# Patient Record
Sex: MALE | Race: WHITE | NOT HISPANIC OR LATINO | Employment: OTHER | URBAN - METROPOLITAN AREA
[De-identification: names, ages, dates, MRNs, and addresses within clinical notes are randomized per-mention and may not be internally consistent; named-entity substitution may affect disease eponyms.]

---

## 2017-01-06 ENCOUNTER — HOSPITAL ENCOUNTER (INPATIENT)
Facility: HOSPITAL | Age: 57
LOS: 6 days | Discharge: NURSING, REHAB OR LONG-TERM CARE | End: 2017-01-13
Attending: EMERGENCY MEDICINE | Admitting: FAMILY MEDICINE

## 2017-01-06 DIAGNOSIS — Z74.09 PROLONGED IMMOBILIZATION: ICD-10-CM

## 2017-01-06 DIAGNOSIS — A41.9 SEPSIS, DUE TO UNSPECIFIED ORGANISM: ICD-10-CM

## 2017-01-06 DIAGNOSIS — J10.1 TYPE A INFLUENZA: ICD-10-CM

## 2017-01-06 DIAGNOSIS — N39.0 UTI (URINARY TRACT INFECTION), BACTERIAL: Primary | ICD-10-CM

## 2017-01-06 DIAGNOSIS — A49.9 UTI (URINARY TRACT INFECTION), BACTERIAL: Primary | ICD-10-CM

## 2017-01-06 PROCEDURE — 99285 EMERGENCY DEPT VISIT HI MDM: CPT

## 2017-01-06 RX ORDER — WARFARIN SODIUM 4 MG/1
4 TABLET ORAL
COMMUNITY
End: 2017-01-13 | Stop reason: HOSPADM

## 2017-01-06 RX ORDER — SODIUM CHLORIDE 0.9 % (FLUSH) 0.9 %
10 SYRINGE (ML) INJECTION AS NEEDED
Status: DISCONTINUED | OUTPATIENT
Start: 2017-01-06 | End: 2017-01-13 | Stop reason: HOSPADM

## 2017-01-06 RX ORDER — PHENYTOIN 125 MG/5ML
300 SUSPENSION ORAL NIGHTLY
Status: ON HOLD | COMMUNITY
End: 2021-09-21

## 2017-01-06 NOTE — IP AVS SNAPSHOT
INTER-FACILITY TRANSFER   AFTER VISIT SUMMARY             Warnera S Kang            Summary of Your Hospitalization        About Your Hospitalization     You were admitted on:  January 7, 2017 You last received care in the:  [address]      Reason for Hospitalization     Your primary diagnosis was:  Not on File    Your diagnoses also included:  Uti (Urinary Tract Infection), Bacterial      Care Providers     Provider Service Role Specialty    Dylan Cuba MD Family Medicine Attending Provider Family Medicine    Dylan Cuba MD Family Medicine Consulting Physician  Family Medicine    Miguel Leonard MD Urology Consulting Physician  Urology    Jairo Crum MD -- Consulting Physician  Cardiology      Your Allergies  Date Reviewed: 1/7/2017    No active allergies       Medications    Based on the information you provided to us as well as any changes during this visit, the following is your updated medication list.  This is subject to change based on the care your receive at the receiving facility.  You should receive a new list once you are discharged.      If you have any questions or concerns, contact your primary care physician's office.             Your Medications      START taking these medications     amLODIPine 5 MG tablet   Take 1 tablet by mouth Daily.   Last time this was given:  1/13/2017  9:01 AM   Commonly known as:  NORVASC           cefepime   Infuse 100 mL into a venous catheter Every 12 (Twelve) Hours for 9 days.   Last time this was given:  1/13/2017 12:18 AM   Commonly known as:  MAXIPIME           metoprolol tartrate 25 MG tablet   Take 1 tablet by mouth Every 12 (Twelve) Hours.   Last time this was given:  1/13/2017  9:01 AM   Commonly known as:  LOPRESSOR           nitroglycerin 0.4 MG SL tablet   Place 1 tablet under the tongue Every 5 (Five) Minutes As Needed for chest pain (if systolic BP greater than 100 mm/Hg.).   Commonly known as:   NITROSTAT           saccharomyces boulardii 250 MG capsule   Take 1 capsule by mouth 2 (Two) Times a Day.   Last time this was given:  1/13/2017  9:00 AM   Commonly known as:  FLORASTOR             CHANGE how you take these medications     levETIRAcetam 500 MG tablet   Take 1 tablet by mouth Every 12 (Twelve) Hours.   Last time this was given:  1/13/2017  9:01 AM   Commonly known as:  KEPPRA   What changed:    - how to take this  - when to take this  - additional instructions           warfarin 3 MG tablet   4 mg by Enteral route daily. Give 1 tablet by mouth at bedtime related to unspecified atrial fibrillation PER FEEDING TUBE HOLDING FOR SURGERY   Last time this was given:  1/12/2017  6:07 PM   Commonly known as:  COUMADIN   What changed:  Another medication with the same name was removed. Continue taking this medication, and follow the directions you see here.             CONTINUE taking these medications     acetaminophen 325 MG tablet   650 mg by Enteral route every 6 (six) hours as needed (for pain/elevated temperature). GIVEN PER FEEDING TUBE   Last time this was given:  1/7/2017 12:31 AM   Commonly known as:  TYLENOL           bisacodyl 10 MG suppository   Insert 10 mg into the rectum daily as needed for constipation.   Commonly known as:  DULCOLAX           dextrose 40 % gel   Take 15 g by mouth as needed for low blood sugar (may give for BS<60 & symptomatic).   Commonly known as:  GLUTOSE           escitalopram 10 MG tablet   10 mg by Enteral route daily. GIVEN PER FEEDING TUBE   Last time this was given:  1/13/2017  9:01 AM   Commonly known as:  LEXAPRO           ferrous sulfate 300 (60 FE) MG/5ML syrup   325 mg by Enteral route daily. PER FEEDING TUBE   Last time this was given:  1/13/2017  9:02 AM           glucagon 1 MG injection   Inject 1 mg into the shoulder, thigh, or buttocks 1 (one) time as needed for low blood sugar (give for BS<60 & symptomatic, may repeat x2 with 10mins apart).   Commonly  known as:  GLUCAGEN           magnesium hydroxide 400 MG/5ML suspension   30 mL by Enteral route daily as needed for constipation. PER FEEDING TUBE   Commonly known as:  MILK OF MAGNESIA           metFORMIN 1000 MG tablet   1,000 mg by Enteral route 2 (two) times a day with meals. given at 0730 & 1630 PER FEEDING TUBE   Last time this was given:  1/13/2017  9:01 AM   Commonly known as:  GLUCOPHAGE           phenytoin 125 MG/5ML suspension   Take 300 mg by mouth Every Night.   Last time this was given:  1/13/2017  9:01 AM   Commonly known as:  DILANTIN           sennosides-docusate sodium 8.6-50 MG tablet   2 tablets by Enteral route every night. GIVEN PER FEEDING TUBE   Last time this was given:  1/10/2017  8:31 PM   Commonly known as:  SENOKOT-S           simvastatin 10 MG tablet   10 mg by Enteral route every night. GIVEN PER FEEDING TUBE   Commonly known as:  ZOCOR             STOP taking these medications     phenytoin 100 MG ER capsule   Commonly known as:  DILANTIN                Where to Get Your Medications      Information about where to get these medications is not yet available     ! Ask your nurse or doctor about these medications     amLODIPine 5 MG tablet    cefepime    levETIRAcetam 500 MG tablet    metoprolol tartrate 25 MG tablet    nitroglycerin 0.4 MG SL tablet    saccharomyces boulardii 250 MG capsule                Additional Instructions    Information is subject to change based on the care your receive at the receiving facility.  If you have any questions or concerns, contact your primary care physician's office.          Diet Instructions     Diet: Soft Texture, Consistent Carbohydrate; Thin Liquids, No Restrictions; Ground       Discharge Diet:   Soft Texture  Consistent Carbohydrate      Fluid Consistency:  Thin Liquids, No Restrictions   Soft Options:  Ground             Other Instructions     Resume Oxygen Therapy After Discharge       Device:  Nasal Cannula   Initial LPM:  2               Follow-ups for After Discharge        Follow-up Information     Follow up with [facility] .    Specialty:  Skilled Nursing Facility    Contact information:    [address] [place] [address] [phone]      Referrals and Follow-ups to Schedule     Ambulatory Referral to Physical Therapy Evaluate and treat    As directed    Specialty modality needed?:  Evaluate and treat             Scheduled Appointments     PCP follow up appt to be done by CCP - Patient discharging to rehab.            [employer] Signup Information     [employer] allows you to send messages to your doctor, view your test results, renew your prescriptions, schedule appointments, and more. To sign up, go to [employer] and click on the Sign Up Now link in the New User? box. Enter your [employer] Activation Code exactly as it appears below along with the last four digits of your Social Security Number and your Date of Birth () to complete the sign-up process. If you do not sign up before the expiration date, you must request a new code.    [employer] Activation Code: [email or web address]  Expires: 2017 11:09 AM    If you have questions, you can email [email or web address] or call [phone] to talk to our [employer] staff. Remember, [employer] is NOT to be used for urgent needs. For medical emergencies, dial 911.                     Patient Signature:  ____________________________________________________________    Date:  ____________________________________________________________

## 2017-01-07 ENCOUNTER — APPOINTMENT (OUTPATIENT)
Dept: GENERAL RADIOLOGY | Facility: HOSPITAL | Age: 57
End: 2017-01-07

## 2017-01-07 ENCOUNTER — APPOINTMENT (OUTPATIENT)
Dept: CT IMAGING | Facility: HOSPITAL | Age: 57
End: 2017-01-07

## 2017-01-07 PROBLEM — A49.9 UTI (URINARY TRACT INFECTION), BACTERIAL: Status: ACTIVE | Noted: 2017-01-07

## 2017-01-07 PROBLEM — N39.0 UTI (URINARY TRACT INFECTION), BACTERIAL: Status: ACTIVE | Noted: 2017-01-07

## 2017-01-07 LAB
ALBUMIN SERPL-MCNC: 4 G/DL (ref 3.5–5.2)
ALBUMIN/GLOB SERPL: 1.1 G/DL
ALP SERPL-CCNC: 98 U/L (ref 39–117)
ALT SERPL W P-5'-P-CCNC: 27 U/L (ref 1–41)
ANION GAP SERPL CALCULATED.3IONS-SCNC: 12.9 MMOL/L
ANION GAP SERPL CALCULATED.3IONS-SCNC: 19.8 MMOL/L
AST SERPL-CCNC: 16 U/L (ref 1–40)
B PERT DNA SPEC QL NAA+PROBE: NOT DETECTED
BACTERIA UR QL AUTO: ABNORMAL /HPF
BASOPHILS # BLD AUTO: 0.01 10*3/MM3 (ref 0–0.2)
BASOPHILS # BLD AUTO: 0.02 10*3/MM3 (ref 0–0.2)
BASOPHILS NFR BLD AUTO: 0.1 % (ref 0–1.5)
BASOPHILS NFR BLD AUTO: 0.1 % (ref 0–1.5)
BILIRUB SERPL-MCNC: 0.4 MG/DL (ref 0.1–1.2)
BILIRUB UR QL STRIP: NEGATIVE
BUN BLD-MCNC: 10 MG/DL (ref 6–20)
BUN BLD-MCNC: 11 MG/DL (ref 6–20)
BUN/CREAT SERPL: 13.3 (ref 7–25)
BUN/CREAT SERPL: 14.1 (ref 7–25)
C PNEUM DNA NPH QL NAA+NON-PROBE: NOT DETECTED
CALCIUM SPEC-SCNC: 8.2 MG/DL (ref 8.6–10.5)
CALCIUM SPEC-SCNC: 9.3 MG/DL (ref 8.6–10.5)
CHLORIDE SERPL-SCNC: 101 MMOL/L (ref 98–107)
CHLORIDE SERPL-SCNC: 105 MMOL/L (ref 98–107)
CHOLEST SERPL-MCNC: 133 MG/DL (ref 0–200)
CLARITY UR: ABNORMAL
CO2 SERPL-SCNC: 21.2 MMOL/L (ref 22–29)
CO2 SERPL-SCNC: 22.1 MMOL/L (ref 22–29)
COLOR UR: YELLOW
CREAT BLD-MCNC: 0.75 MG/DL (ref 0.76–1.27)
CREAT BLD-MCNC: 0.78 MG/DL (ref 0.76–1.27)
D-LACTATE SERPL-SCNC: 2.3 MMOL/L (ref 0.5–2)
D-LACTATE SERPL-SCNC: 2.6 MMOL/L (ref 0.5–2)
D-LACTATE SERPL-SCNC: 3.3 MMOL/L (ref 0.5–2)
DEPRECATED RDW RBC AUTO: 44.5 FL (ref 37–54)
DEPRECATED RDW RBC AUTO: 44.9 FL (ref 37–54)
EOSINOPHIL # BLD AUTO: 0.01 10*3/MM3 (ref 0–0.7)
EOSINOPHIL # BLD AUTO: 0.05 10*3/MM3 (ref 0–0.7)
EOSINOPHIL NFR BLD AUTO: 0 % (ref 0.3–6.2)
EOSINOPHIL NFR BLD AUTO: 0.4 % (ref 0.3–6.2)
ERYTHROCYTE [DISTWIDTH] IN BLOOD BY AUTOMATED COUNT: 14.6 % (ref 11.5–14.5)
ERYTHROCYTE [DISTWIDTH] IN BLOOD BY AUTOMATED COUNT: 14.7 % (ref 11.5–14.5)
FLUAV AG NPH QL: POSITIVE
FLUAV H1 2009 PAND RNA NPH QL NAA+PROBE: NOT DETECTED
FLUAV H1 HA GENE NPH QL NAA+PROBE: NOT DETECTED
FLUAV H3 RNA NPH QL NAA+PROBE: NOT DETECTED
FLUAV SUBTYP SPEC NAA+PROBE: NOT DETECTED
FLUBV AG NPH QL IA: NEGATIVE
FLUBV RNA ISLT QL NAA+PROBE: NOT DETECTED
GFR SERPL CREATININE-BSD FRML MDRD: 103 ML/MIN/1.73
GFR SERPL CREATININE-BSD FRML MDRD: 108 ML/MIN/1.73
GFR SERPL CREATININE-BSD FRML MDRD: 125 ML/MIN/1.73
GFR SERPL CREATININE-BSD FRML MDRD: 131 ML/MIN/1.73
GLOBULIN UR ELPH-MCNC: 3.6 GM/DL
GLUCOSE BLD-MCNC: 127 MG/DL (ref 65–99)
GLUCOSE BLD-MCNC: 157 MG/DL (ref 65–99)
GLUCOSE UR STRIP-MCNC: NEGATIVE MG/DL
HADV DNA SPEC NAA+PROBE: NOT DETECTED
HCOV 229E RNA SPEC QL NAA+PROBE: NOT DETECTED
HCOV HKU1 RNA SPEC QL NAA+PROBE: NOT DETECTED
HCOV NL63 RNA SPEC QL NAA+PROBE: NOT DETECTED
HCOV OC43 RNA SPEC QL NAA+PROBE: NOT DETECTED
HCT VFR BLD AUTO: 39.7 % (ref 40.4–52.2)
HCT VFR BLD AUTO: 47.4 % (ref 40.4–52.2)
HDLC SERPL-MCNC: 51 MG/DL (ref 40–60)
HGB BLD-MCNC: 12.7 G/DL (ref 13.7–17.6)
HGB BLD-MCNC: 14.9 G/DL (ref 13.7–17.6)
HGB UR QL STRIP.AUTO: ABNORMAL
HMPV RNA NPH QL NAA+NON-PROBE: NOT DETECTED
HOLD SPECIMEN: NORMAL
HOLD SPECIMEN: NORMAL
HPIV1 RNA SPEC QL NAA+PROBE: NOT DETECTED
HPIV2 RNA SPEC QL NAA+PROBE: NOT DETECTED
HPIV3 RNA NPH QL NAA+PROBE: NOT DETECTED
HPIV4 P GENE NPH QL NAA+PROBE: NOT DETECTED
HYALINE CASTS UR QL AUTO: ABNORMAL /LPF
IMM GRANULOCYTES # BLD: 0.03 10*3/MM3 (ref 0–0.03)
IMM GRANULOCYTES # BLD: 0.12 10*3/MM3 (ref 0–0.03)
IMM GRANULOCYTES NFR BLD: 0.2 % (ref 0–0.5)
IMM GRANULOCYTES NFR BLD: 0.5 % (ref 0–0.5)
INR PPP: 2.26 (ref 0.9–1.1)
INR PPP: 2.46 (ref 0.9–1.1)
KETONES UR QL STRIP: NEGATIVE
LDLC SERPL CALC-MCNC: 56 MG/DL (ref 0–100)
LDLC/HDLC SERPL: 1.1 {RATIO}
LEUKOCYTE ESTERASE UR QL STRIP.AUTO: ABNORMAL
LYMPHOCYTES # BLD AUTO: 0.91 10*3/MM3 (ref 0.9–4.8)
LYMPHOCYTES # BLD AUTO: 1.89 10*3/MM3 (ref 0.9–4.8)
LYMPHOCYTES NFR BLD AUTO: 7.2 % (ref 19.6–45.3)
LYMPHOCYTES NFR BLD AUTO: 8.3 % (ref 19.6–45.3)
M PNEUMO IGG SER IA-ACNC: NOT DETECTED
MAGNESIUM SERPL-MCNC: 1.2 MG/DL (ref 1.6–2.6)
MAGNESIUM SERPL-MCNC: 1.7 MG/DL (ref 1.6–2.6)
MCH RBC QN AUTO: 26.6 PG (ref 27–32.7)
MCH RBC QN AUTO: 26.6 PG (ref 27–32.7)
MCHC RBC AUTO-ENTMCNC: 31.4 G/DL (ref 32.6–36.4)
MCHC RBC AUTO-ENTMCNC: 32 G/DL (ref 32.6–36.4)
MCV RBC AUTO: 83.2 FL (ref 79.8–96.2)
MCV RBC AUTO: 84.6 FL (ref 79.8–96.2)
MONOCYTES # BLD AUTO: 0.3 10*3/MM3 (ref 0.2–1.2)
MONOCYTES # BLD AUTO: 1.59 10*3/MM3 (ref 0.2–1.2)
MONOCYTES NFR BLD AUTO: 2.4 % (ref 5–12)
MONOCYTES NFR BLD AUTO: 7 % (ref 5–12)
NEUTROPHILS # BLD AUTO: 11.35 10*3/MM3 (ref 1.9–8.1)
NEUTROPHILS # BLD AUTO: 19.1 10*3/MM3 (ref 1.9–8.1)
NEUTROPHILS NFR BLD AUTO: 84.1 % (ref 42.7–76)
NEUTROPHILS NFR BLD AUTO: 89.7 % (ref 42.7–76)
NITRITE UR QL STRIP: NEGATIVE
NT-PROBNP SERPL-MCNC: 1066 PG/ML (ref 0–900)
PH UR STRIP.AUTO: 5.5 [PH] (ref 5–8)
PHENYTOIN SERPL-MCNC: 7.2 MCG/ML (ref 10–20)
PHOSPHATE SERPL-MCNC: 2.2 MG/DL (ref 2.5–4.5)
PLATELET # BLD AUTO: 196 10*3/MM3 (ref 140–500)
PLATELET # BLD AUTO: 211 10*3/MM3 (ref 140–500)
PMV BLD AUTO: 10.2 FL (ref 6–12)
PMV BLD AUTO: 10.3 FL (ref 6–12)
POTASSIUM BLD-SCNC: 3.7 MMOL/L (ref 3.5–5.2)
POTASSIUM BLD-SCNC: 3.9 MMOL/L (ref 3.5–5.2)
PROCALCITONIN SERPL-MCNC: 0.47 NG/ML (ref 0.1–0.25)
PROCALCITONIN SERPL-MCNC: 10.09 NG/ML (ref 0.1–0.25)
PROT SERPL-MCNC: 7.6 G/DL (ref 6–8.5)
PROT UR QL STRIP: ABNORMAL
PROTHROMBIN TIME: 24.2 SECONDS (ref 11.7–14.2)
PROTHROMBIN TIME: 25.8 SECONDS (ref 11.7–14.2)
RBC # BLD AUTO: 4.77 10*6/MM3 (ref 4.6–6)
RBC # BLD AUTO: 5.6 10*6/MM3 (ref 4.6–6)
RBC # UR: ABNORMAL /HPF
REF LAB TEST METHOD: ABNORMAL
RHINOVIRUS RNA SPEC NAA+PROBE: NOT DETECTED
RSV RNA NPH QL NAA+NON-PROBE: NOT DETECTED
SODIUM BLD-SCNC: 140 MMOL/L (ref 136–145)
SODIUM BLD-SCNC: 142 MMOL/L (ref 136–145)
SP GR UR STRIP: 1.01 (ref 1–1.03)
SQUAMOUS #/AREA URNS HPF: ABNORMAL /HPF
TRIGL SERPL-MCNC: 129 MG/DL (ref 0–150)
TSH SERPL DL<=0.05 MIU/L-ACNC: 1.13 MIU/ML (ref 0.27–4.2)
UROBILINOGEN UR QL STRIP: ABNORMAL
VLDLC SERPL-MCNC: 25.8 MG/DL (ref 5–40)
WBC NRBC COR # BLD: 12.65 10*3/MM3 (ref 4.5–10.7)
WBC NRBC COR # BLD: 22.73 10*3/MM3 (ref 4.5–10.7)
WBC UR QL AUTO: ABNORMAL /HPF
WHOLE BLOOD HOLD SPECIMEN: NORMAL
WHOLE BLOOD HOLD SPECIMEN: NORMAL

## 2017-01-07 PROCEDURE — 70450 CT HEAD/BRAIN W/O DYE: CPT

## 2017-01-07 PROCEDURE — 71010 HC CHEST PA OR AP: CPT

## 2017-01-07 PROCEDURE — 80061 LIPID PANEL: CPT | Performed by: INTERNAL MEDICINE

## 2017-01-07 PROCEDURE — 81001 URINALYSIS AUTO W/SCOPE: CPT | Performed by: EMERGENCY MEDICINE

## 2017-01-07 PROCEDURE — 87186 SC STD MICRODIL/AGAR DIL: CPT | Performed by: EMERGENCY MEDICINE

## 2017-01-07 PROCEDURE — 83605 ASSAY OF LACTIC ACID: CPT | Performed by: EMERGENCY MEDICINE

## 2017-01-07 PROCEDURE — 99223 1ST HOSP IP/OBS HIGH 75: CPT | Performed by: INTERNAL MEDICINE

## 2017-01-07 PROCEDURE — 25010000002 PIPERACILLIN SOD-TAZOBACTAM PER 1 G: Performed by: PHYSICIAN ASSISTANT

## 2017-01-07 PROCEDURE — 83605 ASSAY OF LACTIC ACID: CPT | Performed by: INTERNAL MEDICINE

## 2017-01-07 PROCEDURE — 85025 COMPLETE CBC W/AUTO DIFF WBC: CPT | Performed by: INTERNAL MEDICINE

## 2017-01-07 PROCEDURE — 87633 RESP VIRUS 12-25 TARGETS: CPT | Performed by: INTERNAL MEDICINE

## 2017-01-07 PROCEDURE — 87040 BLOOD CULTURE FOR BACTERIA: CPT | Performed by: EMERGENCY MEDICINE

## 2017-01-07 PROCEDURE — 87798 DETECT AGENT NOS DNA AMP: CPT | Performed by: INTERNAL MEDICINE

## 2017-01-07 PROCEDURE — 85025 COMPLETE CBC W/AUTO DIFF WBC: CPT | Performed by: EMERGENCY MEDICINE

## 2017-01-07 PROCEDURE — 83880 ASSAY OF NATRIURETIC PEPTIDE: CPT | Performed by: INTERNAL MEDICINE

## 2017-01-07 PROCEDURE — 99222 1ST HOSP IP/OBS MODERATE 55: CPT | Performed by: INTERNAL MEDICINE

## 2017-01-07 PROCEDURE — 83735 ASSAY OF MAGNESIUM: CPT | Performed by: INTERNAL MEDICINE

## 2017-01-07 PROCEDURE — 25010000002 PIPERACILLIN SOD-TAZOBACTAM PER 1 G: Performed by: INTERNAL MEDICINE

## 2017-01-07 PROCEDURE — 87147 CULTURE TYPE IMMUNOLOGIC: CPT | Performed by: EMERGENCY MEDICINE

## 2017-01-07 PROCEDURE — 25010000002 MAGNESIUM SULFATE IN D5W 1G/100ML (PREMIX) 10-5 MG/ML-% SOLUTION: Performed by: INTERNAL MEDICINE

## 2017-01-07 PROCEDURE — 80185 ASSAY OF PHENYTOIN TOTAL: CPT | Performed by: EMERGENCY MEDICINE

## 2017-01-07 PROCEDURE — 87086 URINE CULTURE/COLONY COUNT: CPT | Performed by: EMERGENCY MEDICINE

## 2017-01-07 PROCEDURE — 87581 M.PNEUMON DNA AMP PROBE: CPT | Performed by: INTERNAL MEDICINE

## 2017-01-07 PROCEDURE — 84443 ASSAY THYROID STIM HORMONE: CPT | Performed by: INTERNAL MEDICINE

## 2017-01-07 PROCEDURE — 80177 DRUG SCRN QUAN LEVETIRACETAM: CPT | Performed by: EMERGENCY MEDICINE

## 2017-01-07 PROCEDURE — 80053 COMPREHEN METABOLIC PANEL: CPT | Performed by: EMERGENCY MEDICINE

## 2017-01-07 PROCEDURE — 87804 INFLUENZA ASSAY W/OPTIC: CPT | Performed by: PHYSICIAN ASSISTANT

## 2017-01-07 PROCEDURE — 85610 PROTHROMBIN TIME: CPT | Performed by: EMERGENCY MEDICINE

## 2017-01-07 PROCEDURE — 85610 PROTHROMBIN TIME: CPT | Performed by: INTERNAL MEDICINE

## 2017-01-07 PROCEDURE — 87486 CHLMYD PNEUM DNA AMP PROBE: CPT | Performed by: INTERNAL MEDICINE

## 2017-01-07 PROCEDURE — 84100 ASSAY OF PHOSPHORUS: CPT | Performed by: INTERNAL MEDICINE

## 2017-01-07 PROCEDURE — 84145 PROCALCITONIN (PCT): CPT | Performed by: EMERGENCY MEDICINE

## 2017-01-07 PROCEDURE — 87150 DNA/RNA AMPLIFIED PROBE: CPT | Performed by: EMERGENCY MEDICINE

## 2017-01-07 PROCEDURE — 84145 PROCALCITONIN (PCT): CPT | Performed by: INTERNAL MEDICINE

## 2017-01-07 RX ORDER — ACETAMINOPHEN 500 MG
1000 TABLET ORAL ONCE
Status: COMPLETED | OUTPATIENT
Start: 2017-01-07 | End: 2017-01-07

## 2017-01-07 RX ORDER — LEVETIRACETAM 5 MG/ML
500 INJECTION INTRAVASCULAR EVERY 12 HOURS SCHEDULED
Status: DISCONTINUED | OUTPATIENT
Start: 2017-01-07 | End: 2017-01-11 | Stop reason: CLARIF

## 2017-01-07 RX ORDER — LEVETIRACETAM 500 MG/1
500 TABLET ORAL 2 TIMES DAILY
Status: DISCONTINUED | OUTPATIENT
Start: 2017-01-07 | End: 2017-01-07 | Stop reason: ALTCHOICE

## 2017-01-07 RX ORDER — SODIUM CHLORIDE 0.9 % (FLUSH) 0.9 %
1-10 SYRINGE (ML) INJECTION AS NEEDED
Status: DISCONTINUED | OUTPATIENT
Start: 2017-01-07 | End: 2017-01-13 | Stop reason: HOSPADM

## 2017-01-07 RX ORDER — NITROGLYCERIN 0.4 MG/1
0.4 TABLET SUBLINGUAL
Status: DISCONTINUED | OUTPATIENT
Start: 2017-01-07 | End: 2017-01-13 | Stop reason: HOSPADM

## 2017-01-07 RX ORDER — ACETAMINOPHEN 325 MG/1
650 TABLET ORAL EVERY 4 HOURS PRN
Status: DISCONTINUED | OUTPATIENT
Start: 2017-01-07 | End: 2017-01-13 | Stop reason: HOSPADM

## 2017-01-07 RX ORDER — ESCITALOPRAM OXALATE 10 MG/1
10 TABLET ORAL DAILY
Status: DISCONTINUED | OUTPATIENT
Start: 2017-01-07 | End: 2017-01-13 | Stop reason: HOSPADM

## 2017-01-07 RX ORDER — ACETAMINOPHEN 325 MG/1
650 TABLET ORAL EVERY 6 HOURS PRN
Status: DISCONTINUED | OUTPATIENT
Start: 2017-01-07 | End: 2017-01-13 | Stop reason: HOSPADM

## 2017-01-07 RX ORDER — PHENYTOIN 125 MG/5ML
300 SUSPENSION ORAL NIGHTLY
Status: DISCONTINUED | OUTPATIENT
Start: 2017-01-07 | End: 2017-01-07 | Stop reason: DRUGHIGH

## 2017-01-07 RX ORDER — PROMETHAZINE HYDROCHLORIDE 25 MG/1
12.5 TABLET ORAL EVERY 6 HOURS PRN
Status: DISCONTINUED | OUTPATIENT
Start: 2017-01-07 | End: 2017-01-13 | Stop reason: HOSPADM

## 2017-01-07 RX ORDER — NICOTINE POLACRILEX 4 MG
15 LOZENGE BUCCAL AS NEEDED
Status: DISCONTINUED | OUTPATIENT
Start: 2017-01-07 | End: 2017-01-13 | Stop reason: HOSPADM

## 2017-01-07 RX ORDER — BISACODYL 10 MG
10 SUPPOSITORY, RECTAL RECTAL DAILY PRN
Status: DISCONTINUED | OUTPATIENT
Start: 2017-01-07 | End: 2017-01-13 | Stop reason: HOSPADM

## 2017-01-07 RX ORDER — MORPHINE SULFATE 2 MG/ML
1 INJECTION, SOLUTION INTRAMUSCULAR; INTRAVENOUS EVERY 4 HOURS PRN
Status: DISCONTINUED | OUTPATIENT
Start: 2017-01-07 | End: 2017-01-13 | Stop reason: HOSPADM

## 2017-01-07 RX ORDER — SODIUM CHLORIDE 450 MG/100ML
100 INJECTION, SOLUTION INTRAVENOUS CONTINUOUS
Status: DISCONTINUED | OUTPATIENT
Start: 2017-01-07 | End: 2017-01-11

## 2017-01-07 RX ORDER — WARFARIN SODIUM 3 MG/1
3 TABLET ORAL
Status: DISCONTINUED | OUTPATIENT
Start: 2017-01-07 | End: 2017-01-13 | Stop reason: HOSPADM

## 2017-01-07 RX ORDER — NALOXONE HCL 0.4 MG/ML
0.4 VIAL (ML) INJECTION
Status: DISCONTINUED | OUTPATIENT
Start: 2017-01-07 | End: 2017-01-13 | Stop reason: HOSPADM

## 2017-01-07 RX ORDER — SENNA AND DOCUSATE SODIUM 50; 8.6 MG/1; MG/1
2 TABLET, FILM COATED ORAL NIGHTLY
Status: DISCONTINUED | OUTPATIENT
Start: 2017-01-07 | End: 2017-01-13 | Stop reason: HOSPADM

## 2017-01-07 RX ORDER — OSELTAMIVIR PHOSPHATE 75 MG/1
75 CAPSULE ORAL EVERY 12 HOURS SCHEDULED
Status: DISCONTINUED | OUTPATIENT
Start: 2017-01-07 | End: 2017-01-11 | Stop reason: SDUPTHER

## 2017-01-07 RX ORDER — PHENYTOIN SODIUM 100 MG/1
200 CAPSULE, EXTENDED RELEASE ORAL DAILY
Status: DISCONTINUED | OUTPATIENT
Start: 2017-01-07 | End: 2017-01-07 | Stop reason: DRUGHIGH

## 2017-01-07 RX ORDER — FERROUS SULFATE 300 MG/5ML
325 LIQUID (ML) ORAL DAILY
Status: DISCONTINUED | OUTPATIENT
Start: 2017-01-07 | End: 2017-01-13 | Stop reason: HOSPADM

## 2017-01-07 RX ORDER — DOCUSATE SODIUM 100 MG/1
100 CAPSULE, LIQUID FILLED ORAL 2 TIMES DAILY
Status: DISCONTINUED | OUTPATIENT
Start: 2017-01-07 | End: 2017-01-13 | Stop reason: HOSPADM

## 2017-01-07 RX ADMIN — MAGNESIUM SULFATE HEPTAHYDRATE 1 G: 1 INJECTION, SOLUTION INTRAVENOUS at 10:18

## 2017-01-07 RX ADMIN — TAZOBACTAM SODIUM AND PIPERACILLIN SODIUM 4.5 G: 500; 4 INJECTION, SOLUTION INTRAVENOUS at 09:32

## 2017-01-07 RX ADMIN — SODIUM CHLORIDE 2355 ML: 9 INJECTION, SOLUTION INTRAVENOUS at 00:49

## 2017-01-07 RX ADMIN — SODIUM CHLORIDE 100 ML/HR: 4.5 INJECTION, SOLUTION INTRAVENOUS at 06:57

## 2017-01-07 RX ADMIN — SODIUM CHLORIDE 100 ML/HR: 4.5 INJECTION, SOLUTION INTRAVENOUS at 21:13

## 2017-01-07 RX ADMIN — ACETAMINOPHEN 500 MG TABLET 1000 MG: at 00:31

## 2017-01-07 RX ADMIN — TAZOBACTAM SODIUM AND PIPERACILLIN SODIUM 4.5 G: 500; 4 INJECTION, SOLUTION INTRAVENOUS at 01:48

## 2017-01-07 RX ADMIN — MAGNESIUM SULFATE HEPTAHYDRATE 1 G: 1 INJECTION, SOLUTION INTRAVENOUS at 11:27

## 2017-01-07 NOTE — CONSULTS
Referring Provider: Bogdan Palomares MD  [address] [place] [id number]    Reason for Consultation: sepsis    History of present illness:  Servando Kapadia is a 56 YOM who I am asked to evaluate and give opinion for sepsis. History obtained from CONI Richards as the patient cannot answer any questions (unsure if baseline) and the medical record which I summarize/synthesize as follows: He was brought to the ER from his nursing home out of concern for seizure-like activity which is a known problem for him. In the ER he was febrile to 102.6 with tachycardia and leukocytosis. His UA showed TNTC WBCs so he was admitted for UTI and sepsis. His Flu Ag is positive for Flu A. He is currently on Zosyn. He cannot give further history due to mental status.    ROS: cannot be obtained due to mental status    Past Medical History   Diagnosis Date   • Anemia    • Atrial fibrillation    • Atrial fibrillation    • Benign prostatic hyperplasia with lower urinary tract symptoms    • Cerebral infarct    • Chronic obstructive pyelonephritis    • Constipation    • Depression    • Diabetes mellitus      type 2   • Enlarged prostate    • Hemiplegia and hemiparesis    • Hyperlipidemia    • Hypertension    • Kidney stone    • Seizures    • Sleep apnea    • Stroke      apparent right side residual weakness   • Urinary incontinence        Past Surgical History   Procedure Laterality Date   • Cystoscopy w/ ureteral stent placement N/A 5/3/2016     Procedure: CYSTOSCOPY LASER LITHOTRIPSY LEFT RETROGRADE URETERAL CATHETER AND STENT PLACEMENT;  Surgeon: Eduin Brennan MD;  Location: [facility] OR;  Service:    • Endoscopy w/ peg tube placement N/A 5/13/2016     Procedure: ESOPHAGOGASTRODUODENOSCOPY WITH PERCUTANEOUS ENDOSCOPIC GASTROSTOMY TUBE INSERTION;  Surgeon: Lion Weber MD;  Location: [facility] ENDOSCOPY;  Service:    • Ureteroscopy laser lithotripsy with stent insertion N/A 6/8/2016     Procedure: URETEROSCOPY LASER LITHOTRIPSY STONE  EXTRACTION WITH JJ STENT INSERTION;  Surgeon: Eduin Brennan MD;  Location: [facility]  Service:        Social History:  Lives at nursing home  cannot be obtained due to mental status    Family History:  cannot be obtained due to mental status    Allergies:  NKDA per chart    Medications:    Current Facility-Administered Medications:   •  acetaminophen (TYLENOL) tablet 650 mg, 650 mg, Oral, Q6H PRN, Bogdan Palomares MD  •  acetaminophen (TYLENOL) tablet 650 mg, 650 mg, Oral, Q4H PRN, Bogdan Palomares MD  •  bisacodyl (DULCOLAX) suppository 10 mg, 10 mg, Rectal, Daily PRN, Bogdan Palomares MD  •  dextrose (GLUTOSE) oral gel 15 g, 15 g, Oral, PRN, Bogdan Palomares MD  •  docusate sodium (COLACE) capsule 100 mg, 100 mg, Oral, BID, Bogdan Palomares MD, 100 mg at 01/07/17 1025  •  escitalopram (LEXAPRO) tablet 10 mg, 10 mg, Oral, Daily, Bogdan Palomares MD, 10 mg at 01/07/17 1025  •  ferrous sulfate 300 (60 FE) MG/5ML syrup 325 mg, 325 mg, Oral, Daily, Bogdan Palomares MD, 325 mg at 01/07/17 1026  •  levETIRAcetam (KEPPRA) tablet 500 mg, 500 mg, Oral, BID, Bogdan Palomares MD, 500 mg at 01/07/17 1026  •  magnesium hydroxide (MILK OF MAGNESIA) 400 MG/5ML suspension 30 mL, 30 mL, Oral, Daily PRN, Bogdan Palomares MD  •  magnesium sulfate in D5W 1g/100mL (PREMIX) IVPB 1 g, 1 g, Intravenous, Q1H, Bogdan Palomares MD, 1 g at 01/07/17 1018  •  Morphine sulfate (PF) injection 1 mg, 1 mg, Intravenous, Q4H PRN **AND** naloxone (NARCAN) injection 0.4 mg, 0.4 mg, Intravenous, Q5 Min PRN, Bogdan Palomares MD  •  nitroglycerin (NITROSTAT) SL tablet 0.4 mg, 0.4 mg, Sublingual, Q5 Min PRN, Bogdan Palomares MD  •  oseltamivir (TAMIFLU) capsule 75 mg, 75 mg, Oral, Q12H, Bogdan Palomares MD, 75 mg at 01/07/17 1026  •  phenytoin (DILANTIN) 125 MG/5ML suspension 300 mg, 300 mg, Oral, Nightly, Bogdan Palomares MD  •  phenytoin (DILANTIN) ER capsule 200 mg, 200 mg, Oral, Daily, Bogdan Palomares MD, 200 mg at 01/07/17 1026  •   piperacillin-tazobactam (ZOSYN) 4.5 g in dextrose 100 mL IVPB (premix), 4.5 g, Intravenous, Q8H, Bogdan Palomares MD, 4.5 g at 01/07/17 0932  •  promethazine (PHENERGAN) tablet 12.5 mg, 12.5 mg, Oral, Q6H PRN, Bogdan Palomares MD  •  sennosides-docusate sodium (SENOKOT-S) 8.6-50 MG tablet 2 tablet, 2 tablet, Oral, Nightly, Bogdan Palomares MD  •  sodium chloride 0.45 % infusion, 100 mL/hr, Intravenous, Continuous, Bogdan Palomares MD, Last Rate: 100 mL/hr at 01/07/17 0657, 100 mL/hr at 01/07/17 0657  •  sodium chloride 0.9 % flush 1-10 mL, 1-10 mL, Intravenous, PRN, Bogdan Palomares MD  •  sodium chloride 0.9 % flush 10 mL, 10 mL, Intravenous, PRN, Fran Schrader MD  •  warfarin (COUMADIN) tablet 3 mg, 3 mg, Oral, Daily, Bogdan Palomares MD      Objective   Vital Signs   Temp:  [96.9 °F (36.1 °C)-102.6 °F (39.2 °C)] 98.3 °F (36.8 °C)  Heart Rate:  [] 95  Resp:  [18-20] 20  BP: (109-174)/() 109/79    Physical Exam:   General: opens eyes to voice but doesn't answer questions or follow commands (unknown baseline)  Head: round facies  Eyes: PERRL, EOMI, no scleral icterus  ENT: MM dry, OP clear, no thrush. Poor dentition.   Neck: Supple, trachea is midline  Cardiovascular: NR, RR, no murmurs, rubs; no LE edema  Respiratory: Lungs are clear to ascultation bilaterally, no rales or wheezing; normal work of breathing on ambient air   GI: Abdomen is obese, soft, non-tender, non-distended, normal bowel sounds in all four quadrants; no hepatosplenomegaly, no masses palpated  : no Beyer catheter present  Musculoskeletal: no joint abnormalities, normal musculature  Skin: No rashes, lesions, or embolic phenomenon  Neurological: Alert and oriented x 0  Psychiatric: A&O x 0  Lymph: no pre-auricular, post-auricular, submandibular, cervical, supraclavicular LAD  Vasc: no cyanosis; PIV w/o erythema    Labs:     Lab Results   Component Value Date    WBC 22.73 (H) 01/07/2017    HGB 12.7 (L) 01/07/2017    HCT 39.7 (L)  01/07/2017    MCV 83.2 01/07/2017     01/07/2017       Lab Results   Component Value Date    GLUCOSE 157 (H) 01/07/2017    BUN 10 01/07/2017    CREATININE 0.75 (L) 01/07/2017    EGFRIFNONA 108 01/07/2017    EGFRIFAFRI 131 01/07/2017    BCR 13.3 01/07/2017    CO2 22.1 01/07/2017    CALCIUM 8.2 (L) 01/07/2017    ALBUMIN 4.00 01/07/2017    LABIL2 1.1 01/07/2017    AST 16 01/07/2017    ALT 27 01/07/2017     Flu A Ag +  Procal 10  UA TNTC WBCs and RBCs    Microbiology:  1/7 BCx: NGTD  1/7 UCx: pending    Radiology (personally reviewed):  CT head negative  CXR negative    Assessment/Plan   1. Sepsis due to influenza A  -I do not know exactly when his symptoms began due to his limited history so I am going to start tamiflu 75 mg PO BID x 5 days  -there is a known associated between H1N1 and elevated Procal so I am going to check an RVP to confirm it is H1N1  -blood cultures negative and I think we have a viral diagnosis so I am stopping Zosyn; will f/u blood cultures  -repeat CXR to make sure no evolution since admission    2. TME - I do not know his baseline. Will await his PCP's evaluation.    3. Pyuria - f/u urology evaluation    Thank you for this consult. ID will follow. I discussed the patients findings and my recommendations with CONI Richards.

## 2017-01-07 NOTE — PLAN OF CARE
Problem: Patient Care Overview (Adult)  Goal: Plan of Care Review  Outcome: Ongoing (interventions implemented as appropriate)    01/07/17 0837   Coping/Psychosocial Response Interventions   Plan Of Care Reviewed With patient   Patient Care Overview   Progress no change   Outcome Evaluation   Outcome Summary/Follow up Plan pt up to floor at 0500, very drowsy and lethargic. arousable to verbal stimuli. oriented x3. continue to monitor labs and vitals. sepsis protoclol. fluid therapy. pt resting comfortably       Goal: Adult Individualization and Mutuality  Outcome: Ongoing (interventions implemented as appropriate)  Goal: Discharge Needs Assessment  Outcome: Ongoing (interventions implemented as appropriate)    01/07/17 0837   Discharge Needs Assessment   Concerns To Be Addressed basic needs concerns   Discharge Disposition still a patient         Problem: Sepsis (Adult)  Goal: Signs and Symptoms of Listed Potential Problems Will be Absent or Manageable (Sepsis)  Outcome: Ongoing (interventions implemented as appropriate)    01/07/17 0837   Sepsis   Problems Assessed (Sepsis) all   Problems Present (Sepsis) progression of infection         Problem: Fall Risk (Adult)  Goal: Identify Related Risk Factors and Signs and Symptoms  Outcome: Ongoing (interventions implemented as appropriate)    01/07/17 0837   Fall Risk   Fall Risk: Related Risk Factors age-related changes;depression/anxiety;fatigue/slow reaction;gait/mobility problems   Fall Risk: Signs and Symptoms presence of risk factors       Goal: Absence of Falls  Outcome: Ongoing (interventions implemented as appropriate)    01/07/17 0837   Fall Risk (Adult)   Absence of Falls making progress toward outcome

## 2017-01-07 NOTE — ED PROVIDER NOTES
EMERGENCY DEPARTMENT ENCOUNTER    CHIEF COMPLAINT  Chief Complaint: seizures  History given by: EMS, nursing home  History limited by: nothing  Room Number: 08/08  PMD: Dylan Cuba MD      HPI:  Pt is a 56 y.o. male who presents from the nursing home complaining of seizure like activity. Pt denies head injury or incontinence during the seizure. Hx of seizures. Pt states he had a stroke 14 years ago and has had seizures since. Pt denies any pain at this time.        Timing: episodic  Intensity/Severity: moderate  Progression: resolved  Associated Symptoms: none  Aggravating Factors: unknown  Alleviating Factors: unknown  Previous Episodes: yes  Treatment before arrival: none    PAST MEDICAL HISTORY  Active Ambulatory Problems     Diagnosis Date Noted   • Somnolence 04/29/2016   • Sepsis 04/30/2016   • Metabolic encephalopathy 04/30/2016   • Oropharyngeal dysphagia 05/14/2016   • Gastrojejunostomy tube status 05/14/2016     Resolved Ambulatory Problems     Diagnosis Date Noted   • No Resolved Ambulatory Problems     Past Medical History   Diagnosis Date   • Anemia    • Atrial fibrillation    • Atrial fibrillation    • Benign prostatic hyperplasia with lower urinary tract symptoms    • Cerebral infarct    • Chronic obstructive pyelonephritis    • Constipation    • Depression    • Diabetes mellitus    • Enlarged prostate    • Hemiplegia and hemiparesis    • Hyperlipidemia    • Hypertension    • Kidney stone    • Seizures    • Sleep apnea    • Stroke    • Urinary incontinence        PAST SURGICAL HISTORY  Past Surgical History   Procedure Laterality Date   • Cystoscopy w/ ureteral stent placement N/A 5/3/2016     Procedure: CYSTOSCOPY LASER LITHOTRIPSY LEFT RETROGRADE URETERAL CATHETER AND STENT PLACEMENT;  Surgeon: Eduin Brennan MD;  Location: [facility];  Service:    • Endoscopy w/ peg tube placement N/A 5/13/2016     Procedure: ESOPHAGOGASTRODUODENOSCOPY WITH PERCUTANEOUS ENDOSCOPIC GASTROSTOMY TUBE  INSERTION;  Surgeon: Lion Weber MD;  Location: [facility] ENDOSCOPY;  Service:    • Ureteroscopy laser lithotripsy with stent insertion N/A 6/8/2016     Procedure: URETEROSCOPY LASER LITHOTRIPSY STONE EXTRACTION WITH JJ STENT INSERTION;  Surgeon: Eduin Brennan MD;  Location: [facility] MAIN OR;  Service:        FAMILY HISTORY  History reviewed. No pertinent family history.    SOCIAL HISTORY  Social History     Social History   • Marital status:      Spouse name: N/A   • Number of children: N/A   • Years of education: N/A     Occupational History   • Not on file.     Social History Main Topics   • Smoking status: Never Smoker   • Smokeless tobacco: Never Used      Comment: pt unable to answer questions   • Alcohol use No   • Drug use: No   • Sexual activity: No     Other Topics Concern   • Not on file     Social History Narrative       ALLERGIES  Review of patient's allergies indicates no known allergies.    REVIEW OF SYSTEMS  Review of Systems   Constitutional: Positive for fever. Negative for chills and fatigue.   HENT: Negative for congestion, rhinorrhea and sore throat.    Eyes: Negative for pain.   Respiratory: Negative for cough, chest tightness, shortness of breath and wheezing.    Cardiovascular: Negative for chest pain, palpitations and leg swelling.   Gastrointestinal: Negative for abdominal pain, diarrhea, nausea and vomiting.   Genitourinary: Negative for difficulty urinating, dysuria, flank pain and frequency.   Musculoskeletal: Negative for arthralgias, myalgias, neck pain and neck stiffness.   Skin: Negative for rash.   Neurological: Positive for seizures. Negative for dizziness, speech difficulty, weakness, light-headedness, numbness and headaches.   Psychiatric/Behavioral: Negative.    All other systems reviewed and are negative.      PHYSICAL EXAM  ED Triage Vitals   Temp Heart Rate Resp BP SpO2   01/06/17 2347 01/06/17 2346 01/06/17 2346 01/06/17 2346 01/06/17 2346   102.6 °F (39.2 °C)  140 18 174/113 98 %      Temp src Heart Rate Source Patient Position BP Location FiO2 (%)   01/06/17 2347 -- -- -- --   Tympanic           Physical Exam   Constitutional: He is oriented to person, place, and time and well-developed, well-nourished, and in no distress.   HENT:   Head: Normocephalic and atraumatic.   Eyes: EOM are normal. Pupils are equal, round, and reactive to light.   Neck: Normal range of motion. Neck supple.   Cardiovascular: Normal rate, regular rhythm and normal heart sounds.    Pulmonary/Chest: Effort normal and breath sounds normal. No respiratory distress.   Abdominal: Soft. There is no tenderness. There is no rebound and no guarding.   Musculoskeletal: Normal range of motion. He exhibits no edema.   Neurological: He is alert and oriented to person, place, and time. He has normal sensation.   Existing RLE sided weakness from previous stroke   Skin: Skin is warm and dry.   Psychiatric: Mood and affect normal.   Nursing note and vitals reviewed.      LAB RESULTS  Lab Results (last 24 hours)     Procedure Component Value Units Date/Time    Blood Culture [[id number]] Collected:  01/07/17 0032    Specimen:  Blood from Arm, Left Updated:  01/07/17 0042    CBC & Differential [[id number]] Collected:  01/07/17 0033    Specimen:  Blood Updated:  01/07/17 0050    Narrative:       The following orders were created for panel order CBC & Differential.  Procedure                               Abnormality         Status                     ---------                               -----------         ------                     CBC Auto Differential[[id number]]         Abnormal            Final result                 Please view results for these tests on the individual orders.    Comprehensive Metabolic Panel [[id number]]  (Abnormal) Collected:  01/07/17 0033    Specimen:  Blood Updated:  01/07/17 0127     Glucose 127 (H) mg/dL      BUN 11 mg/dL      Creatinine 0.78 mg/dL      Sodium 142 mmol/L      Potassium 3.9  mmol/L      Chloride 101 mmol/L      CO2 21.2 (L) mmol/L      Calcium 9.3 mg/dL      Total Protein 7.6 g/dL      Albumin 4.00 g/dL      ALT (SGPT) 27 U/L      AST (SGOT) 16 U/L      Alkaline Phosphatase 98 U/L      Total Bilirubin 0.4 mg/dL      eGFR Non African Amer 103 mL/min/1.73      eGFR  African Amer 125 mL/min/1.73      Globulin 3.6 gm/dL      A/G Ratio 1.1 g/dL      BUN/Creatinine Ratio 14.1      Anion Gap 19.8 mmol/L     Protime-INR [[id number]]  (Abnormal) Collected:  01/07/17 0033    Specimen:  Blood Updated:  01/07/17 0059     Protime 24.2 (H) Seconds      INR 2.26 (H)     Phenytoin Level, Total [[id number]]  (Abnormal) Collected:  01/07/17 0033    Specimen:  Blood Updated:  01/07/17 0128     Phenytoin Level 7.2 (L) mcg/mL     CBC Auto Differential [[id number]]  (Abnormal) Collected:  01/07/17 0033    Specimen:  Blood Updated:  01/07/17 0050     WBC 12.65 (H) 10*3/mm3      RBC 5.60 10*6/mm3      Hemoglobin 14.9 g/dL      Hematocrit 47.4 %      MCV 84.6 fL      MCH 26.6 (L) pg      MCHC 31.4 (L) g/dL      RDW 14.6 (H) %      RDW-SD 44.9 fl      MPV 10.3 fL      Platelets 211 10*3/mm3      Neutrophil % 89.7 (H) %      Lymphocyte % 7.2 (L) %      Monocyte % 2.4 (L) %      Eosinophil % 0.4 %      Basophil % 0.1 %      Immature Grans % 0.2 %      Neutrophils, Absolute 11.35 (H) 10*3/mm3      Lymphocytes, Absolute 0.91 10*3/mm3      Monocytes, Absolute 0.30 10*3/mm3      Eosinophils, Absolute 0.05 10*3/mm3      Basophils, Absolute 0.01 10*3/mm3      Immature Grans, Absolute 0.03 10*3/mm3     Levetiracetam Level (Keppra) [[id number]] Collected:  01/07/17 0033    Specimen:  Blood Updated:  01/07/17 0100    Procalcitonin [[id number]]  (Abnormal) Collected:  01/07/17 0033    Specimen:  Blood Updated:  01/07/17 0115     Procalcitonin 0.47 (H) ng/mL     Narrative:       As a Marker for Sepsis (Non-Neonates):   1. <0.5 ng/mL represents a low risk of severe sepsis and/or septic shock.  1. >2 ng/mL represents a high risk of  "severe sepsis and/or septic shock.    As a Marker for Lower Respiratory Tract Infections that require antibiotic therapy:  PCT on Admission     Antibiotic Therapy             6-12 Hrs later  > 0.5                Strongly Recommended            >0.25 - <0.5         Recommended  0.1 - 0.25           Discouraged                   Remeasure/reassess PCT  <0.1                 Strongly Discouraged          Remeasure/reassess PCT      As 28 day mortality risk marker: \"Change in Procalcitonin Result\" (> 80 % or <=80 %) if Day 0 (or Day 1) and Day 4 values are available. Refer to http://www.[facility]-pct-calculator.com/   Change in PCT <=80 %   A decrease of PCT levels below or equal to 80 % defines a positive change in PCT test result representing a higher risk for 28-day all-cause mortality of patients diagnosed with severe sepsis or septic shock.  Change in PCT > 80 %   A decrease of PCT levels of more than 80 % defines a negative change in PCT result representing a lower risk for 28-day all-cause mortality of patients diagnosed with severe sepsis or septic shock.                Blood Culture [[id number]] Collected:  01/07/17 0034    Specimen:  Blood from Arm, Right Updated:  01/07/17 0042    Influenza Antigen [[id number]]  (Abnormal) Collected:  01/07/17 0038    Specimen:  Swab from Nasopharynx Updated:  01/07/17 0058     Influenza A Ag, EIA Positive (A)      Influenza B Ag, EIA Negative     Urinalysis With / Culture If Indicated [[id number]]  (Abnormal) Collected:  01/07/17 0042    Specimen:  Urine from Urine, Catheter Updated:  01/07/17 0054     Color, UA Yellow      Appearance, UA Cloudy (A)      pH, UA 5.5      Specific Gravity, UA 1.014      Glucose, UA Negative      Ketones, UA Negative      Bilirubin, UA Negative      Blood, UA Large (3+) (A)      Protein, UA 30 mg/dL (1+) (A)      Leuk Esterase, UA Moderate (2+) (A)      Nitrite, UA Negative      Urobilinogen, UA 0.2 E.U./dL     Urinalysis, Microscopic Only [[id number]]  " (Abnormal) Collected:  01/07/17 0042    Specimen:  Urine from Urine, Catheter Updated:  01/07/17 0105     RBC, UA Too Numerous to Count (A) /HPF      WBC, UA Too Numerous to Count (A) /HPF      Bacteria, UA 4+ (A) /HPF      Squamous Epithelial Cells, UA 0-2 /HPF      Hyaline Casts, UA 3-6 /LPF      Methodology Manual Light Microscopy     Urine Culture [[id number]] Collected:  01/07/17 0042    Specimen:  Urine from Urine, Catheter Updated:  01/07/17 0053    Lactic Acid, Plasma [[id number]]  (Abnormal) Collected:  01/07/17 0103    Specimen:  Blood Updated:  01/07/17 0130     Lactate 3.3 (C) mmol/L       I ordered the above labs and reviewed the results    RADIOLOGY  CT Head Without Contrast   Preliminary Result   No acute intracranial pathology.  Extensive changes of small vessel   ischemic disease, a small infarct in the midst of these findings cannot   be entirely excluded. If there is concern for an acute infarct and MRI   examination may be performed.                  XR Chest 1 View   Preliminary Result   No acute findings.               I ordered the above noted radiological studies. Interpreted by radiologist. Reviewed by me in PACS.       PROCEDURES  Critical Care  Performed by: JULES SOLOMON III  Authorized by: JANAK MIXON   Total critical care time: 30 minutes  Critical care time was exclusive of separately billable procedures and treating other patients.  Critical care was necessary to treat or prevent imminent or life-threatening deterioration of the following conditions: sepsis.  Critical care was time spent personally by me on the following activities: development of treatment plan with patient or surrogate, discussions with consultants, evaluation of patient's response to treatment, examination of patient, obtaining history from patient or surrogate, ordering and performing treatments and interventions, ordering and review of laboratory studies, ordering and review of radiographic studies  and re-evaluation of patient's condition.            PROGRESS AND CONSULTS  ED Course     1:35 AM  Discussed Pt's case with Dr. Schrader who agrees with the plan of care and will consult.     3:18 AM  Discussed Pt's case with Dr. Palomares who agrees to admit. Temp 99.7 at this time.     MEDICAL DECISION MAKING  Results were reviewed/discussed with the patient and they were also made aware of online access. Pt also made aware that some labs, such as cultures, will not be resulted during ER visit and follow up with PMD is necessary.     MDM  Number of Diagnoses or Management Options  Sepsis, due to unspecified organism:   Type A influenza:   UTI (urinary tract infection), bacterial:      Amount and/or Complexity of Data Reviewed  Clinical lab tests: ordered and reviewed (Lactate: 3.3  WBC: 12.65  )  Tests in the radiology section of CPT®: ordered and reviewed (XR Chest: Negative acute)  Discuss the patient with other providers: yes (Dr. Palomares)  Independent visualization of images, tracings, or specimens: yes    Critical Care  Total time providing critical care: 30-74 minutes    Patient Progress  Patient progress: stable         DIAGNOSIS  Final diagnoses:   UTI (urinary tract infection), bacterial   Sepsis, due to unspecified organism   Type A influenza       DISPOSITION  ADMISSION: Dr. Palomares    Discussed treatment plan and reason for admission with pt/family and admitting physician.  Pt/family voiced understanding of the plan for admission for further testing/treatment as needed.       Latest Documented Vital Signs:  As of 4:47 AM  BP- 114/79 HR- 110 Temp- 98.7 °F (37.1 °C) (Tympanic) O2 sat- 97%    --  Documentation assistance provided by carrie Martínez for Ck Pena PA-C.  Information recorded by the carrie was done at my direction and has been verified and validated by me.                Lizzette Martínez  01/07/17 0329       DURGA Bell III  01/07/17 [id number]       Toñito Pena III,  PA  01/07/17 0449

## 2017-01-07 NOTE — ED PROVIDER NOTES
History   Pt was sent to the ED from his NH with generalized shaking, possible seizure vs. Stroke. On arrival, temp was 102.6.     Exam  Pt is resting comfortablt, in mild distress, and is mildly ill apearing  Febrile, otherwise stable vital signs  Tachycardic with regular rhythm   Lungs are clear bilaterally  Abd is soft and non tender    Course  Plan to admit due to sepsis and UTI.     Attestation:  I supervised care provided by the midlevel provider.    We have discussed this patient's history, physical exam, and treatment plan.    I have reviewed the note and personally saw and examined the patient and agree with the plan of care.  I agree with the midlevel provider's findings and plan. I reviewed the midlevel providers note.     Documentation assistance provided by carrie Becerra for Dr Schrader. Information recorded by the scribe was done at my direction and has been verified and validated by me.       Radha Becerra  01/07/17 0156       Fran Schrader MD  01/07/17 0448

## 2017-01-07 NOTE — PLAN OF CARE
Problem: Patient Care Overview (Adult)  Goal: Plan of Care Review  Outcome: Ongoing (interventions implemented as appropriate)    01/07/17 1537   Coping/Psychosocial Response Interventions   Plan Of Care Reviewed With patient   Patient Care Overview   Progress no change   Outcome Evaluation   Outcome Summary/Follow up Plan Pt doing okay today. Very drowsy and lethargic still. Arousable to voice, oriented. Not alert enough to eat. Isa believes condition is a result of the flu. Sleeping between care. Will continue to monitor patient.        Goal: Adult Individualization and Mutuality  Outcome: Ongoing (interventions implemented as appropriate)  Goal: Discharge Needs Assessment  Outcome: Ongoing (interventions implemented as appropriate)    Problem: Sepsis (Adult)  Goal: Signs and Symptoms of Listed Potential Problems Will be Absent or Manageable (Sepsis)  Outcome: Ongoing (interventions implemented as appropriate)    Problem: Fall Risk (Adult)  Goal: Identify Related Risk Factors and Signs and Symptoms  Outcome: Ongoing (interventions implemented as appropriate)  Goal: Absence of Falls  Outcome: Ongoing (interventions implemented as appropriate)    Problem: Dysphagia (Adult)  Goal: Identify Related Risk Factors and Signs and Symptoms  Outcome: Ongoing (interventions implemented as appropriate)  Goal: Functional/Safe Swallow  Outcome: Ongoing (interventions implemented as appropriate)  Goal: Compensatory Techniques to Improve Safety/Function with Swallowing  Outcome: Ongoing (interventions implemented as appropriate)

## 2017-01-07 NOTE — ED NOTES
[facility] reported pt having TIA like or seizure like activies. Pt appears to be anxious with mild tremors. .     Sarah Pelaez RN  01/06/17 2002

## 2017-01-08 ENCOUNTER — APPOINTMENT (OUTPATIENT)
Dept: CARDIOLOGY | Facility: HOSPITAL | Age: 57
End: 2017-01-08

## 2017-01-08 ENCOUNTER — APPOINTMENT (OUTPATIENT)
Dept: CT IMAGING | Facility: HOSPITAL | Age: 57
End: 2017-01-08

## 2017-01-08 LAB
ANION GAP SERPL CALCULATED.3IONS-SCNC: 11.7 MMOL/L
BACTERIA BLD CULT: ABNORMAL
BACTERIA BLD CULT: ABNORMAL
BACTERIA SPEC AEROBE CULT: ABNORMAL
BASOPHILS # BLD AUTO: 0.03 10*3/MM3 (ref 0–0.2)
BASOPHILS NFR BLD AUTO: 0.2 % (ref 0–1.5)
BUN BLD-MCNC: 7 MG/DL (ref 6–20)
BUN/CREAT SERPL: 9.9 (ref 7–25)
CALCIUM SPEC-SCNC: 8.6 MG/DL (ref 8.6–10.5)
CHLORIDE SERPL-SCNC: 104 MMOL/L (ref 98–107)
CO2 SERPL-SCNC: 23.3 MMOL/L (ref 22–29)
CREAT BLD-MCNC: 0.71 MG/DL (ref 0.76–1.27)
DEPRECATED RDW RBC AUTO: 46.4 FL (ref 37–54)
EOSINOPHIL # BLD AUTO: 0.16 10*3/MM3 (ref 0–0.7)
EOSINOPHIL NFR BLD AUTO: 1.3 % (ref 0.3–6.2)
ERYTHROCYTE [DISTWIDTH] IN BLOOD BY AUTOMATED COUNT: 14.9 % (ref 11.5–14.5)
GFR SERPL CREATININE-BSD FRML MDRD: 115 ML/MIN/1.73
GFR SERPL CREATININE-BSD FRML MDRD: 139 ML/MIN/1.73
GLUCOSE BLD-MCNC: 96 MG/DL (ref 65–99)
HBA1C MFR BLD: 6.29 % (ref 4.8–5.6)
HCT VFR BLD AUTO: 40.3 % (ref 40.4–52.2)
HGB BLD-MCNC: 12.3 G/DL (ref 13.7–17.6)
IMM GRANULOCYTES # BLD: 0.02 10*3/MM3 (ref 0–0.03)
IMM GRANULOCYTES NFR BLD: 0.2 % (ref 0–0.5)
INR PPP: 2.06 (ref 0.9–1.1)
LYMPHOCYTES # BLD AUTO: 2.71 10*3/MM3 (ref 0.9–4.8)
LYMPHOCYTES NFR BLD AUTO: 22.2 % (ref 19.6–45.3)
MCH RBC QN AUTO: 26.1 PG (ref 27–32.7)
MCHC RBC AUTO-ENTMCNC: 30.5 G/DL (ref 32.6–36.4)
MCV RBC AUTO: 85.4 FL (ref 79.8–96.2)
MONOCYTES # BLD AUTO: 0.79 10*3/MM3 (ref 0.2–1.2)
MONOCYTES NFR BLD AUTO: 6.5 % (ref 5–12)
NEUTROPHILS # BLD AUTO: 8.47 10*3/MM3 (ref 1.9–8.1)
NEUTROPHILS NFR BLD AUTO: 69.6 % (ref 42.7–76)
PLATELET # BLD AUTO: 180 10*3/MM3 (ref 140–500)
PMV BLD AUTO: 10.2 FL (ref 6–12)
POTASSIUM BLD-SCNC: 3.2 MMOL/L (ref 3.5–5.2)
PROCALCITONIN SERPL-MCNC: 5.51 NG/ML (ref 0.1–0.25)
PROTHROMBIN TIME: 22.5 SECONDS (ref 11.7–14.2)
RBC # BLD AUTO: 4.72 10*6/MM3 (ref 4.6–6)
SODIUM BLD-SCNC: 139 MMOL/L (ref 136–145)
TROPONIN T SERPL-MCNC: <0.01 NG/ML (ref 0–0.03)
TROPONIN T SERPL-MCNC: <0.01 NG/ML (ref 0–0.03)
WBC NRBC COR # BLD: 12.18 10*3/MM3 (ref 4.5–10.7)

## 2017-01-08 PROCEDURE — 99233 SBSQ HOSP IP/OBS HIGH 50: CPT | Performed by: INTERNAL MEDICINE

## 2017-01-08 PROCEDURE — 85025 COMPLETE CBC W/AUTO DIFF WBC: CPT | Performed by: INTERNAL MEDICINE

## 2017-01-08 PROCEDURE — 94762 N-INVAS EAR/PLS OXIMTRY CONT: CPT

## 2017-01-08 PROCEDURE — 99221 1ST HOSP IP/OBS SF/LOW 40: CPT | Performed by: INTERNAL MEDICINE

## 2017-01-08 PROCEDURE — 74176 CT ABD & PELVIS W/O CONTRAST: CPT

## 2017-01-08 PROCEDURE — 99232 SBSQ HOSP IP/OBS MODERATE 35: CPT | Performed by: INTERNAL MEDICINE

## 2017-01-08 PROCEDURE — 80048 BASIC METABOLIC PNL TOTAL CA: CPT | Performed by: INTERNAL MEDICINE

## 2017-01-08 PROCEDURE — C8929 TTE W OR WO FOL WCON,DOPPLER: HCPCS

## 2017-01-08 PROCEDURE — 94799 UNLISTED PULMONARY SVC/PX: CPT

## 2017-01-08 PROCEDURE — 25010000002 MAGNESIUM SULFATE IN D5W 1G/100ML (PREMIX) 10-5 MG/ML-% SOLUTION: Performed by: INTERNAL MEDICINE

## 2017-01-08 PROCEDURE — 80186 ASSAY OF PHENYTOIN FREE: CPT | Performed by: INTERNAL MEDICINE

## 2017-01-08 PROCEDURE — 83036 HEMOGLOBIN GLYCOSYLATED A1C: CPT | Performed by: INTERNAL MEDICINE

## 2017-01-08 PROCEDURE — 85610 PROTHROMBIN TIME: CPT | Performed by: INTERNAL MEDICINE

## 2017-01-08 PROCEDURE — 25010000002 PIPERACILLIN SOD-TAZOBACTAM PER 1 G: Performed by: INTERNAL MEDICINE

## 2017-01-08 PROCEDURE — 84145 PROCALCITONIN (PCT): CPT | Performed by: INTERNAL MEDICINE

## 2017-01-08 PROCEDURE — 93010 ELECTROCARDIOGRAM REPORT: CPT | Performed by: INTERNAL MEDICINE

## 2017-01-08 PROCEDURE — 80185 ASSAY OF PHENYTOIN TOTAL: CPT | Performed by: INTERNAL MEDICINE

## 2017-01-08 PROCEDURE — 87040 BLOOD CULTURE FOR BACTERIA: CPT | Performed by: INTERNAL MEDICINE

## 2017-01-08 PROCEDURE — 93005 ELECTROCARDIOGRAM TRACING: CPT | Performed by: NURSE PRACTITIONER

## 2017-01-08 PROCEDURE — 99222 1ST HOSP IP/OBS MODERATE 55: CPT | Performed by: PSYCHIATRY & NEUROLOGY

## 2017-01-08 PROCEDURE — 93306 TTE W/DOPPLER COMPLETE: CPT | Performed by: INTERNAL MEDICINE

## 2017-01-08 PROCEDURE — 25010000002 PERFLUTREN (DEFINITY) 8.476 MG IN SODIUM CHLORIDE 10 ML INJECTION: Performed by: FAMILY MEDICINE

## 2017-01-08 PROCEDURE — 84484 ASSAY OF TROPONIN QUANT: CPT | Performed by: NURSE PRACTITIONER

## 2017-01-08 PROCEDURE — 25010000002 LEVETIRACETAM IN NACL 0.82% 500 MG/100ML SOLUTION: Performed by: INTERNAL MEDICINE

## 2017-01-08 RX ADMIN — LEVETIRACETAM 500 MG: 5 INJECTION INTRAVENOUS at 20:11

## 2017-01-08 RX ADMIN — SODIUM CHLORIDE 200 MG PE: 9 INJECTION, SOLUTION INTRAVENOUS at 08:42

## 2017-01-08 RX ADMIN — PERFLUTREN 2 ML: 6.52 INJECTION, SUSPENSION INTRAVENOUS at 12:36

## 2017-01-08 RX ADMIN — SODIUM CHLORIDE 150 MG PE: 9 INJECTION, SOLUTION INTRAVENOUS at 18:15

## 2017-01-08 RX ADMIN — METOPROLOL TARTRATE 2.5 MG: 5 INJECTION INTRAVENOUS at 20:12

## 2017-01-08 RX ADMIN — MAGNESIUM SULFATE HEPTAHYDRATE 1 G: 1 INJECTION, SOLUTION INTRAVENOUS at 14:36

## 2017-01-08 RX ADMIN — TAZOBACTAM SODIUM AND PIPERACILLIN SODIUM 3.38 G: 375; 3 INJECTION, SOLUTION INTRAVENOUS at 08:42

## 2017-01-08 RX ADMIN — METOPROLOL TARTRATE 2.5 MG: 5 INJECTION INTRAVENOUS at 14:36

## 2017-01-08 RX ADMIN — LEVETIRACETAM 500 MG: 5 INJECTION INTRAVENOUS at 08:42

## 2017-01-08 RX ADMIN — LEVETIRACETAM 500 MG: 5 INJECTION INTRAVENOUS at 00:01

## 2017-01-08 RX ADMIN — TAZOBACTAM SODIUM AND PIPERACILLIN SODIUM 3.38 G: 375; 3 INJECTION, SOLUTION INTRAVENOUS at 18:14

## 2017-01-08 RX ADMIN — SODIUM CHLORIDE 100 ML/HR: 4.5 INJECTION, SOLUTION INTRAVENOUS at 20:11

## 2017-01-08 RX ADMIN — SODIUM CHLORIDE 300 MG PE: 9 INJECTION, SOLUTION INTRAVENOUS at 01:16

## 2017-01-08 RX ADMIN — SODIUM CHLORIDE 100 ML/HR: 4.5 INJECTION, SOLUTION INTRAVENOUS at 08:36

## 2017-01-08 NOTE — PLAN OF CARE
Problem: Patient Care Overview (Adult)  Goal: Plan of Care Review  Outcome: Ongoing (interventions implemented as appropriate)    01/08/17 0408   Coping/Psychosocial Response Interventions   Plan Of Care Reviewed With patient   Patient Care Overview   Progress no change       Goal: Adult Individualization and Mutuality  Outcome: Ongoing (interventions implemented as appropriate)    01/08/17 0408   Individualization   Patient Specific Goals get to eat   Patient Specific Interventions remains npo, hob up.         Problem: Sepsis (Adult)  Goal: Signs and Symptoms of Listed Potential Problems Will be Absent or Manageable (Sepsis)  Outcome: Ongoing (interventions implemented as appropriate)    01/08/17 0408   Sepsis   Problems Assessed (Sepsis) infection progression   Problems Present (Sepsis) progression of infection         Problem: Fall Risk (Adult)  Goal: Identify Related Risk Factors and Signs and Symptoms  Outcome: Ongoing (interventions implemented as appropriate)    01/08/17 0408   Fall Risk   Fall Risk: Related Risk Factors fatigue/slow reaction;gait/mobility problems;age-related changes       Goal: Absence of Falls  Outcome: Ongoing (interventions implemented as appropriate)    01/08/17 0408   Fall Risk (Adult)   Absence of Falls making progress toward outcome         Problem: Dysphagia (Adult)  Goal: Identify Related Risk Factors and Signs and Symptoms  Outcome: Ongoing (interventions implemented as appropriate)    01/08/17 0408   Dysphagia   Dysphagia: Related Risk Factors mental status altered   General Observations Signs and Symptoms (Dysphagia) other (see comments)  (sleepy)       Goal: Functional/Safe Swallow  Outcome: Ongoing (interventions implemented as appropriate)    01/08/17 0408   Dysphagia (Adult)   Functional/Safe Swallow making progress toward outcome

## 2017-01-08 NOTE — CONSULTS
[facility]  Cardiology Consult Note    Patient Identification:  Name: Servando Kapadia  Age: 56 y.o.  Sex: male  :  1960  MRN: [id number]             Requesting Physician: Dr Palomares    Reason for Consultation / Chief Complaint: Paroxysmal A. fib, anticoagulation    History of Present Illness:   This patient is a 56-year-old male of [place] descent who resides at a nursing facility. He is on Coumadin for history of paroxysmal A. Fib and carries history of previous stroke and seizure disorder.  Patient presents to the ER with seizure-like activity, temperature 102.6.  Awake and alert in the ER with a heart rate of 140, blood pressure 174/113.  He is being treated for influenza A.  One set of blood cultures showing Streptococcus at <24 hours.  Urine culture positive for gram-negative bacilli.  We've been asked to see this patient regarding an episode of PAF that occurred last evening.     This morning the patient is somewhat lethargic but arousable.  Follows commands.  He is oriented to person and place.  He denies any history of CAD, chest pain, pressure, palpitations, shortness of breath or syncope.  No records of previous ischemic workup found within the computer database.    There is no EKG available for review.  There is telemetry strip (currently on scan) that showed a 6 second episode of PAF at approximately 140 bpm last evening at 1940.  Magnesium at that time 1.2.  The episode was self terminating.  Since then, his rhythm has been a regular sinus in the 80s.  Blood pressure 137/96.  There are no cardiac enzymes available for review.  TSH normal  BNP 1066 with unremarkable chest x-ray.  CT of the head shows evidence of small vessel ischemic disease, old lacunar infarcts, cortical atrophy-to small infarct in the midst of these findings could not be entirely excluded.    Comorbid cardiac risk factors: Hypertension, dyslipidemia, diabetes    Past Medical History:  Past Medical History    Diagnosis Date   • Anemia    • Atrial fibrillation    • Atrial fibrillation    • Benign prostatic hyperplasia with lower urinary tract symptoms    • Cerebral infarct    • Chronic obstructive pyelonephritis    • Constipation    • Depression    • Diabetes mellitus      type 2   • Enlarged prostate    • Hemiplegia and hemiparesis    • Hyperlipidemia    • Hypertension    • Kidney stone    • Seizures    • Sleep apnea    • Stroke      apparent right side residual weakness   • Urinary incontinence      Past Surgical History:  Past Surgical History   Procedure Laterality Date   • Cystoscopy w/ ureteral stent placement N/A 5/3/2016     Procedure: CYSTOSCOPY LASER LITHOTRIPSY LEFT RETROGRADE URETERAL CATHETER AND STENT PLACEMENT;  Surgeon: Eduin Brennan MD;  Location: [facility];  Service:    • Endoscopy w/ peg tube placement N/A 5/13/2016     Procedure: ESOPHAGOGASTRODUODENOSCOPY WITH PERCUTANEOUS ENDOSCOPIC GASTROSTOMY TUBE INSERTION;  Surgeon: Lion Weber MD;  Location: [facility];  Service:    • Ureteroscopy laser lithotripsy with stent insertion N/A 6/8/2016     Procedure: URETEROSCOPY LASER LITHOTRIPSY STONE EXTRACTION WITH JJ STENT INSERTION;  Surgeon: Eduin Brennan MD;  Location: [facility] OR;  Service:       Allergies:  No Known Allergies  Home Meds:  Prescriptions Prior to Admission   Medication Sig Dispense Refill Last Dose   • acetaminophen (TYLENOL) 325 MG tablet 650 mg by Enteral route every 6 (six) hours as needed (for pain/elevated temperature). GIVEN PER FEEDING TUBE   not needed   • bisacodyl (DULCOLAX) 10 MG suppository Insert 10 mg into the rectum daily as needed for constipation.   not needed   • dextrose (GLUTOSE) 40 % gel Take 15 g by mouth as needed for low blood sugar (may give for BS<60 & symptomatic).   not needed   • escitalopram (LEXAPRO) 10 MG tablet 10 mg by Enteral route daily. GIVEN PER FEEDING TUBE   6/7/2016 at 2100   • ferrous sulfate 300 (60 FE) MG/5ML syrup  325 mg by Enteral route daily. PER FEEDING TUBE   6/7/2016 at 2100   • glucagon (GLUCAGEN) 1 MG injection Inject 1 mg into the shoulder, thigh, or buttocks 1 (one) time as needed for low blood sugar (give for BS<60 & symptomatic, may repeat x2 with 10mins apart).   not needed   • levETIRAcetam (KEPPRA) 500 MG tablet 500 mg by Enteral route 2 (two) times a day. GIVEN AT 0900 & 2100 PER FEEDING TUBE   6/7/2016 at 2100   • magnesium hydroxide (MILK OF MAGNESIA) 400 MG/5ML suspension 30 mL by Enteral route daily as needed for constipation. PER FEEDING TUBE   not needed   • metFORMIN (GLUCOPHAGE) 1000 MG tablet 1,000 mg by Enteral route 2 (two) times a day with meals. given at 0730 & 1630 PER FEEDING TUBE   6/7/2016 at 1630   • phenytoin (DILANTIN) 125 MG/5ML suspension Take 300 mg by mouth Every Night.      • sennosides-docusate sodium (SENOKOT-S) 8.6-50 MG tablet 2 tablets by Enteral route every night. GIVEN PER FEEDING TUBE   6/7/2016 at 2100   • simvastatin (ZOCOR) 10 MG tablet 10 mg by Enteral route every night. GIVEN PER FEEDING TUBE   6/7/2016 at 2100   • warfarin (COUMADIN) 4 MG tablet Take 4 mg by mouth Daily.      • phenytoin (DILANTIN) 100 MG ER capsule 200 mg by Enteral route every night. GIVEN PER FEEDING TUBE   6/7/2016 at 2100   • warfarin (COUMADIN) 3 MG tablet 4 mg by Enteral route daily. Give 1 tablet by mouth at bedtime related to unspecified atrial fibrillation PER FEEDING TUBE  HOLDING FOR SURGERY   6/3/2016     Current Meds:   [unfilled]  Social History:   Social History   Substance Use Topics   • Smoking status: Never Smoker   • Smokeless tobacco: Never Used      Comment: pt unable to answer questions   • Alcohol use No      Family History:  History reviewed. No pertinent family history.     Review of Systems    Constitutional: No chills   Eyes: No eye pain   ENT/oropharynx: No difficulty swallowing, sore throat, epistaxis, changes in hearing   Cardiovascular: No chest pain, chest tightness,  palpitations, paroxysmal nocturnal dyspnea, orthopnea, diaphoresis, dizziness / syncopal episode   Respiratory: No shortness of breath, dyspnea on exertion, cough, wheezing hemoptysis   Gastrointestinal: No abdominal pain, nausea, vomiting, diarrhea   Genitourinary: No dysuria   Neurological: No headache + seizure    Musculoskeletal: No cramps, myalgias,  Neck  pain   Integument: No edema           Constitutional:  Temp:  [98.3 °F (36.8 °C)-99 °F (37.2 °C)] 98.5 °F (36.9 °C)  Heart Rate:  [84-99] 99  Resp:  [16-20] 16  BP: (109-141)/(77-99) 141/99    Physical Exam by MARY Martino  General:  Well-developed well nourished male of [place] descent.  Resting quietly sleepy but arousable Appears in no acute distress    HEENT:  No JVD. Thyroid not visibly enlarged. No mucosal pallor or cyanosis.  Oral mucosa dry  Respiratory: Respirations regular and unlabored at rest.  BBS with good air entry in all fields. No crackles or wheezes auscultated  Cardiovascular: S1S2 Regular rate and rhythm. No murmur or gallop auscultated. No carotid bruits. DP pulses 1   No pretibial pitting edema with TEDs and SCDs in place      Physical Exam by Dr Crum      General:  Well-developed well nourished male of [place] descent.  Resting quietly sleepy but arousable Appears in no acute distress  Eyes: PERTL,  HEENT:  No JVD. Thyroid not visibly enlarged. No mucosal pallor or cyanosis.  Oral mucosa dry  Respiratory: Respirations regular and unlabored at rest.  BBS with good air entry in all fields. No crackles or wheezes auscultated  Cardiovascular: S1S2 Regular rate and rhythm. No murmur or gallop auscultated. No carotid bruits. DP pulses 1   No pretibial pitting edema with TEDs and SCDs in place  Gastrointestinal: Abdomen soft,  non tender. Bowel sounds present. No ascites  Musculoskeletal: CHRISTIANSON x weakly.  Lower extremity weakness. No abnormal movements  Extremities: No digital cyanosis  Skin: Skin warm and dry to touch. No rashes     Neuro: AAO x2 CN II-XII grossly intact    Cardiographics  ECG: None available for review    Telemetry:  Unscanned episode of PAF RVR at ~140  last evening at 7:47 PM lasting less than 10 seconds before spontaneously converting back to sinus rhythm.      Echocardiogram: Pending    Imaging  Chest X-ray FINDINGS: The cardiomediastinal silhouette is normal. The lungs are clear and the costophrenic sulci are dry.  IMPRESSION: Negative.      HEAD CT IMPRESSION:  No acute intracranial pathology.There are extensive  areas of low attenuation bilaterally suggesting small vessel ischemic disease, there are old lacunar infarcts and cortical atrophy. Extensive changes of small vessel ischemic disease, a small infarct in the midst of these findings cannot be entirely excluded. If there is concern for an acute infarct and MRI  examination may be performed.      Lab Review         Results from last 7 days  Lab Units 01/07/17  2213   MAGNESIUM mg/dL 1.7       Results from last 7 days  Lab Units 01/08/17  0327   SODIUM mmol/L 139   POTASSIUM mmol/L 3.2*   BUN mg/dL 7   CREATININE mg/dL 0.71*   CALCIUM mg/dL 8.6       Results from last 7 days  Lab Units 01/08/17  0327 01/07/17  0732 01/07/17  0033   WBC 10*3/mm3 12.18* 22.73* 12.65*   HEMOGLOBIN g/dL 12.3* 12.7* 14.9   HEMATOCRIT % 40.3* 39.7* 47.4   PLATELETS 10*3/mm3 180 196 211       Results from last 7 days  Lab Units 01/08/17  0327 01/07/17  0732 01/07/17  0033   INR  2.06* 2.46* 2.26*         Assessment:    - PAF rvr   - s/p Hypomagnesia  - h/o PAF  - elevated [id number] score -> on Coumadin for INR therapeutic  - HTN  - Dyslipidemia  - Seizure  - Febrile illness  - Influenza A  - h/o CVA  - h/o seizure disorder    Recommendations / Plan:     In summary, this is a 56-year-old male with history of paroxysmal A. fib on chronic anticoagulation who presented from nursing facility following a seizure with fever of 102, uncontrolled hypertension, influenza A, UTI and possible  bacteremia.  He  had a brief episode of paroxysmal A. fib with RVR last evening in the setting of magnesium level I.2.  His INR is therapeutic.  He will be started on IV Lopressor while he is NPO and  Lovenox can be used as bridge therapy until Coumadin is resumed.  Baseline EKG, serial cardiac enzymes and 2-D echo have been ordered.  Further ischemic workup once medically stable.  We'll continue to follow with further recommendations to treatment plan pending.  Thank you for allowing us to participate in his care    I reviewed the patient's clinical results, medications and treatment plan . I personally viewed and interpreted the patient's EKG/Telemetry data      Patient personally interviewed and above subjective findings personally confirmed during a face to face contact with patient today  All findings of physical examination confirmed  All labs, cardiac procedures rtinent radiographs of the last 24 hours personally reviewed  Impression and plans discussed/elaborated and implemented jointly as described above         Jairo Crum MD  1/8/2017, 7:44 AM      EMR Dragon/Transcription disclaimer:   Much of this encounter note is an electronic transcription/translation of spoken language to printed text. The electronic translation of spoken language may permit erroneous, or at times, nonsensical words or phrases to be inadvertently transcribed; Although I have reviewed the note for such errors, some may still exist.

## 2017-01-08 NOTE — SIGNIFICANT NOTE
Bedside swallow attempted (1) 8oz glass water given without straw. Pt drank at once and vocally sound wet will speaking. No clearing of throat, No coughing noted but sounded as if he was gargling water while speaking.

## 2017-01-08 NOTE — PROGRESS NOTES
" LOS: 1 day     Chief Complaint:  Follow-up flu    Interval History:  Blood cultures returned w/ Strep in 1/2 sets. Zosyn restated. He is more awake and alert today. His only complaint is \"stroke.\" he is tolerating antibiotics w/o rash or diarrhea. He denies CP and SOA.    ROS: no n/v/d    Vital Signs  Temp:  [98.4 °F (36.9 °C)-99 °F (37.2 °C)] 98.6 °F (37 °C)  Heart Rate:  [83-99] 83  Resp:  [16-20] 16  BP: (110-141)/(77-99) 137/96    Physical Exam:  General: more awake and alert today  Head: round facies  Eyes: PERRL, EOMI, no scleral icterus  ENT: MM dry, OP clear, no thrush. Poor dentition.    Neck: Suppl  Cardiovascular: NR, RR, no murmurs, rubs; no LE edema  Respiratory: normal work of breathing on ambient air  GI: Abdomen is obese, soft, non-tender, non-distended  : no Beyer catheter present  Musculoskeletal: no joint abnormalities, normal musculature  Skin: No rashes, lesions, or embolic phenomenon  Neurological: 5/5 UE strength  Psychiatric: Alert and oriented x 2  Vasc: no cyanosis; PIV w/o erythema    Antibiotics:  •  oseltamivir (TAMIFLU) capsule 75 mg, 75 mg, Oral, Q12H, Bogdan Palomares MD, 75 mg at 01/07/17 1026  •  piperacillin-tazobactam (ZOSYN) 3.375 g in dextrose 50 mL IVPB (premix), 3.375 g, Intravenous, Q8H, Ramón Moss MD    LABS:  CBC, BMP, Procal, micro reviewed today  Lab Results   Component Value Date    WBC 12.18 (H) 01/08/2017    HGB 12.3 (L) 01/08/2017    HCT 40.3 (L) 01/08/2017    MCV 85.4 01/08/2017     01/08/2017     Lab Results   Component Value Date    GLUCOSE 96 01/08/2017    BUN 7 01/08/2017    CREATININE 0.71 (L) 01/08/2017    EGFRIFNONA 115 01/08/2017    EGFRIFAFRI 139 01/08/2017    BCR 9.9 01/08/2017    CO2 23.3 01/08/2017    CALCIUM 8.6 01/08/2017    ALBUMIN 4.00 01/07/2017    LABIL2 1.1 01/07/2017    AST 16 01/07/2017    ALT 27 01/07/2017     Flu A Ag +  Procal 10---->5  UA TNTC WBCs and RBCs  A1c 6.3%     Microbiology:  1/7 BCx: Strep species in 1/2 " sets  1/7 UCx: >100k GNR  1/7 RVP: negative     Radiology (personally reviewed):  CXR negative    Assessment/Plan   1. Sepsis due to Strep bacteremia - species pending  (NEW PROBLEM)  -I restarted Zosyn 3.375 g q8h for the time being; stopped vancomycin  -will f/u 2nd blood culture  -obtain TTE  -repeat blood cultures today to document clearance    2. Influenza A  -continue tamiflu 75 mg PO BID x 5 days     3. TME - better today.     4. Pyuria - f/u urology evaluation     Thank you for this consult. ID will follow.

## 2017-01-08 NOTE — PLAN OF CARE
Problem: Patient Care Overview (Adult)  Goal: Plan of Care Review  Outcome: Ongoing (interventions implemented as appropriate)    01/08/17 1502   Coping/Psychosocial Response Interventions   Plan Of Care Reviewed With patient   Patient Care Overview   Progress no change   Outcome Evaluation   Outcome Summary/Follow up Plan pt drowsy, arousable follows all commands, abx per md for (+) blood cultures, ct abd to r/o stone, results (-) attempted bedside eval 1 glass water given gargling noted vocally after drinking        Goal: Adult Individualization and Mutuality  Outcome: Ongoing (interventions implemented as appropriate)    Problem: Sepsis (Adult)  Goal: Signs and Symptoms of Listed Potential Problems Will be Absent or Manageable (Sepsis)  Outcome: Ongoing (interventions implemented as appropriate)    01/08/17 1502   Sepsis   Problems Assessed (Sepsis) infection progression   Problems Present (Sepsis) progression of infection         Problem: Fall Risk (Adult)  Goal: Identify Related Risk Factors and Signs and Symptoms  Outcome: Ongoing (interventions implemented as appropriate)    01/08/17 1502   Fall Risk   Fall Risk: Related Risk Factors fatigue/slow reaction;gait/mobility problems;age-related changes   Fall Risk: Signs and Symptoms presence of risk factors       Goal: Absence of Falls  Outcome: Ongoing (interventions implemented as appropriate)    01/08/17 1502   Fall Risk (Adult)   Absence of Falls making progress toward outcome         Problem: Dysphagia (Adult)  Goal: Identify Related Risk Factors and Signs and Symptoms  Outcome: Ongoing (interventions implemented as appropriate)    01/08/17 1502   Dysphagia   Dysphagia: Related Risk Factors mental status altered   General Observations Signs and Symptoms (Dysphagia) other (see comments)       Goal: Functional/Safe Swallow  Outcome: Ongoing (interventions implemented as appropriate)    01/08/17 1502   Dysphagia (Adult)   Functional/Safe Swallow making progress  toward outcome       Goal: Compensatory Techniques to Improve Safety/Function with Swallowing  Outcome: Ongoing (interventions implemented as appropriate)    01/08/17 1502   Dysphagia (Adult)   Compensatory Techniques to Improve Safety/Function with Swallowing making progress toward outcome

## 2017-01-08 NOTE — CONSULTS
Patient Identification:  NAME:  Servando Kapadia  Age:  56 y.o.   Sex:  male   :  1960   MRN:  [id number]       Chief complaint: Does not have one, reason for consult lethargy change in mental status seizures    History of present illness:  This patient is a 56-year-old [place] gentleman who suffered a stroke 14 years ago affecting his right side and leaving him with seizures he comes in now with lethargy and confusion duration unknown quality lethargy and confusion associated symptoms witnessed seizure activity tonic-clonic in type context patient with history of stroke and history of seizures modifying factors antibiotics for his urinary tract infection      Past medical history:  Past Medical History   Diagnosis Date   • Anemia    • Atrial fibrillation    • Atrial fibrillation    • Benign prostatic hyperplasia with lower urinary tract symptoms    • Cerebral infarct    • Chronic obstructive pyelonephritis    • Constipation    • Depression    • Diabetes mellitus      type 2   • Enlarged prostate    • Hemiplegia and hemiparesis    • Hyperlipidemia    • Hypertension    • Kidney stone    • Seizures    • Sleep apnea    • Stroke      apparent right side residual weakness   • Urinary incontinence        Past surgical history:  Past Surgical History   Procedure Laterality Date   • Cystoscopy w/ ureteral stent placement N/A 5/3/2016     Procedure: CYSTOSCOPY LASER LITHOTRIPSY LEFT RETROGRADE URETERAL CATHETER AND STENT PLACEMENT;  Surgeon: Eduin Brennan MD;  Location: [facility];  Service:    • Endoscopy w/ peg tube placement N/A 2016     Procedure: ESOPHAGOGASTRODUODENOSCOPY WITH PERCUTANEOUS ENDOSCOPIC GASTROSTOMY TUBE INSERTION;  Surgeon: Lion Wbeer MD;  Location: [facility] ENDOSCOPY;  Service:    • Ureteroscopy laser lithotripsy with stent insertion N/A 2016     Procedure: URETEROSCOPY LASER LITHOTRIPSY STONE EXTRACTION WITH JJ STENT INSERTION;  Surgeon: Eduin Brennan MD;  Location:   [facility];  Service:        Allergies:  Review of patient's allergies indicates no known allergies.    Home medications:  Prescriptions Prior to Admission   Medication Sig Dispense Refill Last Dose   • acetaminophen (TYLENOL) 325 MG tablet 650 mg by Enteral route every 6 (six) hours as needed (for pain/elevated temperature). GIVEN PER FEEDING TUBE   not needed   • bisacodyl (DULCOLAX) 10 MG suppository Insert 10 mg into the rectum daily as needed for constipation.   not needed   • dextrose (GLUTOSE) 40 % gel Take 15 g by mouth as needed for low blood sugar (may give for BS<60 & symptomatic).   not needed   • escitalopram (LEXAPRO) 10 MG tablet 10 mg by Enteral route daily. GIVEN PER FEEDING TUBE   6/7/2016 at 2100   • ferrous sulfate 300 (60 FE) MG/5ML syrup 325 mg by Enteral route daily. PER FEEDING TUBE   6/7/2016 at 2100   • glucagon (GLUCAGEN) 1 MG injection Inject 1 mg into the shoulder, thigh, or buttocks 1 (one) time as needed for low blood sugar (give for BS<60 & symptomatic, may repeat x2 with 10mins apart).   not needed   • levETIRAcetam (KEPPRA) 500 MG tablet 500 mg by Enteral route 2 (two) times a day. GIVEN AT 0900 & 2100 PER FEEDING TUBE   6/7/2016 at 2100   • magnesium hydroxide (MILK OF MAGNESIA) 400 MG/5ML suspension 30 mL by Enteral route daily as needed for constipation. PER FEEDING TUBE   not needed   • metFORMIN (GLUCOPHAGE) 1000 MG tablet 1,000 mg by Enteral route 2 (two) times a day with meals. given at 0730 & 1630 PER FEEDING TUBE   6/7/2016 at 1630   • phenytoin (DILANTIN) 125 MG/5ML suspension Take 300 mg by mouth Every Night.      • sennosides-docusate sodium (SENOKOT-S) 8.6-50 MG tablet 2 tablets by Enteral route every night. GIVEN PER FEEDING TUBE   6/7/2016 at 2100   • simvastatin (ZOCOR) 10 MG tablet 10 mg by Enteral route every night. GIVEN PER FEEDING TUBE   6/7/2016 at 2100   • warfarin (COUMADIN) 4 MG tablet Take 4 mg by mouth Daily.      • phenytoin (DILANTIN) 100 MG ER  capsule 200 mg by Enteral route every night. GIVEN PER FEEDING TUBE   6/7/2016 at 2100   • warfarin (COUMADIN) 3 MG tablet 4 mg by Enteral route daily. Give 1 tablet by mouth at bedtime related to unspecified atrial fibrillation PER FEEDING TUBE  HOLDING FOR SURGERY   6/3/2016        Hospital medications:    docusate sodium 100 mg Oral BID   escitalopram 10 mg Oral Daily   ferrous sulfate 325 mg Oral Daily   fosphenytoin 200 mg PE Intravenous Daily   fosphenytoin 300 mg PE Intravenous Nightly   levETIRAcetam 500 mg Intravenous Q12H   metoprolol 2.5 mg Intravenous Q12H   oseltamivir 75 mg Oral Q12H   piperacillin-tazobactam 3.375 g Intravenous Q8H   sennosides-docusate sodium 2 tablet Oral Nightly   warfarin 3 mg Oral Daily       Pharmacy Consult - Pharmacy to dose     Pharmacy Consult - Pharmacy to dose     sodium chloride 100 mL/hr Last Rate: 100 mL/hr (01/08/17 0836)     •  acetaminophen  •  acetaminophen  •  bisacodyl  •  dextrose  •  magnesium hydroxide  •  magnesium sulfate in D5W 1g/100mL (PREMIX)  •  Morphine **AND** naloxone  •  nitroglycerin  •  Pharmacy Consult - Pharmacy to dose  •  Pharmacy Consult - Pharmacy to dose  •  promethazine  •  sodium chloride  •  sodium chloride    Family history:  History reviewed. No pertinent family history.    Social history:  Social History   Substance Use Topics   • Smoking status: Never Smoker   • Smokeless tobacco: Never Used      Comment: pt unable to answer questions   • Alcohol use No       Review of systems:    He is not able to answer any of this secondary to lethargy    Objective:  Vitals Ranges:   Temp:  [98.5 °F (36.9 °C)-98.9 °F (37.2 °C)] 98.9 °F (37.2 °C)  Heart Rate:  [70-99] 70  Resp:  [16-20] 20  BP: (130-146)/(77-99) 146/92      Physical Exam:  Awake but very lethargic I get him to awaken he speaks with severe dysarthria but can he does not appear to be a phasic.  Fund of knowledge poor attention span and concentration poor recent remote memory poor  language function is noted severe dysarthria but I don't think he has a aphasia he is in no distress pupils poorly reactive to 2 1-1/2 eyes are conjugate face decrease right nasolabial fold tongue midline to deviating to the right no other cranial nerves to be tested he is spastic really in all 4 extremities but has weakness that and spasticity that is greater on the right side some distal atrophy and contractures and bradykinesia reflexes trace throughout symmetrical toes possibly upgoing bilaterally.  Sensation coordination station and gait completely impossible for him to follow heart regular without murmur neck supple without bruits    Results review:   I reviewed the patient's new clinical results.    Data review:  Lab Results (last 24 hours)     Procedure Component Value Units Date/Time    Magnesium [[id number]]  (Normal) Collected:  01/07/17 2213    Specimen:  Blood Updated:  01/07/17 2251     Magnesium 1.7 mg/dL     Blood Culture [[id number]]  (Normal) Collected:  01/07/17 0032    Specimen:  Blood from Arm, Left Updated:  01/08/17 0101     Blood Culture No growth at 24 hours     Blood Culture ID, PCR [[id number]]  (Abnormal) Collected:  01/07/17 0032    Specimen:  Blood from Arm, Left Updated:  01/08/17 0321     BCID, PCR        Streptococcus spp, not A, B, or pneumoniae. Identification by BCID PCR. (A)    CBC & Differential [[id number]] Collected:  01/08/17 0327    Specimen:  Blood Updated:  01/08/17 0352    Narrative:       The following orders were created for panel order CBC & Differential.  Procedure                               Abnormality         Status                     ---------                               -----------         ------                     CBC Auto Differential[[id number]]         Abnormal            Final result                 Please view results for these tests on the individual orders.    CBC Auto Differential [[id number]]  (Abnormal) Collected:  01/08/17 0327    Specimen:  Blood Updated:   01/08/17 0352     WBC 12.18 (H) 10*3/mm3      RBC 4.72 10*6/mm3      Hemoglobin 12.3 (L) g/dL      Hematocrit 40.3 (L) %      MCV 85.4 fL      MCH 26.1 (L) pg      MCHC 30.5 (L) g/dL      RDW 14.9 (H) %      RDW-SD 46.4 fl      MPV 10.2 fL      Platelets 180 10*3/mm3      Neutrophil % 69.6 %      Lymphocyte % 22.2 %      Monocyte % 6.5 %      Eosinophil % 1.3 %      Basophil % 0.2 %      Immature Grans % 0.2 %      Neutrophils, Absolute 8.47 (H) 10*3/mm3      Lymphocytes, Absolute 2.71 10*3/mm3      Monocytes, Absolute 0.79 10*3/mm3      Eosinophils, Absolute 0.16 10*3/mm3      Basophils, Absolute 0.03 10*3/mm3      Immature Grans, Absolute 0.02 10*3/mm3     Phenytoin Level, Total & Free [[id number]] Collected:  01/08/17 0327    Specimen:  Blood Updated:  01/08/17 0354    Protime-INR [[id number]]  (Abnormal) Collected:  01/08/17 0327    Specimen:  Blood Updated:  01/08/17 0401     Protime 22.5 (H) Seconds      INR 2.06 (H)     Hemoglobin A1c [[id number]]  (Abnormal) Collected:  01/08/17 0327    Specimen:  Blood Updated:  01/08/17 0404     Hemoglobin A1C 6.29 (H) %     Narrative:       Hemoglobin A1C Ranges:    Increased Risk for Diabetes  5.7% to 6.4%  Diabetes                     >= 6.5%  Diabetic Goal                < 7.0%    Basic Metabolic Panel [[id number]]  (Abnormal) Collected:  01/08/17 0327    Specimen:  Blood Updated:  01/08/17 0421     Glucose 96 mg/dL      BUN 7 mg/dL      Creatinine 0.71 (L) mg/dL      Sodium 139 mmol/L      Potassium 3.2 (L) mmol/L      Chloride 104 mmol/L      CO2 23.3 mmol/L      Calcium 8.6 mg/dL      eGFR  African Amer 139 mL/min/1.73      eGFR Non African Amer 115 mL/min/1.73      BUN/Creatinine Ratio 9.9      Anion Gap 11.7 mmol/L     Narrative:       GFR Normal >60  Chronic Kidney Disease <60  Kidney Failure <15    Procalcitonin [[id number]]  (Abnormal) Collected:  01/08/17 0327    Specimen:  Blood Updated:  01/08/17 0432     Procalcitonin 5.51 (C) ng/mL     Narrative:       As a Marker  "for Sepsis (Non-Neonates):   1. <0.5 ng/mL represents a low risk of severe sepsis and/or septic shock.  1. >2 ng/mL represents a high risk of severe sepsis and/or septic shock.    As a Marker for Lower Respiratory Tract Infections that require antibiotic therapy:  PCT on Admission     Antibiotic Therapy             6-12 Hrs later  > 0.5                Strongly Recommended            >0.25 - <0.5         Recommended  0.1 - 0.25           Discouraged                   Remeasure/reassess PCT  <0.1                 Strongly Discouraged          Remeasure/reassess PCT      As 28 day mortality risk marker: \"Change in Procalcitonin Result\" (> 80 % or <=80 %) if Day 0 (or Day 1) and Day 4 values are available. Refer to [email or web address]   Change in PCT <=80 %   A decrease of PCT levels below or equal to 80 % defines a positive change in PCT test result representing a higher risk for 28-day all-cause mortality of patients diagnosed with severe sepsis or septic shock.  Change in PCT > 80 %   A decrease of PCT levels of more than 80 % defines a negative change in PCT result representing a lower risk for 28-day all-cause mortality of patients diagnosed with severe sepsis or septic shock.                Blood Culture [[id number]] Collected:  01/08/17 0918    Specimen:  Blood from Arm, Right Updated:  01/08/17 0949    Blood Culture [[id number]] Collected:  01/08/17 0956    Specimen:  Blood from Hand, Left Updated:  01/08/17 0956    Troponin [[id number]]  (Normal) Collected:  01/08/17 0918    Specimen:  Blood Updated:  01/08/17 1009     Troponin T <0.010 ng/mL     Narrative:       Troponin T Reference Ranges:  Less than 0.03 ng/mL:    Negative for AMI  0.03 to 0.09 ng/mL:      Indeterminant for AMI  Greater than 0.09 ng/mL: Positive for AMI    Urine Culture [[id number]]  (Abnormal)  (Susceptibility) Collected:  01/07/17 0042    Specimen:  Urine from Urine, Catheter Updated:  01/08/17 1115     Urine Culture >100,000 " CFU/mL Escherichia coli (A)     Susceptibility      Escherichia coli     JANETH     Ampicillin >=32 ug/ml Resistant     Ampicillin + Sulbactam >=32 ug/ml Resistant     Cefazolin >=64 ug/ml Resistant     Cefepime 2 ug/ml Susceptible     Ceftriaxone >=64 ug/ml Resistant     Ciprofloxacin >=4 ug/ml Resistant     Ertapenem <=0.5 ug/ml Susceptible     Gentamicin <=1 ug/ml Susceptible     Levofloxacin >=8 ug/ml Resistant     Nitrofurantoin <=16 ug/ml Susceptible     Piperacillin + Tazobactam <=4 ug/ml Susceptible     Tetracycline 2 ug/ml Susceptible     Trimethoprim + Sulfamethoxazole <=20 ug/ml Susceptible                    Blood Culture [[id number]]  (Abnormal) Collected:  01/07/17 0034    Specimen:  Blood from Arm, Right Updated:  01/08/17 1247     Blood Culture Abnormal Stain (A)      Gram Stain Result        Aerobic Bottle Gram positive cocci in clusters    Blood Culture ID, PCR [[id number]]  (Abnormal) Collected:  01/07/17 0034    Specimen:  Blood from Arm, Right Updated:  01/08/17 1247     BCID, PCR        Staphylococcus spp, not aureus. Identification by BCID PCR. (A)           Imaging:  Imaging Results (last 24 hours)     Procedure Component Value Units Date/Time    XR Chest 1 View [[id number]] Collected:  01/07/17 1314     Updated:  01/07/17 1801    Narrative:       PORTABLE CHEST X-RAY     HISTORY: Flu, cough, fever.     Portable chest x-ray is correlated with a chest x-ray performed just  after midnight last night and a chest x-ray 05/15/2016.     FINDINGS: The cardiomediastinal silhouette is normal. The lungs are  clear and the costophrenic sulci are dry.       Impression:       Negative.     This report was finalized on 1/7/2017 5:57 PM by Dr. Wilfrid Broderick MD.       XR Chest 1 View [[id number]] Collected:  01/07/17 0101     Updated:  01/07/17 2304    Narrative:       X-RAY CHEST 1 VIEW.     HISTORY: Fever, seizure.     COMPARISON: 5/15/2016.     FINDINGS:  Cardiomediastinal silhouette is within normal limits.  A  PICC line  previously seen on the chest x-ray has already been removed.     There is no consolidation or effusion.               Impression:       No acute findings.          This report was finalized on 1/7/2017 11:01 PM by Dr. Magaly Rapp MD.       CT Head Without Contrast [[id number]] Collected:  01/07/17 0236     Updated:  01/07/17 2311    Narrative:       CT SCAN OF THE BRAIN WITHOUT CONTRAST.     TECHNIQUE: Multiple axial images of the brain were obtained from the  vertex to the base of the brain.     HISTORY:  Seizure like activity.     COMPARISON:  No prior studies for comparison.     FINDINGS:   Midline structures are within normal limits, there is no hydrocephalus.   Gray-white matter differentiation is maintained.  There are extensive  areas of low attenuation bilaterally suggesting small vessel ischemic  disease, there are old lacunar infarcts and cortical atrophy.     Orbits and paranasal sinuses are within normal limits.  Mastoid air  cells are well aerated.          Impression:       No acute intracranial pathology.  Extensive changes of small vessel  ischemic disease, a small infarct in the midst of these findings cannot  be entirely excluded. If there is concern for an acute infarct and MRI  examination may be performed.     This report was finalized on 1/7/2017 11:08 PM by Dr. Magaly Rapp MD.       CT Abdomen Pelvis Without Contrast [[id number]] Collected:  01/08/17 1401     Updated:  01/08/17 1412    Narrative:       CT ABDOMEN PELVIS WO CONTRAST-     INDICATIONS: Abdominal pain, urinary tract infection, urinary stones     TECHNIQUE: Unenhanced ABDOMEN AND PELVIS CT, limited by arm down  position     COMPARISON: 05/03/2016     FINDINGS: Nonobstructive stones are seen in the right kidney, as large  as about 6 mm. Nonobstructive stones are also seen in the left kidney,  as large as 3 mm. No hydronephrosis. No stones are noted in the ureters.  Kidney is smaller in size since the prior exam.  Bilateral renal cysts  are apparent. Assessment abdomen for urinary lesions is limited without  intravenous contrast material.     Small gas in the urinary bladder may be related to catheterization, if  applicable, potentially evidence of infection.     Nonspecific thickening of the adrenal glands appears similar to prior  exam.     Otherwise unremarkable unenhanced appearance of the liver, spleen,  adrenal glands, pancreas, kidneys, bladder.     No bowel obstruction or abnormal bowel thickening is identified. The  gaseous distention of sigmoid colon appears similar to prior exam. Wall  thickening in the distal sigmoid colon/rectum, for example 12 mm on  image 107 of series 1 could for example reflect inflammation, infection,  potentially lesion, suggest clinical correlation and follow-up/direct  visualization.     No free intraperitoneal gas or free fluid.     Scattered small mesenteric and para-aortic lymph nodes are seen that are  not significant by size criteria.     Abdominal aorta is not aneurysmal. Aortic and other arterial  calcifications are present.     The lung bases show minimal left pleural effusion. An 11 mm  pleural-based nodular density at the left lower lobe on image 6 of  series 1 may be focal atelectasis, three-month follow-up chest CT  suggested to exclude possibility of pulmonary nodule. Bilateral  dependent atelectasis is present.     Degenerative changes are seen in the spine. No acute fracture is  identified. A subcutaneous device is evident at the left aspect of the  abdomen, with lead extending to the spine.             Impression:             1. Bilateral nonobstructive nephrolithiasis. No hydronephrosis. Gas in  the urinary bladder, correlate clinically.  2. Chronic gaseous distention sigmoid colon. Wall thickening in the  distal sigmoid colon/rectum, nonspecific as described, clinical  correlation and further evaluation/follow-up recommended.  3. Focal atelectasis versus pulmonary nodule  in the left lower lobe of  lung, follow-up recommended.     This report was finalized on 1/8/2017 2:09 PM by Dr. Rolf Naik MD.                Assessment and Plan:     Active Problems:    UTI (urinary tract infection), bacterial    It is almost certain that this gentleman had seizure activity at home secondary to decreased seizure threshold with fever and acute urinary tract infection.  He has a metabolic encephalopathy as well, but I have gotten him to awaken and converse with me and I believe there is no evidence of acute stroke or ongoing seizure activity.  Clearly he has evidence of previous stroke which would be a nidus for a seizure focus, with seizure activity on presentation.  I will continue to follow      Wilfrid Cobb MD  01/08/17  3:41 PM

## 2017-01-08 NOTE — H&P
[facility]  FAMILY AND INTERNAL MEDICINE  ELISEO COYLE, and JACQUI      INTERNAL MEDICINE ADMISSION HISTORY AND PE  Bogdan Palomares M.D.  2017            Patient Identification:  Name: Servando Kapadia  Age: 56 y.o.  Sex: male  :  1960  MRN: [id number]         Primary Care Physician: Dylan Cuba MD  LENGTH OF STAY 0 DAYS    Consults     Date and Time Order Name Status Description    2017 0637 Inpatient Consult to Infectious Diseases Completed     2017 0155 Family Medicine Consult Completed           Chief Complaint:  Fever with seizure-like activity      Subjective     Interval History: Patient is a 56 y.o.male who presented with fever to 102.6 and seizure-like activity in the [facility] ER.  Patient is a regular resident at [facility] [place] where he is followed by my partner Dr. Dylan Cuba.  Nursing staff apparently became very concerned last night and called 911 to send patient to hospital when they discovered that he was having seizure-like activity.  This was done before contacting the on-call attending, myself.  In the emergency room patient was found to have some residuals jerking but no evidence of active seizures it is noted that the patient had a stroke approximately 14 years ago and has had seizure activity since the time of that stroke.    Workup in the emergency room was done under the direction of Fran Schrader M.D. by physician assistant Toñito Pena.  On presentation patient was positive for fever to 102.6.,  Seizure-like activity, elevated heart rate of 140, elevated blood pressure of 174/113.  Physical exam was not very revealing but did show some existing right lower extremity weakness from a previous stroke.  Labs that were collected showed an elevated glucose of 127, normal liver and kidney function, INR of 2.26, Jamel 20 level slightly low at 7.2, WBCs 12.65, flu swab the patient's  nose came back positive for influenza A.  Patient was discussed with myself and a repeat temperature at the time that discussion was 99.7 it was felt that the patient had sepsis due to an unspecified organism with type a influenza activity that seem to be present and accounting for his temperature elevations.  There was also suggestion of a possible urinary tract infection.  Lactate level was 3.3, chest x-ray was negative.  Family discussed situation with the ER attending reported those discussions to me.  Patient was admitted for further diagnostic evaluation workup and treatment with diagnoses that included sepsis due to unspecified organism, type a influenza, and urinary tract infection.  Cultures are pending from the urine at the present time and IV antibiotics have been put on hold by the infectious disease attending pending this culture results.    1/7/17.  I personally saw the patient on this date and evaluated him in his room on 4 E.  Nurse reported the patient had been somewhat lethargic earlier in the day.  At the time of my exam however he seemed quite alert and was watching television.  Patient was able to turn on the TV control independently and turn down the sounds without help from me.  Patient denies any shortness of breath cough pain discomfort ingestion or other symptomatology at the present time.  Suggestion of toxic metabolic encephalopathy from the emergency room seems to have resolved.  No further seizure activity seen at the present time by nursing.  Results of cultures still pending.  Patient does have residual right-sided weakness from CVA in the past.  Workup is ongoing for further evaluation of the problems as discussed below.    Review of Systems:    Review of systems could not be obtained due to  patient confusion.    Past Medical History   Diagnosis Date   • Anemia    • Atrial fibrillation    • Atrial fibrillation    • Benign prostatic hyperplasia with lower urinary tract symptoms    •  Cerebral infarct    • Chronic obstructive pyelonephritis    • Constipation    • Depression    • Diabetes mellitus      type 2   • Enlarged prostate    • Hemiplegia and hemiparesis    • Hyperlipidemia    • Hypertension    • Kidney stone    • Seizures    • Sleep apnea    • Stroke      apparent right side residual weakness   • Urinary incontinence      Past Surgical History   Procedure Laterality Date   • Cystoscopy w/ ureteral stent placement N/A 5/3/2016     Procedure: CYSTOSCOPY LASER LITHOTRIPSY LEFT RETROGRADE URETERAL CATHETER AND STENT PLACEMENT;  Surgeon: Eduin Brennan MD;  Location: [facility] OR;  Service:    • Endoscopy w/ peg tube placement N/A 5/13/2016     Procedure: ESOPHAGOGASTRODUODENOSCOPY WITH PERCUTANEOUS ENDOSCOPIC GASTROSTOMY TUBE INSERTION;  Surgeon: Lion Weber MD;  Location: [facility] ENDOSCOPY;  Service:    • Ureteroscopy laser lithotripsy with stent insertion N/A 6/8/2016     Procedure: URETEROSCOPY LASER LITHOTRIPSY STONE EXTRACTION WITH JJ STENT INSERTION;  Surgeon: Eduin Brennan MD;  Location: [facility] OR;  Service:      No Known Allergies  History reviewed. No pertinent family history.  Social History     Social History   • Marital status:      Spouse name: N/A   • Number of children: N/A   • Years of education: N/A     Social History Main Topics   • Smoking status: Never Smoker   • Smokeless tobacco: Never Used      Comment: pt unable to answer questions   • Alcohol use No   • Drug use: No   • Sexual activity: No     Other Topics Concern   • None     Social History Narrative       PMH, FH, SH and ROS completed with Admission History and Physical and updated in EPIC system.  This data is unchanged at this time.       Objective     Scheduled Meds:  docusate sodium 100 mg Oral BID   escitalopram 10 mg Oral Daily   ferrous sulfate 325 mg Oral Daily   levETIRAcetam 500 mg Oral BID   oseltamivir 75 mg Oral Q12H   phenytoin 300 mg Oral Nightly   phenytoin 200 mg Oral  "Daily   sennosides-docusate sodium 2 tablet Oral Nightly   warfarin 3 mg Oral Daily     Continuous Infusions:  sodium chloride 100 mL/hr Last Rate: 100 mL/hr (01/07/17 0657)       Vital signs in last 24 hours:  Temp:  [96.9 °F (36.1 °C)-102.6 °F (39.2 °C)] 98.9 °F (37.2 °C)  Heart Rate:  [] 85  Resp:  [18-20] 20  BP: (109-174)/() 130/77    Intake/Output:    Intake/Output Summary (Last 24 hours) at 01/07/17 2007  Last data filed at 01/07/17 1534   Gross per 24 hour   Intake    150 ml   Output      0 ml   Net    150 ml       Exam:  Visit Vitals   • /77 (BP Location: Right arm, Patient Position: Lying)   • Pulse 85   • Temp 98.9 °F (37.2 °C) (Oral)   • Resp 20   • Ht 64.5\" (163.8 cm)   • Wt 182 lb 8 oz (82.8 kg)   • SpO2 97%   • BMI 30.84 kg/m2                  Constitutional:    Alert, cooperative, no distress, not oriented, resting comfortably                          Head:    Normocephalic, without obvious abnormality, atraumatic                           Eyes:    PERRLA, conjunctiva/corneas clear, no icterus, no conjunctival                                         pallor, EOM's intact, both eyes          ENT and Mouth:   Lips, tongue, gums normal; oral mucosa pink and moist                           Neck:   Supple, symmetrical, trachea midline, no JVD                 Respiratory:   Clear to auscultation bilaterally, respirations unlabored.  No wheezes at the present time.  No rales or rhonchi.           Cardiovascular:    Regular rate and rhythm, S1 and S2 normal, no murmur,       no  Rub or gallop.  Pulses normal.            Gastrointestinal:   BS present x 4 Soft, non-tender, bowel sounds active, no                                                  masses,  no hepatosplenomegaly                              :    No hernia.  Normal exam for sex.                Musculoskeletal:   Extremities normal, atraumatic, no cyanosis or edema.  No                                               arthropathy.  " No deformity.  Gait normal                            Skin:  Skin is warm and dry,  no rashes, swelling or palpable lesions                  Neurologic:   CNII-XII intact, motor strength grossly intact, sensation                                                      grossly intact to light touch, no focal reflexl deficits noted                   Psychiatric:   Alert, oriented x 3, no delusions, psychosis,depression,anxiety       Heme/Lymph/Imun:   No bruises, petechiae.  Lymph nodes normal in size/configuration       Data Review:  Lab Results   Component Value Date    CALCIUM 8.2 (L) 01/07/2017    PHOS 2.2 (L) 01/07/2017     Results from last 7 days  Lab Units 01/07/17  0732 01/07/17  0033   AST (SGOT) U/L  --  16   MAGNESIUM mg/dL 1.2*  --    SODIUM mmol/L 140 142   POTASSIUM mmol/L 3.7 3.9   CHLORIDE mmol/L 105 101   TOTAL CO2 mmol/L 22.1 21.2*   BUN mg/dL 10 11   CREATININE mg/dL 0.75* 0.78   GLUCOSE mg/dL 157* 127*   CALCIUM mg/dL 8.2* 9.3   WBC 10*3/mm3 22.73* 12.65*   HEMOGLOBIN g/dL 12.7* 14.9   PLATELETS 10*3/mm3 196 211   ALT (SGPT) U/L  --  27     Lab Results   Component Value Date    TROPONINT <0.010 04/29/2016     Estimated Creatinine Clearance: 108 mL/min (by C-G formula based on Cr of 0.75).  WEIGHTS:     Wt Readings from Last 1 Encounters:   01/07/17 0500 182 lb 8 oz (82.8 kg)   01/06/17 2354 173 lb 1 oz (78.5 kg)         Attending assessment and plan    1.  Toxic metabolic encephalopathy.  This problems clearly present at the time of the patient's initial admission.  It does seem to be improving at the present time patient is much clearer and back to almost his baseline at the time of my exam.  We will continue to follow this issue up to the time of the patient's discharge.  2.  Influenza A infection.  Patient has been started on oral Tamiflu therapy with 75 mg given twice daily.  Infectious diseases actually evaluating the patient and has ordered him additional testing to prove the diagnosis of flu.   Patient to be monitored for pneumonia and other developing issues.  Chest x-ray is noted to be negative at the present time.  3.  Urine suggestive of possible bacterial infection.  Cultures are pending at present time.  White blood cells count is elevated in the urine.  We will continue to follow culture results and make further decisions about treatment based on patient's progress.  4.  Tachycardia in patient with history of atrial fib and atrial flutter.  No evidence of significant cardiac arrhythmia noticed at the present time.  We will continue to follow this and we'll ask cardiology to take a look at the patient's condition to see if any additional Cardiologic workup is needed.  5.  Benign prostatic hypertrophy.  Stable problem will continue to follow on an outpatient basis  6.  Diabetes mellitus 2.  Will check a hemoglobin A1c.  We'll continue to follow blood sugars while in the hospital and adjust medications to treat appropriately.  7.  Cerebrovascular accident subacute found on scan of right brain.  This may represent an old infarction and actually be nothing acute but in any case will consider treatment as necessary.  8.  BPH.  A shin does have ongoing issues with BPH as part of aging phenomena.  PSA has been negative in the last year.  We will continue to follow the situation area  9.  Cerebrovascular accident with right-sided weakness.  Ongoing problem for patient and family. We will continue to follow.      Plan for disposition:1-2 days based on cultural results      Bogdan Palomares MD  1/7/2017  8:07 PM

## 2017-01-08 NOTE — PROGRESS NOTES
Pharmacy Consult for PO to IV change of Phenytoin and Levetiracetam.    Levetiracetam  Pt on 500 mg PO BID  Levetiracetam IV may be used as an alternative when PO is not feasible, the daily IV dose should be equivalent to the daily dose and frequency of the oral route.  Ordered Levetiracetam 500 mg IV BID           Phenytoin  Patient on 200 mg Q am and 300 mg Q pm.   For short term administration when oral phenytoin is not possible dose for IV is same PE as oral dose.  Ordered Fosphenytoin 200 mg PE IV in morning and 300 mg PE IV in evening.    Thank you for the constults

## 2017-01-09 ENCOUNTER — APPOINTMENT (OUTPATIENT)
Dept: NEUROLOGY | Facility: HOSPITAL | Age: 57
End: 2017-01-09
Attending: INTERNAL MEDICINE

## 2017-01-09 LAB
ANION GAP SERPL CALCULATED.3IONS-SCNC: 17.6 MMOL/L
AORTIC ARCH: 2.8 CM
BACTERIA SPEC AEROBE CULT: ABNORMAL
BASOPHILS # BLD AUTO: 0.03 10*3/MM3 (ref 0–0.2)
BASOPHILS NFR BLD AUTO: 0.3 % (ref 0–1.5)
BH CV ECHO MEAS - ACS: 1.8 CM
BH CV ECHO MEAS - AO MEAN PG (FULL): 0 MMHG
BH CV ECHO MEAS - AO MEAN PG: 2 MMHG
BH CV ECHO MEAS - AO ROOT AREA (BSA CORRECTED): 1.8
BH CV ECHO MEAS - AO ROOT AREA: 9.6 CM^2
BH CV ECHO MEAS - AO ROOT DIAM: 3.5 CM
BH CV ECHO MEAS - AO V2 MAX: 96 CM/SEC
BH CV ECHO MEAS - AO V2 MEAN: 62.5 CM/SEC
BH CV ECHO MEAS - AO V2 VTI: 17.9 CM
BH CV ECHO MEAS - AVA(I,A): 3.1 CM^2
BH CV ECHO MEAS - AVA(I,D): 3.1 CM^2
BH CV ECHO MEAS - BSA(HAYCOCK): 2 M^2
BH CV ECHO MEAS - BSA: 1.9 M^2
BH CV ECHO MEAS - BZI_BMI: 30.3 KILOGRAMS/M^2
BH CV ECHO MEAS - BZI_METRIC_HEIGHT: 165.1 CM
BH CV ECHO MEAS - BZI_METRIC_WEIGHT: 82.6 KG
BH CV ECHO MEAS - CONTRAST EF (2CH): 58.8 ML/M^2
BH CV ECHO MEAS - CONTRAST EF 4CH: 59.4 ML/M^2
BH CV ECHO MEAS - EDV(CUBED): 64 ML
BH CV ECHO MEAS - EDV(MOD-SP2): 68 ML
BH CV ECHO MEAS - EDV(MOD-SP4): 64 ML
BH CV ECHO MEAS - EDV(TEICH): 70 ML
BH CV ECHO MEAS - EF(CUBED): 57.8 %
BH CV ECHO MEAS - EF(MOD-SP2): 58.8 %
BH CV ECHO MEAS - EF(MOD-SP4): 59.4 %
BH CV ECHO MEAS - EF(TEICH): 50 %
BH CV ECHO MEAS - ESV(CUBED): 27 ML
BH CV ECHO MEAS - ESV(MOD-SP2): 28 ML
BH CV ECHO MEAS - ESV(MOD-SP4): 26 ML
BH CV ECHO MEAS - ESV(TEICH): 35 ML
BH CV ECHO MEAS - FS: 25 %
BH CV ECHO MEAS - IVS/LVPW: 1.1
BH CV ECHO MEAS - IVSD: 1 CM
BH CV ECHO MEAS - LAT PEAK E' VEL: 11 CM/SEC
BH CV ECHO MEAS - LV DIASTOLIC VOL/BSA (35-75): 33.7 ML/M^2
BH CV ECHO MEAS - LV MASS(C)D: 118.2 GRAMS
BH CV ECHO MEAS - LV MASS(C)DI: 62.2 GRAMS/M^2
BH CV ECHO MEAS - LV MEAN PG: 2 MMHG
BH CV ECHO MEAS - LV SYSTOLIC VOL/BSA (12-30): 13.7 ML/M^2
BH CV ECHO MEAS - LV V1 MAX: 91 CM/SEC
BH CV ECHO MEAS - LV V1 MEAN: 57.6 CM/SEC
BH CV ECHO MEAS - LV V1 VTI: 16 CM
BH CV ECHO MEAS - LVIDD: 4 CM
BH CV ECHO MEAS - LVIDS: 3 CM
BH CV ECHO MEAS - LVLD AP2: 7.5 CM
BH CV ECHO MEAS - LVLD AP4: 7.3 CM
BH CV ECHO MEAS - LVLS AP2: 6.8 CM
BH CV ECHO MEAS - LVLS AP4: 7.2 CM
BH CV ECHO MEAS - LVOT AREA (M): 3.5 CM^2
BH CV ECHO MEAS - LVOT AREA: 3.5 CM^2
BH CV ECHO MEAS - LVOT DIAM: 2.1 CM
BH CV ECHO MEAS - LVPWD: 0.9 CM
BH CV ECHO MEAS - MED PEAK E' VEL: 7 CM/SEC
BH CV ECHO MEAS - MV A DUR: 0.13 SEC
BH CV ECHO MEAS - MV A MAX VEL: 102 CM/SEC
BH CV ECHO MEAS - MV DEC SLOPE: 376 CM/SEC^2
BH CV ECHO MEAS - MV DEC TIME: 0.18 SEC
BH CV ECHO MEAS - MV E MAX VEL: 84.9 CM/SEC
BH CV ECHO MEAS - MV E/A: 0.83
BH CV ECHO MEAS - MV MAX PG: 4 MMHG
BH CV ECHO MEAS - MV MEAN PG: 1 MMHG
BH CV ECHO MEAS - MV P1/2T MAX VEL: 79.5 CM/SEC
BH CV ECHO MEAS - MV P1/2T: 61.9 MSEC
BH CV ECHO MEAS - MV V2 MEAN: 55.1 CM/SEC
BH CV ECHO MEAS - MV V2 VTI: 29.5 CM
BH CV ECHO MEAS - MVA P1/2T LCG: 2.8 CM^2
BH CV ECHO MEAS - MVA(P1/2T): 3.6 CM^2
BH CV ECHO MEAS - MVA(VTI): 1.9 CM^2
BH CV ECHO MEAS - PA ACC SLOPE: 15.4 CM/SEC^2
BH CV ECHO MEAS - PA ACC TIME: 0.07 SEC
BH CV ECHO MEAS - PA MAX PG (FULL): 1.7 MMHG
BH CV ECHO MEAS - PA MAX PG: 3.6 MMHG
BH CV ECHO MEAS - PA PR(ACCEL): 47.5 MMHG
BH CV ECHO MEAS - PA V2 MAX: 94.5 CM/SEC
BH CV ECHO MEAS - PULM A REVS DUR: 0.09 SEC
BH CV ECHO MEAS - PULM A REVS VEL: 34.2 CM/SEC
BH CV ECHO MEAS - PULM DIAS VEL: 39 CM/SEC
BH CV ECHO MEAS - PULM S/D: 0.93
BH CV ECHO MEAS - PULM SYS VEL: 36.4 CM/SEC
BH CV ECHO MEAS - PVA(V,A): 1.7 CM^2
BH CV ECHO MEAS - PVA(V,D): 1.7 CM^2
BH CV ECHO MEAS - QP/QS: 0.58
BH CV ECHO MEAS - RV MAX PG: 1.9 MMHG
BH CV ECHO MEAS - RV MEAN PG: 1 MMHG
BH CV ECHO MEAS - RV V1 MAX: 69.2 CM/SEC
BH CV ECHO MEAS - RV V1 MEAN: 47.1 CM/SEC
BH CV ECHO MEAS - RV V1 VTI: 14.2 CM
BH CV ECHO MEAS - RVOT AREA: 2.3 CM^2
BH CV ECHO MEAS - RVOT DIAM: 1.7 CM
BH CV ECHO MEAS - SI(AO): 90.6 ML/M^2
BH CV ECHO MEAS - SI(CUBED): 19.5 ML/M^2
BH CV ECHO MEAS - SI(LVOT): 29.2 ML/M^2
BH CV ECHO MEAS - SI(MOD-SP2): 21 ML/M^2
BH CV ECHO MEAS - SI(MOD-SP4): 20 ML/M^2
BH CV ECHO MEAS - SI(TEICH): 18.4 ML/M^2
BH CV ECHO MEAS - SV(AO): 172.2 ML
BH CV ECHO MEAS - SV(CUBED): 37 ML
BH CV ECHO MEAS - SV(LVOT): 55.4 ML
BH CV ECHO MEAS - SV(MOD-SP2): 40 ML
BH CV ECHO MEAS - SV(MOD-SP4): 38 ML
BH CV ECHO MEAS - SV(RVOT): 32.2 ML
BH CV ECHO MEAS - SV(TEICH): 35 ML
BH CV ECHO MEAS - TAPSE (>1.6): 2 CM2
BH CV XLRA - RV BASE: 2.9 CM
BH CV XLRA - TDI S': 18 CM/SEC
BUN BLD-MCNC: 6 MG/DL (ref 6–20)
BUN/CREAT SERPL: 9.1 (ref 7–25)
CALCIUM SPEC-SCNC: 8.7 MG/DL (ref 8.6–10.5)
CHLORIDE SERPL-SCNC: 101 MMOL/L (ref 98–107)
CO2 SERPL-SCNC: 20.4 MMOL/L (ref 22–29)
CREAT BLD-MCNC: 0.66 MG/DL (ref 0.76–1.27)
DEPRECATED RDW RBC AUTO: 45.2 FL (ref 37–54)
E/E' RATIO: 10
EOSINOPHIL # BLD AUTO: 0.21 10*3/MM3 (ref 0–0.7)
EOSINOPHIL NFR BLD AUTO: 2.3 % (ref 0.3–6.2)
ERYTHROCYTE [DISTWIDTH] IN BLOOD BY AUTOMATED COUNT: 14.7 % (ref 11.5–14.5)
GFR SERPL CREATININE-BSD FRML MDRD: 125 ML/MIN/1.73
GFR SERPL CREATININE-BSD FRML MDRD: >150 ML/MIN/1.73
GLUCOSE BLD-MCNC: 73 MG/DL (ref 65–99)
GLUCOSE BLDC GLUCOMTR-MCNC: 126 MG/DL (ref 70–130)
GRAM STN SPEC: ABNORMAL
HCT VFR BLD AUTO: 40.2 % (ref 40.4–52.2)
HGB BLD-MCNC: 12.4 G/DL (ref 13.7–17.6)
IMM GRANULOCYTES # BLD: 0.02 10*3/MM3 (ref 0–0.03)
IMM GRANULOCYTES NFR BLD: 0.2 % (ref 0–0.5)
INR PPP: 1.9 (ref 0.9–1.1)
LEFT ATRIUM VOLUME INDEX: 19 ML/M2
LYMPHOCYTES # BLD AUTO: 2.43 10*3/MM3 (ref 0.9–4.8)
LYMPHOCYTES NFR BLD AUTO: 26.8 % (ref 19.6–45.3)
MAGNESIUM SERPL-MCNC: 1.7 MG/DL (ref 1.6–2.6)
MCH RBC QN AUTO: 25.9 PG (ref 27–32.7)
MCHC RBC AUTO-ENTMCNC: 30.8 G/DL (ref 32.6–36.4)
MCV RBC AUTO: 83.9 FL (ref 79.8–96.2)
MONOCYTES # BLD AUTO: 0.72 10*3/MM3 (ref 0.2–1.2)
MONOCYTES NFR BLD AUTO: 7.9 % (ref 5–12)
NEUTROPHILS # BLD AUTO: 5.65 10*3/MM3 (ref 1.9–8.1)
NEUTROPHILS NFR BLD AUTO: 62.5 % (ref 42.7–76)
NT-PROBNP SERPL-MCNC: 947.4 PG/ML (ref 5–900)
PLATELET # BLD AUTO: 189 10*3/MM3 (ref 140–500)
PMV BLD AUTO: 10.1 FL (ref 6–12)
POTASSIUM BLD-SCNC: 3.4 MMOL/L (ref 3.5–5.2)
PROTHROMBIN TIME: 21.1 SECONDS (ref 11.7–14.2)
RBC # BLD AUTO: 4.79 10*6/MM3 (ref 4.6–6)
SODIUM BLD-SCNC: 139 MMOL/L (ref 136–145)
WBC NRBC COR # BLD: 9.06 10*3/MM3 (ref 4.5–10.7)

## 2017-01-09 PROCEDURE — 92610 EVALUATE SWALLOWING FUNCTION: CPT

## 2017-01-09 PROCEDURE — 95816 EEG AWAKE AND DROWSY: CPT

## 2017-01-09 PROCEDURE — 25010000002 PIPERACILLIN SOD-TAZOBACTAM PER 1 G: Performed by: INTERNAL MEDICINE

## 2017-01-09 PROCEDURE — 93010 ELECTROCARDIOGRAM REPORT: CPT | Performed by: INTERNAL MEDICINE

## 2017-01-09 PROCEDURE — 83735 ASSAY OF MAGNESIUM: CPT | Performed by: NURSE PRACTITIONER

## 2017-01-09 PROCEDURE — 99232 SBSQ HOSP IP/OBS MODERATE 35: CPT | Performed by: INTERNAL MEDICINE

## 2017-01-09 PROCEDURE — 99231 SBSQ HOSP IP/OBS SF/LOW 25: CPT | Performed by: PSYCHIATRY & NEUROLOGY

## 2017-01-09 PROCEDURE — 25010000002 LEVETIRACETAM IN NACL 0.82% 500 MG/100ML SOLUTION: Performed by: INTERNAL MEDICINE

## 2017-01-09 PROCEDURE — 95819 EEG AWAKE AND ASLEEP: CPT | Performed by: PSYCHIATRY & NEUROLOGY

## 2017-01-09 PROCEDURE — 85025 COMPLETE CBC W/AUTO DIFF WBC: CPT | Performed by: INTERNAL MEDICINE

## 2017-01-09 PROCEDURE — 25010000002 HYDRALAZINE PER 20 MG: Performed by: NURSE PRACTITIONER

## 2017-01-09 PROCEDURE — 25010000002 ENOXAPARIN PER 10 MG: Performed by: NURSE PRACTITIONER

## 2017-01-09 PROCEDURE — 85610 PROTHROMBIN TIME: CPT | Performed by: INTERNAL MEDICINE

## 2017-01-09 PROCEDURE — 83880 ASSAY OF NATRIURETIC PEPTIDE: CPT | Performed by: NURSE PRACTITIONER

## 2017-01-09 PROCEDURE — 80048 BASIC METABOLIC PNL TOTAL CA: CPT | Performed by: INTERNAL MEDICINE

## 2017-01-09 PROCEDURE — 99232 SBSQ HOSP IP/OBS MODERATE 35: CPT | Performed by: FAMILY MEDICINE

## 2017-01-09 PROCEDURE — 93005 ELECTROCARDIOGRAM TRACING: CPT | Performed by: NURSE PRACTITIONER

## 2017-01-09 PROCEDURE — 82962 GLUCOSE BLOOD TEST: CPT

## 2017-01-09 RX ORDER — HYDRALAZINE HYDROCHLORIDE 20 MG/ML
20 INJECTION INTRAMUSCULAR; INTRAVENOUS EVERY 6 HOURS PRN
Status: DISCONTINUED | OUTPATIENT
Start: 2017-01-09 | End: 2017-01-13 | Stop reason: HOSPADM

## 2017-01-09 RX ORDER — SACCHAROMYCES BOULARDII 250 MG
250 CAPSULE ORAL 2 TIMES DAILY
Status: DISCONTINUED | OUTPATIENT
Start: 2017-01-09 | End: 2017-01-13 | Stop reason: HOSPADM

## 2017-01-09 RX ADMIN — METOPROLOL TARTRATE 2.5 MG: 5 INJECTION INTRAVENOUS at 11:07

## 2017-01-09 RX ADMIN — OSELTAMIVIR PHOSPHATE 75 MG: 75 CAPSULE ORAL at 15:48

## 2017-01-09 RX ADMIN — DOCUSATE SODIUM -SENNOSIDES 2 TABLET: 50; 8.6 TABLET, COATED ORAL at 21:12

## 2017-01-09 RX ADMIN — TAZOBACTAM SODIUM AND PIPERACILLIN SODIUM 3.38 G: 375; 3 INJECTION, SOLUTION INTRAVENOUS at 10:21

## 2017-01-09 RX ADMIN — SODIUM CHLORIDE 100 ML/HR: 4.5 INJECTION, SOLUTION INTRAVENOUS at 18:02

## 2017-01-09 RX ADMIN — LEVETIRACETAM 500 MG: 5 INJECTION INTRAVENOUS at 10:22

## 2017-01-09 RX ADMIN — TAZOBACTAM SODIUM AND PIPERACILLIN SODIUM 3.38 G: 375; 3 INJECTION, SOLUTION INTRAVENOUS at 00:59

## 2017-01-09 RX ADMIN — ENOXAPARIN SODIUM 80 MG: 80 INJECTION SUBCUTANEOUS at 21:37

## 2017-01-09 RX ADMIN — OSELTAMIVIR PHOSPHATE 75 MG: 75 CAPSULE ORAL at 21:12

## 2017-01-09 RX ADMIN — ESCITALOPRAM 10 MG: 10 TABLET, FILM COATED ORAL at 15:46

## 2017-01-09 RX ADMIN — METOPROLOL TARTRATE 2.5 MG: 5 INJECTION INTRAVENOUS at 21:38

## 2017-01-09 RX ADMIN — WARFARIN SODIUM 3 MG: 3 TABLET ORAL at 18:03

## 2017-01-09 RX ADMIN — SODIUM CHLORIDE 150 MG PE: 9 INJECTION, SOLUTION INTRAVENOUS at 18:02

## 2017-01-09 RX ADMIN — DOCUSATE SODIUM 100 MG: 100 CAPSULE, LIQUID FILLED ORAL at 15:48

## 2017-01-09 RX ADMIN — SODIUM CHLORIDE 150 MG PE: 9 INJECTION, SOLUTION INTRAVENOUS at 07:52

## 2017-01-09 RX ADMIN — LEVETIRACETAM 500 MG: 5 INJECTION INTRAVENOUS at 21:11

## 2017-01-09 RX ADMIN — MINERAL SUPPLEMENT IRON 300 MG / 5 ML STRENGTH LIQUID 100 PER BOX UNFLAVORED 300 MG: at 15:45

## 2017-01-09 RX ADMIN — SODIUM CHLORIDE 150 MG PE: 9 INJECTION, SOLUTION INTRAVENOUS at 00:58

## 2017-01-09 RX ADMIN — HYDRALAZINE HYDROCHLORIDE 20 MG: 20 INJECTION INTRAMUSCULAR; INTRAVENOUS at 12:45

## 2017-01-09 RX ADMIN — TAZOBACTAM SODIUM AND PIPERACILLIN SODIUM 3.38 G: 375; 3 INJECTION, SOLUTION INTRAVENOUS at 18:03

## 2017-01-09 RX ADMIN — ENOXAPARIN SODIUM 80 MG: 80 INJECTION SUBCUTANEOUS at 12:31

## 2017-01-09 RX ADMIN — Medication 250 MG: at 15:52

## 2017-01-09 RX ADMIN — TAZOBACTAM SODIUM AND PIPERACILLIN SODIUM 3.38 G: 375; 3 INJECTION, SOLUTION INTRAVENOUS at 23:30

## 2017-01-09 RX ADMIN — Medication 250 MG: at 21:12

## 2017-01-09 NOTE — PROGRESS NOTES
[facility]  Cardiology Progress Note    Patient Identification:  Name: Servando Kapadia  Age: 56 y.o.  Sex: male  :  1960  MRN: [id number]                 Follow Up / Chief Complaint: Paroxysmal A. fib, anticoagulation    Interval History:  56-year-old male with history of paroxysmal A. fib on chronic anticoagulation who presented from nursing facility following a seizure with fever of 102, uncontrolled hypertension, influenza A, UTI and possible bacteremia. He had a brief episode of paroxysmal A. fib with RVR last evening in the setting of magnesium level I.2.      Subjective:  Awake, more alert.  Speech remains somewhat difficult to understand at times.  Denies chest pain, shortness of breath    Objective:  No further arrhythmia or ectopy.  Blood pressure quite labile  Troponin negative.  Magnesium stable at 1.7.  Patient failed swallow study.  Remains nothing by mouth.    Past Medical History:  Past Medical History   Diagnosis Date   • Anemia    • Atrial fibrillation    • Atrial fibrillation    • Benign prostatic hyperplasia with lower urinary tract symptoms    • Cerebral infarct    • Chronic obstructive pyelonephritis    • Constipation    • Depression    • Diabetes mellitus      type 2   • Enlarged prostate    • Hemiplegia and hemiparesis    • Hyperlipidemia    • Hypertension    • Kidney stone    • Seizures    • Sleep apnea    • Stroke      apparent right side residual weakness   • Urinary incontinence      Past Surgical History:  Past Surgical History   Procedure Laterality Date   • Cystoscopy w/ ureteral stent placement N/A 5/3/2016     Procedure: CYSTOSCOPY LASER LITHOTRIPSY LEFT RETROGRADE URETERAL CATHETER AND STENT PLACEMENT;  Surgeon: Eduin Brennan MD;  Location: [facility];  Service:    • Endoscopy w/ peg tube placement N/A 2016     Procedure: ESOPHAGOGASTRODUODENOSCOPY WITH PERCUTANEOUS ENDOSCOPIC GASTROSTOMY TUBE INSERTION;  Surgeon: Lion Weber MD;  Location:  [facility] ENDOSCOPY;  Service:    • Ureteroscopy laser lithotripsy with stent insertion N/A 6/8/2016     Procedure: URETEROSCOPY LASER LITHOTRIPSY STONE EXTRACTION WITH JJ STENT INSERTION;  Surgeon: Eduin Brennan MD;  Location: [facility] OR;  Service:         Social History:   Social History   Substance Use Topics   • Smoking status: Never Smoker   • Smokeless tobacco: Never Used      Comment: pt unable to answer questions   • Alcohol use No      Family History:  History reviewed. No pertinent family history.       Allergies:  No Known Allergies  Scheduled Meds:    docusate sodium 100 mg BID   escitalopram 10 mg Daily   ferrous sulfate 325 mg Daily   fosphenytoin 150 mg PE Q8H   levETIRAcetam 500 mg Q12H   metoprolol 2.5 mg Q12H   oseltamivir 75 mg Q12H   piperacillin-tazobactam 3.375 g Q8H   saccharomyces boulardii 250 mg BID   sennosides-docusate sodium 2 tablet Nightly   warfarin 3 mg Daily           INTAKE AND OUTPUT:    Intake/Output Summary (Last 24 hours) at 01/09/17 0938  Last data filed at 01/08/17 1751   Gross per 24 hour   Intake      0 ml   Output      0 ml   Net      0 ml       Review of Systems:   GI:  Failed swallow study, remains nothing by mouth today  Cardiac:  No further ectopy  Pulmonary:  No increased work of breathing    Constitutional:  Temp:  [97.8 °F (36.6 °C)-98.9 °F (37.2 °C)] 98.5 °F (36.9 °C)  Heart Rate:  [69-78] 69  Resp:  [18-20] 18  BP: (135-146)/() 138/102    Physical Exam by Jairo Crum MD  General:  Awake, more alert today.  Appears in no acute distress  Eyes: PRTL,  HEENT:  Thyroid not visibly enlarged. No mucosal  cyanosis  Respiratory: Respirations regular and unlabored at rest.  Slightly diminished air entry in bases.  No crackles or wheezes auscultated  Cardiovascular: S1S2 Regular rate and rhythm. No murmur. No pretibial pitting edema with TEDs  Gastrointestinal: Abdomen soft, non tender. Bowel sounds present.   Musculoskeletal: CHRISTIANSON x4. No abnormal  movements  Extremities: No digital cyanosis  Skin: Skin warm and dry to touch.   Neuro: AAO x1-2   Psych: pleasant and cooperative          Cardiographics  Telemetry: SR 60's-70's, no ectopy        ECG 1-9-2017:       Echocardiogram:   · TDS , SUBOPTIMAL STUDY  · All left ventricular wall segments contract normally.  · Left Ventricle: Calculated EF = 59.4%  · There is no evidence of pericardial effusion.  · BUBBLE STUDY NEG FOR PFO    Results from last 7 days  Lab Units 01/08/17  1817 01/08/17  0918   TROPONIN T ng/mL <0.010 <0.010       Results from last 7 days  Lab Units 01/09/17  0537   MAGNESIUM mg/dL 1.7       Results from last 7 days  Lab Units 01/09/17  0537   SODIUM mmol/L 139   POTASSIUM mmol/L 3.4*   BUN mg/dL 6   CREATININE mg/dL 0.66*   CALCIUM mg/dL 8.7       Results from last 7 days  Lab Units 01/09/17  0537 01/08/17  0327 01/07/17  0732   WBC 10*3/mm3 9.06 12.18* 22.73*   HEMOGLOBIN g/dL 12.4* 12.3* 12.7*   HEMATOCRIT % 40.2* 40.3* 39.7*   PLATELETS 10*3/mm3 189 180 196       Results from last 7 days  Lab Units 01/09/17  0537 01/08/17  0327 01/07/17  0732   INR  1.90* 2.06* 2.46*         Assessment:  - PAF rvr    - s/p Hypomagnesia  - h/o PAF  - elevated [id number] score -> on Coumadin for INR therapeutic  - HTN  - Dyslipidemia  - Seizure  - Febrile illness  - Influenza A  - h/o CVA  - h/o seizure disorder       Plan:  - PAF rvr . No recurrence.  No MI  TSH normal.  Magnesium replaced per protocol. Continue beta blocker    - s/p Hypomagnesia - magnesium level stable at I.7 after replacement per protocol    - h/o PAF - elevated [id number] score -> Coumadin on hold while patient NPO.  INR 1.9 today.  Will start on full dose Lovenox pending repeat swallow study.  Then, resume Coumadin and dose to keep INR 2.0-3.0 range    - HTN - uncontrolled.  Add IV hydralazine when necessary to IV beta blocker while patient NPO    Patient failed swallow study yesterday.  He remains nothing by mouth.  Will start on  full dose Lovenox bridge therapy, then resume Coumadin once diet ordered.  Add IV hydralazine for blood pressure control    I reviewed the patient's new clinical results and treatment plan with Dr Crum. I personally viewed and interpreted the patient's EKG/Telemetry data    )1/9/2017  MD YANCI Nixon Dragon/Transcription disclaimer:   Much of this encounter note is an electronic transcription/translation of spoken language to printed text. The electronic translation of spoken language may permit erroneous, or at times, nonsensical words or phrases to be inadvertently transcribed; Although I have reviewed the note for such errors, some may still exist.

## 2017-01-09 NOTE — PROGRESS NOTES
[facility]  FAMILY AND INTERNAL MEDICINE  ELISEO COYLE, and JACQUI      INTERNAL MEDICINE DAILY PROGRESS NOTE  Bogdan Palomares M.D.  2017            Patient Identification:  Name: Servando Kapadia  Age: 56 y.o.  Sex: male  :  1960  MRN: [id number]         Primary Care Physician: Dylan Cuba MD  LENGTH OF STAY 1 DAYS    Consults     Date and Time Order Name Status Description    2017 Inpatient Consult to Neurology Completed     2017 Inpatient Consult to Cardiology Completed     2017 Inpatient Consult to Urology Completed     2017 0637 Inpatient Consult to Infectious Diseases Completed     2017 0155 Family Medicine Consult Completed            Chief Complaint: Fever with seizure-like activity        Subjective        Interval History: Patient is a 56 y.o.male who presented with fever to 102.6 and seizure-like activity in the [facility] ER. Patient is a regular resident at [facility] nursing [facility] where he is followed by my partner Dr. Dylan Cuba. Nursing staff apparently became very concerned last night and called 911 to send patient to hospital when they discovered that he was having seizure-like activity. This was done before contacting the on-call attending, myself. In the emergency room patient was found to have some residuals jerking but no evidence of active seizures it is noted that the patient had a stroke approximately 14 years ago and has had seizure activity since the time of that stroke.     Workup in the emergency room was done under the direction of Fran Schrader M.D. by physician assistant Toñito Pena. On presentation patient was positive for fever to 102.6., Seizure-like activity, elevated heart rate of 140, elevated blood pressure of 174/113. Physical exam was not very revealing but did show some existing right lower extremity weakness from a previous stroke. Labs that  were collected showed an elevated glucose of 127, normal liver and kidney function, INR of 2.26, Jamel 20 level slightly low at 7.2, WBCs 12.65, flu swab the patient's nose came back positive for influenza A. Patient was discussed with myself and a repeat temperature at the time that discussion was 99.7 it was felt that the patient had sepsis due to an unspecified organism with type a influenza activity that seem to be present and accounting for his temperature elevations. There was also suggestion of a possible urinary tract infection. Lactate level was 3.3, chest x-ray was negative. Family discussed situation with the ER attending reported those discussions to me. Patient was admitted for further diagnostic evaluation workup and treatment with diagnoses that included sepsis due to unspecified organism, type a influenza, and urinary tract infection. Cultures are pending from the urine at the present time and IV antibiotics have been put on hold by the infectious disease attending pending this culture results.     1/7/17. I personally saw the patient on this date and evaluated him in his room on 4 E. Nurse reported the patient had been somewhat lethargic earlier in the day. At the time of my exam however he seemed quite alert and was watching television. Patient was able to turn on the TV control independently and turn down the sounds without help from me. Patient denies any shortness of breath cough pain discomfort ingestion or other symptomatology at the present time. Suggestion of toxic metabolic encephalopathy from the emergency room seems to have resolved. No further seizure activity seen at the present time by nursing. Results of cultures still pending. Patient does have residual right-sided weakness from CVA in the past. Workup is ongoing for further evaluation of the problems as discussed below.    1/8/17.  Patient remains stable today during my visit.  He is resting more comfortably and sleeping soundly.   I had a lengthy discussion about the case with the patient's nurse.  She does feel he is overly somnolent at times but is gradually improving.  Patient did fail bedside swallowing effort again today.  Speech therapy to see patient tomorrow and do formal swallowing studies.  Antibiotics have been continued by infectious disease.  Patient had 2 blood cultures positive for different organisms and also a urine culture positive.  Patient is having some episodes of desaturation and an overnight pulse oximetry has been ordered.       Review of Systems:    Review of systems could not be obtained due to  patient sedation status.    Past Medical History   Diagnosis Date   • Anemia    • Atrial fibrillation    • Atrial fibrillation    • Benign prostatic hyperplasia with lower urinary tract symptoms    • Cerebral infarct    • Chronic obstructive pyelonephritis    • Constipation    • Depression    • Diabetes mellitus      type 2   • Enlarged prostate    • Hemiplegia and hemiparesis    • Hyperlipidemia    • Hypertension    • Kidney stone    • Seizures    • Sleep apnea    • Stroke      apparent right side residual weakness   • Urinary incontinence      Past Surgical History   Procedure Laterality Date   • Cystoscopy w/ ureteral stent placement N/A 5/3/2016     Procedure: CYSTOSCOPY LASER LITHOTRIPSY LEFT RETROGRADE URETERAL CATHETER AND STENT PLACEMENT;  Surgeon: Eduin Brennan MD;  Location: [facility];  Service:    • Endoscopy w/ peg tube placement N/A 5/13/2016     Procedure: ESOPHAGOGASTRODUODENOSCOPY WITH PERCUTANEOUS ENDOSCOPIC GASTROSTOMY TUBE INSERTION;  Surgeon: Lion Weber MD;  Location: [facility] ENDOSCOPY;  Service:    • Ureteroscopy laser lithotripsy with stent insertion N/A 6/8/2016     Procedure: URETEROSCOPY LASER LITHOTRIPSY STONE EXTRACTION WITH JJ STENT INSERTION;  Surgeon: Eduin Brennan MD;  Location: [facility] OR;  Service:      No Known Allergies  History reviewed. No pertinent family  "history.  Social History     Social History   • Marital status:      Spouse name: N/A   • Number of children: N/A   • Years of education: N/A     Social History Main Topics   • Smoking status: Never Smoker   • Smokeless tobacco: Never Used      Comment: pt unable to answer questions   • Alcohol use No   • Drug use: No   • Sexual activity: No     Other Topics Concern   • None     Social History Narrative       PMH, FH, SH and ROS completed with Admission History and Physical and updated in EPIC system.  This data is unchanged at this time.       Objective     Scheduled Meds:  docusate sodium 100 mg Oral BID   escitalopram 10 mg Oral Daily   ferrous sulfate 325 mg Oral Daily   fosphenytoin 150 mg PE Intravenous Q8H   levETIRAcetam 500 mg Intravenous Q12H   metoprolol 2.5 mg Intravenous Q12H   oseltamivir 75 mg Oral Q12H   piperacillin-tazobactam 3.375 g Intravenous Q8H   sennosides-docusate sodium 2 tablet Oral Nightly   warfarin 3 mg Oral Daily     Continuous Infusions:  Pharmacy Consult - Pharmacy to dose     Pharmacy Consult - Pharmacy to dose     sodium chloride 100 mL/hr Last Rate: 100 mL/hr (01/08/17 2011)       Vital signs in last 24 hours:  Temp:  [97.8 °F (36.6 °C)-98.9 °F (37.2 °C)] 97.8 °F (36.6 °C)  Heart Rate:  [70-99] 70  Resp:  [16-20] 20  BP: (137-146)/(83-99) 140/83    Intake/Output:    Intake/Output Summary (Last 24 hours) at 01/08/17 2021  Last data filed at 01/08/17 1751   Gross per 24 hour   Intake      0 ml   Output      0 ml   Net      0 ml       Exam:  Visit Vitals   • /83 (BP Location: Right arm, Patient Position: Lying)   • Pulse 70   • Temp 97.8 °F (36.6 °C) (Oral)   • Resp 20   • Ht 64.5\" (163.8 cm)   • Wt 182 lb 8 oz (82.8 kg)   • SpO2 100%   • BMI 30.84 kg/m2                  Constitutional:    Alert, cooperative, no distress, resting comfortably                          Head:    Normocephalic, without obvious abnormality, atraumatic                           Eyes:    [place] " conjunctiva/corneas clear, no icterus, no conjunctival                                         pallor, EOM's intact, both eyes          ENT and Mouth:   Lips, tongue, gums normal; oral mucosa pink and moist                           Neck:   Supple, symmetrical, trachea midline, no JVD                 Respiratory:   Clear to auscultation bilaterally, respirations unlabored           Cardiovascular:    Regular rate and rhythm, S1 and S2 normal, no murmur,       no  Rub or gallop.  Pulses normal.            Gastrointestinal:   BS present x 4 Soft, non-tender, bowel sounds active, no                                                  masses,  no hepatosplenomegaly                              :    No hernia.  Normal exam for sex.                Musculoskeletal:   Extremities normal, atraumatic, no cyanosis or edema.  No                                               arthropathy.  No deformity.  Gait normal                            Skin:  Skin is warm and dry,  no rashes, swelling or palpable lesions                  Neurologic:   CNII-XII intact, motor strength grossly intact, sensation                                                      grossly intact to light touch, no focal reflexl deficits noted  right sided weakness persist.                 Psychiatric:   A no delusions, psychosis,depression,anxiety       Heme/Lymph/Imun:   No bruises, petechiae.  Lymph nodes normal in size/configuration       Data Review:  Lab Results   Component Value Date    CALCIUM 8.6 01/08/2017    PHOS 2.2 (L) 01/07/2017     Results from last 7 days  Lab Units 01/08/17  0327 01/07/17  2213 01/07/17  0732 01/07/17  0033   AST (SGOT) U/L  --   --   --  16   MAGNESIUM mg/dL  --  1.7 1.2*  --    SODIUM mmol/L 139  --  140 142   POTASSIUM mmol/L 3.2*  --  3.7 3.9   CHLORIDE mmol/L 104  --  105 101   TOTAL CO2 mmol/L 23.3  --  22.1 21.2*   BUN mg/dL 7  --  10 11   CREATININE mg/dL 0.71*  --  0.75* 0.78   GLUCOSE mg/dL 96  --  157* 127*   CALCIUM  mg/dL 8.6  --  8.2* 9.3   WBC 10*3/mm3 12.18*  --  22.73* 12.65*   HEMOGLOBIN g/dL 12.3*  --  12.7* 14.9   PLATELETS 10*3/mm3 180  --  196 211   ALT (SGPT) U/L  --   --   --  27     Lab Results   Component Value Date    TROPONINT <0.010 01/08/2017     Estimated Creatinine Clearance: 114 mL/min (by C-G formula based on Cr of 0.71).  WEIGHTS:     Wt Readings from Last 1 Encounters:   01/07/17 0500 182 lb 8 oz (82.8 kg)   01/06/17 2354 173 lb 1 oz (78.5 kg)       Attending assessment and plan     1. Toxic metabolic encephalopathy. This problems clearly present at the time of the patient's initial admission. It does seem to be improving at the present time patient is much clearer and back to almost his baseline at the time of my exam. We will continue to follow this issue up to the time of the patient's discharge.  Symptoms are gradually improving  2. Influenza A infection. Patient has been started on oral Tamiflu therapy with 75 mg given twice daily. Infectious diseases actually evaluating the patient and has ordered him additional testing to prove the diagnosis of flu. Patient to be monitored for pneumonia and other developing issues. Chest x-ray is noted to be negative at the present time.  Continues on Tamiflu at this point  3. Urine suggestive of possible bacterial infection. Cultures are pending at present time. White blood cells count is elevated in the urine. We will continue to follow culture results and make further decisions about treatment based on patient's progress.  Resume Zosyn therapy due to positive blood and urine cultures pending final results  4. Tachycardia in patient with history of atrial fib and atrial flutter. No evidence of significant cardiac arrhythmia noticed at the present time. We will continue to follow this and we'll ask cardiology to take a look at the patient's condition to see if any additional Cardiologic workup is needed.  Tachycardia seems stable at present time  5. Benign  prostatic hypertrophy.  Stable problem will continue to follow on an outpatient basis  6. Diabetes mellitus 2.  Will check a hemoglobin A1c. We'll continue to follow blood sugars while in the hospital and adjust medications to treat appropriately.  7. Cerebrovascular accident subacute found on scan of right brain.  This may represent an old infarction and actually be nothing acute but in any case will consider treatment as necessary.  8. BPH. A shin does have ongoing issues with BPH as part of aging phenomena. PSA has been negative in the last year. We will continue to follow the situation area  9. Cerebrovascular accident with right-sided weakness.  Ongoing problem for patient and family. We will continue to follow.            Plan for disposition:Where: SNF and When:  stable      Bogdan Palomares MD  1/8/2017  8:21 PM

## 2017-01-09 NOTE — PROGRESS NOTES
Discharge Planning Assessment  [facility]     Patient Name: Servando Kapadia  MRN: [id number]  Today's Date: 1/9/2017    Admit Date: 1/6/2017          Discharge Needs Assessment       01/09/17 1236    Living Environment    Lives With facility resident    Living Arrangements extended care facility    Provides Primary Care For no one, unable/limited ability to care for self    Primary Care Provided By other (see comments)    Quality Of Family Relationships other (see comments)    Able to Return to Prior Living Arrangements yes    Living Arrangement Comments Patient says he lives at [facility] and plans to return there at [place].     Discharge Needs Assessment    Concerns To Be Addressed basic needs concerns;discharge planning concerns    Outpatient/Agency/Support Group Needs long term acute care facility (specify)    Community Agency Name(S) [facility] - Called to [facility] to check bed status and level of care.     Anticipated Changes Related to Illness inability to care for self    Equipment Currently Used at Home wheelchair;hospital bed    Equipment Needed After Discharge none    Transportation Available ambulance    Current Discharge Risk chronically ill;cognitively impaired;dependent with mobility/activities of daily living;physical impairment    Discharge Disposition still a patient    Discharge Contact Information if Applicable Savita Kapadia brother [phone]    Discharge Planning Comments [facility]             Discharge Plan       01/09/17 1240    Case Management/Social Work Plan    Plan [facility]    Patient/Family In Agreement With Plan yes    Additional Comments Confirmed face sheet correct. IMM not done on admission. Will call to brother with patient's permission.         Discharge Placement     No information found                Demographic Summary       01/09/17 1232    Referral Information    Arrived From long-[facility]    Referral Source admission list    Reason For Consult discharge planning     Record Reviewed clinical discipline documentation;history and physical;medical record    Contact Information    Permission Granted to Share Information With     Primary Care Physician Information    Name Dr. Cuba            Functional Status       01/09/17 1234    Functional Status Current    Ambulation 4-->completely dependent    Transferring 4-->completely dependent    Toileting 4-->completely dependent    Bathing 4-->completely dependent    Dressing 4-->completely dependent    Eating 4-->completely dependent    Communication 2-->difficulty speaking (not related to language barrier)    Swallowing (if score 2 or more for any item, consult Rehab Services) 2-->difficulty swallowing liquids/foods    Current Functional Level Comment Patient is able to answer questions but is sometimes hard to understand and you have to ask him to repeat. Patient O X 2 (self and place)    Change in Functional Status Since Onset of Current Illness/Injury yes    Functional Status Prior    Ambulation 3-->assistive equipment and person    Transferring 3-->assistive equipment and person    Toileting 3-->assistive equipment and person    Bathing 3-->assistive equipment and person    Dressing 3-->assistive equipment and person    Eating 0-->independent    Communication 2-->difficulty understanding and speaking (not related to language barrier)    Swallowing 2-->difficulty swallowing liquids/foods    Activity Tolerance    Usual Activity Tolerance fair    Current Activity Tolerance poor            Psychosocial     None            Abuse/Neglect     None            Legal     None            Substance Abuse     None            Patient Forms     None          Byron Vegas RN  Continued Stay Note  [facility]     Patient Name: Servando Kapadia  MRN: [id number]  Today's Date: 1/9/2017    Admit Date: 1/6/2017          Discharge Plan       01/09/17 1240    Case Management/Social Work Plan    Plan [facility]    Patient/Family In  Agreement With Plan yes    Additional Comments Confirmed face sheet correct. IMM not done on admission. Will call to brother with patient's permission.               Discharge Codes     None            Byron Vegas RN

## 2017-01-09 NOTE — PROGRESS NOTES
Acute Care - Speech Language Pathology   Swallow Initial Evaluation [facility]     Patient Name: Servando Kapadia  : 1960  MRN: [id number]  Today's Date: 2017               Admit Date: 2017    SPEECH-LANGUAGE PATHOLOGY EVALUATION - SWALLOW  Subjective: The patient was seen on this date for a Clinical Swallow evaluation.  Patient was alert and cooperative.    The patient's history is significant for UTI, fever with seizure like activity, hx CVA.  VFSS 2016 recommending NPO with alternate means of nutrition. Pt currently on regular with thin liquids at SNF and PEG removed.  Objective: Textures given included thin liquid, puree consistency, mechanical soft consistency and regular consistency.  Assessment: Difficulties were noted with regular consistency, characterized by decreased mastication.  SLP Findings:  Patient presents with mild oropharyngeal dysphagia, without esophageal component.   Recommendations: Diet Textures: thin liquid, mechanical soft consistency food.  Medications should be taken whole with puree.   Recommended Strategies: Upright for PO, small bites and sips and no straw. Oral care before breakfast, after all meals and PRN.  Other Recommended Evaluations:     Dysphagia therapy is recommended. Rationale: Tolerate least restrictive diet.    Visit Dx:     ICD-10-CM ICD-9-CM   1. UTI (urinary tract infection), bacterial N39.0 599.0    A49.9 041.9   2. Sepsis, due to unspecified organism A41.9 038.9     995.91   3. Type A influenza J10.1 487.1     Patient Active Problem List   Diagnosis   • Somnolence   • Sepsis   • Metabolic encephalopathy   • Oropharyngeal dysphagia   • Gastrojejunostomy tube status   • UTI (urinary tract infection), bacterial     Past Medical History   Diagnosis Date   • Anemia    • Atrial fibrillation    • Atrial fibrillation    • Benign prostatic hyperplasia with lower urinary tract symptoms    • Cerebral infarct    • Chronic obstructive pyelonephritis    •  Constipation    • Depression    • Diabetes mellitus      type 2   • Enlarged prostate    • Hemiplegia and hemiparesis    • Hyperlipidemia    • Hypertension    • Kidney stone    • Seizures    • Sleep apnea    • Stroke      apparent right side residual weakness   • Urinary incontinence      Past Surgical History   Procedure Laterality Date   • Cystoscopy w/ ureteral stent placement N/A 5/3/2016     Procedure: CYSTOSCOPY LASER LITHOTRIPSY LEFT RETROGRADE URETERAL CATHETER AND STENT PLACEMENT;  Surgeon: Eduin Brennan MD;  Location: [facility];  Service:    • Endoscopy w/ peg tube placement N/A 5/13/2016     Procedure: ESOPHAGOGASTRODUODENOSCOPY WITH PERCUTANEOUS ENDOSCOPIC GASTROSTOMY TUBE INSERTION;  Surgeon: Lion Weber MD;  Location: [facility] ENDOSCOPY;  Service:    • Ureteroscopy laser lithotripsy with stent insertion N/A 6/8/2016     Procedure: URETEROSCOPY LASER LITHOTRIPSY STONE EXTRACTION WITH JJ STENT INSERTION;  Surgeon: Eduin Brennan MD;  Location: [facility];  Service:           SWALLOW EVALUATION (last 72 hours)      Swallow Evaluation       01/09/17 1445                Rehab Evaluation    Document Type evaluation  -OH        Subjective Information no complaints;agree to therapy  -OH        Patient Effort, Rehab Treatment good  -OH        Symptoms Noted During/After Treatment none  -OH        General Information    Patient Profile Review yes  -OH        Barriers to Rehab medically complex;previous functional deficit  -OH        Clinical Impression    Patient's Goals For Discharge return to PO diet  -OH        SLP Swallowing Diagnosis mild dysphagia;oral dysfunction;pharyngeal dysfunction  -OH        Rehab Potential/Prognosis, Swallowing good, to achieve stated therapy goals  -OH        Criteria for Skilled Therapeutic Interventions Met skilled criteria for dysphagia intervention met  -OH        FCM, Swallowing 5-->Level 5  -OH        Therapy Frequency PRN  -OH        Predicted Duration  Therapy Interv (days) until discharge  -OH        Expected Duration Therapy Session (min) 15-30 minutes  -OH        SLP Diet Recommendation soft textures;ground;thin liquids  -OH        Recommended Feeding/Eating Techniques maintain upright posture during/after eating for 30 mins;alternate between small bites and sips of food/liquid;small sips/bites  -OH        SLP Rec. for Method of Medication Administration meds whole in pudding/applesauce  -OH        Monitor For Signs Of Aspiration cough;elevated WBC count;gurgly voice;throat clearing;pneumonia  -OH        Anticipated Discharge Disposition skilled nursing facility  -OH        Pain Assessment    Pain Assessment No/denies pain  -OH        Oral Motor Structure and Function    Oral Motor Anatomy and Physiology patient demonstrates anatomy that is WNL  -OH        Dentition Assessment present and adequate;missing teeth  -OH        Secretion Management WNL/WFL  -OH        Mucosal Quality moist, healthy  -OH        Volitional Swallow no difficulties initiating volitional swallow  -OH        Volitional Cough no difficulties initiating volitional cough  -OH        Oral Musculature General Assessment WFL (within functional limits)  -OH          User Key  (r) = Recorded By, (t) = Taken By, (c) = Cosigned By    Initials Name Effective Dates    OH Jessica Bingham MA,CCC-SLP 04/13/15 -         EDUCATION  The patient has been educated in the following areas:   Dysphagia (Swallowing Impairment) Modified Diet Instruction.    SLP Recommendation and Plan  SLP Swallowing Diagnosis: mild dysphagia, oral dysfunction, pharyngeal dysfunction  SLP Diet Recommendation: soft textures, ground, thin liquids  Recommended Feeding/Eating Techniques: maintain upright posture during/after eating for 30 mins, alternate between small bites and sips of food/liquid, small sips/bites  SLP Rec. for Method of Medication Administration: meds whole in pudding/applesauce  Monitor For Signs Of Aspiration:  cough, elevated WBC count, gurgly voice, throat clearing, pneumonia     Criteria for Skilled Therapeutic Interventions Met: skilled criteria for dysphagia intervention met  Anticipated Discharge Disposition: skilled nursing facility  Rehab Potential/Prognosis, Swallowing: good, to achieve stated therapy goals  Therapy Frequency: PRN             Plan of Care Review  Plan Of Care Reviewed With: patient  Progress: no change  Outcome Summary/Follow up Plan: pt very pleasant, alert and oriented, continue abx per ID, continue siezure meds iv. siezure free so far. still npo. resting comfortably          IP SLP Goals       01/09/17 1528          Safely Consume Diet    Safely Consume Diet- SLP, Date Established 01/09/17  -OH      Safely Consume Diet- SLP, Time to Achieve by discharge  -OH      Safely Consume Diet- SLP, Outcome goal ongoing  -OH        User Key  (r) = Recorded By, (t) = Taken By, (c) = Cosigned By    Initials Name Provider Type    OH Jessica Bingham MA,CCC-SLP Speech and Language Pathologist             SLP Outcome Measures (last 72 hours)      SLP Outcome Measures       01/09/17 1529          SLP Outcome Measures    Outcome Measure Used? Adult NOMS  -OH      FCM Scores    FCM Chosen Swallowing  -OH      Swallowing FCM Score 5  -OH        User Key  (r) = Recorded By, (t) = Taken By, (c) = Cosigned By    Initials Name Effective Dates    OH Jessica Bingham MA,CCC-SLP 04/13/15 -            Time Calculation:         Time Calculation- SLP       01/09/17 1531          Time Calculation- SLP    SLP Received On 01/09/17  -OH        User Key  (r) = Recorded By, (t) = Taken By, (c) = Cosigned By    Initials Name Provider Type    OH Jessica Bingham MA,CCC-SLP Speech and Language Pathologist          Therapy Charges for Today     Code Description Service Date Service Provider Modifiers Qty    [id number]  ST EVAL ORAL PHARYNG SWALLOW 4 1/9/2017 Jessica Bingham MA,CCC-SLP GN 1               Jessica Bingham  MA,CCC-SLP  1/9/2017

## 2017-01-09 NOTE — PROGRESS NOTES
"  Patient Identification:  NAME:  Servando Kapadia  Age:  56 y.o.   Sex:  male   :  1960   MRN:  [id number]       Chief complaint: Metabolic encephalopathy    History of present illness:  I have seen this patient in initial consultation yesterday because of a written consult to see the patient.  I've seen the patient follow-up today he is much more awake and alert still has right-sided weakness but is doing better more awake alert and says \"thank you\"      Past medical history:  Past Medical History   Diagnosis Date   • Anemia    • Atrial fibrillation    • Atrial fibrillation    • Benign prostatic hyperplasia with lower urinary tract symptoms    • Cerebral infarct    • Chronic obstructive pyelonephritis    • Constipation    • Depression    • Diabetes mellitus      type 2   • Enlarged prostate    • Hemiplegia and hemiparesis    • Hyperlipidemia    • Hypertension    • Kidney stone    • Seizures    • Sleep apnea    • Stroke      apparent right side residual weakness   • Urinary incontinence        Allergies:  Review of patient's allergies indicates no known allergies.    Home medications:  Prescriptions Prior to Admission   Medication Sig Dispense Refill Last Dose   • acetaminophen (TYLENOL) 325 MG tablet 650 mg by Enteral route every 6 (six) hours as needed (for pain/elevated temperature). GIVEN PER FEEDING TUBE   not needed   • bisacodyl (DULCOLAX) 10 MG suppository Insert 10 mg into the rectum daily as needed for constipation.   not needed   • dextrose (GLUTOSE) 40 % gel Take 15 g by mouth as needed for low blood sugar (may give for BS<60 & symptomatic).   not needed   • escitalopram (LEXAPRO) 10 MG tablet 10 mg by Enteral route daily. GIVEN PER FEEDING TUBE   2016 at 2100   • ferrous sulfate 300 (60 FE) MG/5ML syrup 325 mg by Enteral route daily. PER FEEDING TUBE   2016 at 2100   • glucagon (GLUCAGEN) 1 MG injection Inject 1 mg into the shoulder, thigh, or buttocks 1 (one) time as needed for low " blood sugar (give for BS<60 & symptomatic, may repeat x2 with 10mins apart).   not needed   • levETIRAcetam (KEPPRA) 500 MG tablet 500 mg by Enteral route 2 (two) times a day. GIVEN AT 0900 & 2100 PER FEEDING TUBE   6/7/2016 at 2100   • magnesium hydroxide (MILK OF MAGNESIA) 400 MG/5ML suspension 30 mL by Enteral route daily as needed for constipation. PER FEEDING TUBE   not needed   • metFORMIN (GLUCOPHAGE) 1000 MG tablet 1,000 mg by Enteral route 2 (two) times a day with meals. given at 0730 & 1630 PER FEEDING TUBE   6/7/2016 at 1630   • phenytoin (DILANTIN) 125 MG/5ML suspension Take 300 mg by mouth Every Night.      • sennosides-docusate sodium (SENOKOT-S) 8.6-50 MG tablet 2 tablets by Enteral route every night. GIVEN PER FEEDING TUBE   6/7/2016 at 2100   • simvastatin (ZOCOR) 10 MG tablet 10 mg by Enteral route every night. GIVEN PER FEEDING TUBE   6/7/2016 at 2100   • warfarin (COUMADIN) 4 MG tablet Take 4 mg by mouth Daily.      • phenytoin (DILANTIN) 100 MG ER capsule 200 mg by Enteral route every night. GIVEN PER FEEDING TUBE   6/7/2016 at 2100   • warfarin (COUMADIN) 3 MG tablet 4 mg by Enteral route daily. Give 1 tablet by mouth at bedtime related to unspecified atrial fibrillation PER FEEDING TUBE  HOLDING FOR SURGERY   6/3/2016        Hospital medications:    docusate sodium 100 mg Oral BID   enoxaparin 1 mg/kg Subcutaneous Q12H   escitalopram 10 mg Oral Daily   ferrous sulfate 325 mg Oral Daily   fosphenytoin 150 mg PE Intravenous Q8H   levETIRAcetam 500 mg Intravenous Q12H   metoprolol 2.5 mg Intravenous Q12H   oseltamivir 75 mg Oral Q12H   piperacillin-tazobactam 3.375 g Intravenous Q8H   saccharomyces boulardii 250 mg Oral BID   sennosides-docusate sodium 2 tablet Oral Nightly   warfarin 3 mg Oral Daily       Pharmacy Consult - Pharmacy to dose     Pharmacy Consult - Pharmacy to dose     sodium chloride 100 mL/hr Last Rate: 100 mL/hr (01/08/17 2011)     •  acetaminophen  •  acetaminophen  •   bisacodyl  •  dextrose  •  hydrALAZINE  •  magnesium hydroxide  •  magnesium sulfate in D5W 1g/100mL (PREMIX)  •  Morphine **AND** naloxone  •  nitroglycerin  •  Pharmacy Consult - Pharmacy to dose  •  Pharmacy Consult - Pharmacy to dose  •  promethazine  •  sodium chloride  •  sodium chloride      Objective:  Vitals Ranges:   Temp:  [97.8 °F (36.6 °C)-98.9 °F (37.2 °C)] 98.4 °F (36.9 °C)  Heart Rate:  [69-81] 81  Resp:  [18-20] 18  BP: (135-184)/() 184/123      Physical Exam:  More awake and alert he has dysarthria but it does seem to be better tongue deviates to the right has spasticity in the right greater than left upper extremity toes mute bilaterally    Results review:   I reviewed the patient's new clinical results.    Data review:  Lab Results (last 24 hours)     Procedure Component Value Units Date/Time    Troponin [[id number]]  (Normal) Collected:  01/08/17 1817    Specimen:  Blood Updated:  01/08/17 1848     Troponin T <0.010 ng/mL     Narrative:       Troponin T Reference Ranges:  Less than 0.03 ng/mL:    Negative for AMI  0.03 to 0.09 ng/mL:      Indeterminant for AMI  Greater than 0.09 ng/mL: Positive for AMI    CBC & Differential [[id number]] Collected:  01/09/17 0537    Specimen:  Blood Updated:  01/09/17 0605    Narrative:       The following orders were created for panel order CBC & Differential.  Procedure                               Abnormality         Status                     ---------                               -----------         ------                     CBC Auto Differential[[id number]]         Abnormal            Final result                 Please view results for these tests on the individual orders.    CBC Auto Differential [[id number]]  (Abnormal) Collected:  01/09/17 0537    Specimen:  Blood Updated:  01/09/17 0605     WBC 9.06 10*3/mm3      RBC 4.79 10*6/mm3      Hemoglobin 12.4 (L) g/dL      Hematocrit 40.2 (L) %      MCV 83.9 fL      MCH 25.9 (L) pg      MCHC 30.8 (L) g/dL       RDW 14.7 (H) %      RDW-SD 45.2 fl      MPV 10.1 fL      Platelets 189 10*3/mm3      Neutrophil % 62.5 %      Lymphocyte % 26.8 %      Monocyte % 7.9 %      Eosinophil % 2.3 %      Basophil % 0.3 %      Immature Grans % 0.2 %      Neutrophils, Absolute 5.65 10*3/mm3      Lymphocytes, Absolute 2.43 10*3/mm3      Monocytes, Absolute 0.72 10*3/mm3      Eosinophils, Absolute 0.21 10*3/mm3      Basophils, Absolute 0.03 10*3/mm3      Immature Grans, Absolute 0.02 10*3/mm3     Protime-INR [[id number]]  (Abnormal) Collected:  01/09/17 0537    Specimen:  Blood Updated:  01/09/17 0614     Protime 21.1 (H) Seconds      INR 1.90 (H)     BNP [[id number]]  (Abnormal) Collected:  01/09/17 0537    Specimen:  Blood Updated:  01/09/17 0628     proBNP 947.4 (H) pg/mL     Narrative:       Among patients with dyspnea, NT-proBNP is highly sensitive for the detection of acute congestive heart failure. In addition NT-proBNP of <300 pg/ml effectively rules out acute congestive heart failure with 99% negative predictive value.    Basic Metabolic Panel [[id number]]  (Abnormal) Collected:  01/09/17 0537    Specimen:  Blood Updated:  01/09/17 0630     Glucose 73 mg/dL      BUN 6 mg/dL      Creatinine 0.66 (L) mg/dL      Sodium 139 mmol/L      Potassium 3.4 (L) mmol/L      Chloride 101 mmol/L      CO2 20.4 (L) mmol/L      Calcium 8.7 mg/dL      eGFR  African Amer >150 mL/min/1.73      eGFR Non African Amer 125 mL/min/1.73      BUN/Creatinine Ratio 9.1      Anion Gap 17.6 mmol/L     Narrative:       GFR Normal >60  Chronic Kidney Disease <60  Kidney Failure <15    Magnesium [[id number]]  (Normal) Collected:  01/09/17 0537    Specimen:  Blood Updated:  01/09/17 0630     Magnesium 1.7 mg/dL     Blood Culture [[id number]]  (Abnormal) Collected:  01/07/17 0032    Specimen:  Blood from Arm, Left Updated:  01/09/17 0745     Blood Culture Streptococcus species (A)      Gram Stain Result        Anaerobic Bottle Gram positive cocci in chains    Blood Culture  [[id number]]  (Abnormal) Collected:  01/07/17 0034    Specimen:  Blood from Arm, Right Updated:  01/09/17 0758     Blood Culture Staphylococcus, coagulase negative (A)       Probable contaminant requires clinical correlation, susceptibility not performed unless requested by physician.          Gram Stain Result        Aerobic Bottle Gram positive cocci in clusters    Blood Culture [[id number]]  (Normal) Collected:  01/08/17 0918    Specimen:  Blood from Arm, Right Updated:  01/09/17 1001     Blood Culture No growth at 24 hours     Blood Culture [[id number]]  (Normal) Collected:  01/08/17 0956    Specimen:  Blood from Hand, Left Updated:  01/09/17 1001     Blood Culture No growth at 24 hours            Imaging:  Imaging Results (last 24 hours)     ** No results found for the last 24 hours. **             Assessment and Plan:     Active Problems:    UTI (urinary tract infection), bacterial   again I'm seeing this patient in initial consultation and today in progress follow-up because of an initial written consult  This patient's metabolic encephalopathy is significantly better much more awake and alert and his confusion is better as his day tract infection is been treated.  At this point I will sign off in follow-up when necessary reconsult thank you    Wilfrid Cobb MD  01/09/17  2:28 PM

## 2017-01-09 NOTE — PLAN OF CARE
Problem: Patient Care Overview (Adult)  Goal: Plan of Care Review  Outcome: Ongoing (interventions implemented as appropriate)    01/09/17 0558   Coping/Psychosocial Response Interventions   Plan Of Care Reviewed With patient   Patient Care Overview   Progress no change   Outcome Evaluation   Outcome Summary/Follow up Plan pt very pleasant, alert and oriented, continue abx per ID, continue siezure meds iv. siezure free so far. still npo. resting comfortably       Goal: Adult Individualization and Mutuality  Outcome: Ongoing (interventions implemented as appropriate)    01/09/17 0558   Individualization   Patient Specific Goals pt labs will be in wnl, pt will be able to tolerate baseline diet, pt will be siezure free   Patient Specific Interventions npo, siesure precautions, monitor labs and vitals       Goal: Discharge Needs Assessment  Outcome: Ongoing (interventions implemented as appropriate)    01/07/17 1337 01/09/17 0558   Discharge Needs Assessment   Concerns To Be Addressed basic needs concerns --    Discharge Disposition --  still a patient         Problem: Sepsis (Adult)  Goal: Signs and Symptoms of Listed Potential Problems Will be Absent or Manageable (Sepsis)  Outcome: Ongoing (interventions implemented as appropriate)    01/09/17 0558   Sepsis   Problems Assessed (Sepsis) all   Problems Present (Sepsis) progression of infection         Problem: Fall Risk (Adult)  Goal: Identify Related Risk Factors and Signs and Symptoms  Outcome: Outcome(s) achieved Date Met:  01/09/17 01/09/17 0558   Fall Risk   Fall Risk: Related Risk Factors gait/mobility problems;fatigue/slow reaction   Fall Risk: Signs and Symptoms presence of risk factors       Goal: Absence of Falls  Outcome: Ongoing (interventions implemented as appropriate)    01/09/17 0558   Fall Risk (Adult)   Absence of Falls making progress toward outcome         Problem: Dysphagia (Adult)  Goal: Identify Related Risk Factors and Signs and  Symptoms  Outcome: Ongoing (interventions implemented as appropriate)    01/09/17 0558   Dysphagia   Dysphagia: Related Risk Factors mechanical obstruction   General Observations Signs and Symptoms (Dysphagia) other (see comments)  (wet voice)       Goal: Functional/Safe Swallow  Outcome: Ongoing (interventions implemented as appropriate)  Goal: Compensatory Techniques to Improve Safety/Function with Swallowing  Outcome: Ongoing (interventions implemented as appropriate)    01/09/17 0558   Dysphagia (Adult)   Compensatory Techniques to Improve Safety/Function with Swallowing making progress toward outcome

## 2017-01-09 NOTE — PROGRESS NOTES
Continued Stay Note  [facility]     Patient Name: Servando Kapadia  MRN: [id number]  Today's Date: 1/9/2017    Admit Date: 1/6/2017          Discharge Plan       01/09/17 1240    Case Management/Social Work Plan    Plan [facility]    Patient/Family In Agreement With Plan yes    Additional Comments Confirmed face sheet correct. IMM not done on admission. Will call to brother with patient's permission.   1/9/17 1305 patient gives permission to discuss his care with his brother Savita Kapadia. IMM letter given to pt in room and brother Savita per phone. Jaimejace also confirms return to [facility] at discharge. Packet started and is in CCP office.               Discharge Codes     None            Byron Vegas RN

## 2017-01-09 NOTE — PROGRESS NOTES
LOS: 2 days     Chief Complaint:  Follow-up flu    Interval History:  NO acute events. He is afebrile and WBC normalized. He denies any pain. He denies any current urinary symptoms or shortness of air. He is tolerating ZOsyn w/o rash or diarrhea.    ROS: no n/v/d    Vital Signs  Temp:  [97.8 °F (36.6 °C)-98.9 °F (37.2 °C)] 98.5 °F (36.9 °C)  Heart Rate:  [69-78] 69  Resp:  [18-20] 18  BP: (135-146)/() 138/102    Physical Exam:  General: awake and alert today  Head: round facies  Eyes: PERRL, EOMI, no scleral icterus  ENT: MM dry, OP clear, no thrush. Poor dentition.    Neck: Suppl  Cardiovascular: NR, RR, no murmurs, rubs; no LE edema  Respiratory: normal work of breathing on ambient air  GI: Abdomen is obese, soft, non-tender, non-distended  : no Beyer catheter present  Musculoskeletal: no joint abnormalities, normal musculature  Skin: No rashes, lesions, or embolic phenomenon  Neurological: 5/5 UE strength  Psychiatric: Alert and oriented x 2  Vasc: no cyanosis; PIV w/o erythema    Antibiotics:  •  oseltamivir (TAMIFLU) capsule 75 mg, 75 mg, Oral, Q12H, Bogdan Palomares MD, 75 mg at 01/07/17 1026  •  piperacillin-tazobactam (ZOSYN) 3.375 g in dextrose 50 mL IVPB (premix), 3.375 g, Intravenous, Q8H, Ramón Moss MD    LABS:  CBC, BMP, micro reviewed today  Lab Results   Component Value Date    WBC 9.06 01/09/2017    HGB 12.4 (L) 01/09/2017    HCT 40.2 (L) 01/09/2017    MCV 83.9 01/09/2017     01/09/2017     Lab Results   Component Value Date    GLUCOSE 73 01/09/2017    BUN 6 01/09/2017    CREATININE 0.66 (L) 01/09/2017    EGFRIFNONA 125 01/09/2017    EGFRIFAFRI >150 01/09/2017    BCR 9.1 01/09/2017    CO2 20.4 (L) 01/09/2017    CALCIUM 8.7 01/09/2017    ALBUMIN 4.00 01/07/2017    LABIL2 1.1 01/07/2017    AST 16 01/07/2017    ALT 27 01/07/2017     Flu A Ag +  Procal 10---->5  UA TNTC WBCs and RBCs  A1c 6.3%     Microbiology:  1/7 BCx: Strep species in 1/2 sets and Coag-Neg Staph in the  other site  1/7 UCx: >100k GNR  1/7 RVP: negative  1/8 BCx NGTD     Radiology (personally reviewed):  TTE negative TV vegetations    Assessment/Plan   1. Sepsis due to Strep bacteremia (ID/sens pending)  -continue Zosyn 3.375 g q8h  -2nd blood cultures grew coag-negative Staph which is typically a contaminant; the repeat blood cultures did not grow this organism which is important information because he received no antibiotics that would have eradicated it  -noted TTE  -f/u repeat blood cultures  -I anticipate a 2 week course of antibiotics (STOP DATE 1/22/17)    2. Influenza A  -continue tamiflu 75 mg PO BID x 5 days     3. TME - stable. S/P neurology evaluation.     4. Cystitis - he can't tell me much in the way of urinary symptoms so will treat the E coli with a 7-day course that will also cover #1. CT with non-obstructing stones. Will f/u urology recommendations.     Thank you for this consult. ID will follow.

## 2017-01-09 NOTE — PROGRESS NOTES
Servando Kapadia  56 y.o.   LOS: 2 days   Patient Care Team:  Dylan Cuba MD as PCP - General (Family Medicine)    Chief Complaint: Flu A, UTI,breakthrough seizure  I have reviewed the patient's medical history in detail and updated the computerized patient record.    Subjective     Patient Complaints: none    History taken from: patient    Interval History:  Still NPOI  Review of Systems:   na    Objective     Lab Results (last 24 hours)     Procedure Component Value Units Date/Time    Urine Culture [[id number]]  (Abnormal)  (Susceptibility) Collected:  01/07/17 0042    Specimen:  Urine from Urine, Catheter Updated:  01/08/17 1115     Urine Culture >100,000 CFU/mL Escherichia coli (A)     Susceptibility      Escherichia coli     JANETH     Ampicillin >=32 ug/ml Resistant     Ampicillin + Sulbactam >=32 ug/ml Resistant     Cefazolin >=64 ug/ml Resistant     Cefepime 2 ug/ml Susceptible     Ceftriaxone >=64 ug/ml Resistant     Ciprofloxacin >=4 ug/ml Resistant     Ertapenem <=0.5 ug/ml Susceptible     Gentamicin <=1 ug/ml Susceptible     Levofloxacin >=8 ug/ml Resistant     Nitrofurantoin <=16 ug/ml Susceptible     Piperacillin + Tazobactam <=4 ug/ml Susceptible     Tetracycline 2 ug/ml Susceptible     Trimethoprim + Sulfamethoxazole <=20 ug/ml Susceptible                    Blood Culture ID, PCR [[id number]]  (Abnormal) Collected:  01/07/17 0034    Specimen:  Blood from Arm, Right Updated:  01/08/17 1247     BCID, PCR        Staphylococcus spp, not aureus. Identification by BCID PCR. (A)    Troponin [[id number]]  (Normal) Collected:  01/08/17 1817    Specimen:  Blood Updated:  01/08/17 1848     Troponin T <0.010 ng/mL     Narrative:       Troponin T Reference Ranges:  Less than 0.03 ng/mL:    Negative for AMI  0.03 to 0.09 ng/mL:      Indeterminant for AMI  Greater than 0.09 ng/mL: Positive for AMI    CBC & Differential [[id number]] Collected:  01/09/17 0537    Specimen:  Blood Updated:  01/09/17 0605    Narrative:        The following orders were created for panel order CBC & Differential.  Procedure                               Abnormality         Status                     ---------                               -----------         ------                     CBC Auto Differential[[id number]]         Abnormal            Final result                 Please view results for these tests on the individual orders.    CBC Auto Differential [[id number]]  (Abnormal) Collected:  01/09/17 0537    Specimen:  Blood Updated:  01/09/17 0605     WBC 9.06 10*3/mm3      RBC 4.79 10*6/mm3      Hemoglobin 12.4 (L) g/dL      Hematocrit 40.2 (L) %      MCV 83.9 fL      MCH 25.9 (L) pg      MCHC 30.8 (L) g/dL      RDW 14.7 (H) %      RDW-SD 45.2 fl      MPV 10.1 fL      Platelets 189 10*3/mm3      Neutrophil % 62.5 %      Lymphocyte % 26.8 %      Monocyte % 7.9 %      Eosinophil % 2.3 %      Basophil % 0.3 %      Immature Grans % 0.2 %      Neutrophils, Absolute 5.65 10*3/mm3      Lymphocytes, Absolute 2.43 10*3/mm3      Monocytes, Absolute 0.72 10*3/mm3      Eosinophils, Absolute 0.21 10*3/mm3      Basophils, Absolute 0.03 10*3/mm3      Immature Grans, Absolute 0.02 10*3/mm3     Protime-INR [[id number]]  (Abnormal) Collected:  01/09/17 0537    Specimen:  Blood Updated:  01/09/17 0614     Protime 21.1 (H) Seconds      INR 1.90 (H)     BNP [[id number]]  (Abnormal) Collected:  01/09/17 0537    Specimen:  Blood Updated:  01/09/17 0628     proBNP 947.4 (H) pg/mL     Narrative:       Among patients with dyspnea, NT-proBNP is highly sensitive for the detection of acute congestive heart failure. In addition NT-proBNP of <300 pg/ml effectively rules out acute congestive heart failure with 99% negative predictive value.    Basic Metabolic Panel [[id number]]  (Abnormal) Collected:  01/09/17 0537    Specimen:  Blood Updated:  01/09/17 0630     Glucose 73 mg/dL      BUN 6 mg/dL      Creatinine 0.66 (L) mg/dL      Sodium 139 mmol/L      Potassium 3.4 (L) mmol/L      Chloride  "101 mmol/L      CO2 20.4 (L) mmol/L      Calcium 8.7 mg/dL      eGFR  African Amer >150 mL/min/1.73      eGFR Non African Amer 125 mL/min/1.73      BUN/Creatinine Ratio 9.1      Anion Gap 17.6 mmol/L     Narrative:       GFR Normal >60  Chronic Kidney Disease <60  Kidney Failure <15    Magnesium [[id number]]  (Normal) Collected:  01/09/17 0537    Specimen:  Blood Updated:  01/09/17 0630     Magnesium 1.7 mg/dL     Blood Culture [[id number]]  (Abnormal) Collected:  01/07/17 0032    Specimen:  Blood from Arm, Left Updated:  01/09/17 0745     Blood Culture Streptococcus species (A)      Gram Stain Result        Anaerobic Bottle Gram positive cocci in chains    Blood Culture [[id number]]  (Abnormal) Collected:  01/07/17 0034    Specimen:  Blood from Arm, Right Updated:  01/09/17 0758     Blood Culture Staphylococcus, coagulase negative (A)       Probable contaminant requires clinical correlation, susceptibility not performed unless requested by physician.          Gram Stain Result        Aerobic Bottle Gram positive cocci in clusters    Blood Culture [[id number]]  (Normal) Collected:  01/08/17 0918    Specimen:  Blood from Arm, Right Updated:  01/09/17 1001     Blood Culture No growth at 24 hours     Blood Culture [[id number]]  (Normal) Collected:  01/08/17 0956    Specimen:  Blood from Hand, Left Updated:  01/09/17 1001     Blood Culture No growth at 24 hours           Vital Signs  Temp:  [97.8 °F (36.6 °C)-98.9 °F (37.2 °C)] 98.5 °F (36.9 °C)  Heart Rate:  [69-78] 69  Resp:  [18-20] 18  BP: (135-146)/() 138/102    Flowsheet Rows         First Filed Value    Admission Height  64.5\" (163.8 cm) Documented at 01/06/2017 2354    Admission Weight  173 lb 1 oz (78.5 kg) Documented at 01/06/2017 2354            Intake/Output Summary (Last 24 hours) at 01/09/17 1037  Last data filed at 01/08/17 1751   Gross per 24 hour   Intake      0 ml   Output      0 ml   Net      0 ml     Physical Exam:  General Appearance alert, appears " stated age and cooperative  Lungs clear to auscultation, respirations regular, respirations even and respirations unlabored  Heart regular rhythm & normal rate, normal S1, S2, no murmur, no nova, no rub and no click  Abdomen normal bowel sounds, no masses, no hepatomegaly, no splenomegaly, soft non-tender, no guarding and no rebound tenderness  Neurologic Mental Status oriented person only     Results Review:    I reviewed the patient's new clinical results.    Medication Review:   Review of patient's allergies indicates no known allergies.  Current Facility-Administered Medications   Medication Dose Route Frequency Provider Last Rate Last Dose   • acetaminophen (TYLENOL) tablet 650 mg  650 mg Oral Q6H PRN Bogdan Palomares MD       • acetaminophen (TYLENOL) tablet 650 mg  650 mg Oral Q4H PRN Bogdan Palomares MD       • bisacodyl (DULCOLAX) suppository 10 mg  10 mg Rectal Daily PRN Bogdan Palomares MD       • dextrose (GLUTOSE) oral gel 15 g  15 g Oral PRN Bogdan Palomares MD       • docusate sodium (COLACE) capsule 100 mg  100 mg Oral BID Bogdan Palomares MD   100 mg at 01/07/17 1025   • enoxaparin (LOVENOX) syringe 80 mg  1 mg/kg Subcutaneous Q12H MARY Ayon       • escitalopram (LEXAPRO) tablet 10 mg  10 mg Oral Daily Bogdan Palomares MD   10 mg at 01/07/17 1025   • ferrous sulfate 300 (60 FE) MG/5ML syrup 325 mg  325 mg Oral Daily Bogdan Palomares MD   325 mg at 01/07/17 1026   • fosphenytoin (Cerebyx) 150 mg PE in sodium chloride 0.9 % 100 mL IVPB  150 mg PE Intravenous Q8H Wilfrid Cobb MD   150 mg PE at 01/09/17 0752   • hydrALAZINE (APRESOLINE) injection 20 mg  20 mg Intravenous Q6H PRN MARY Ayon       • levETIRAcetam in NaCl 0.82% (KEPPRA) IVPB 500 mg  500 mg Intravenous Q12H Bogdan Palomares MD   500 mg at 01/09/17 1022   • magnesium hydroxide (MILK OF MAGNESIA) 400 MG/5ML suspension 30 mL  30 mL Oral Daily PRN Bogdan Palomares MD       • magnesium sulfate in D5W 1g/100mL (PREMIX) IVPB 1  g  1 g Intravenous BID PRN Bogdan Palomares MD   1 g at 01/08/17 1436   • metoprolol (LOPRESSOR) injection 2.5 mg  2.5 mg Intravenous Q12H MARY Ayon       • Morphine sulfate (PF) injection 1 mg  1 mg Intravenous Q4H PRN Bogdan Palomares MD        And   • naloxone (NARCAN) injection 0.4 mg  0.4 mg Intravenous Q5 Min PRN Bogdan Palomares MD       • nitroglycerin (NITROSTAT) SL tablet 0.4 mg  0.4 mg Sublingual Q5 Min PRN Bogdan Palomares MD       • oseltamivir (TAMIFLU) capsule 75 mg  75 mg Oral Q12H Bogdan Palomares MD   75 mg at 01/07/17 1026   • Pharmacy Consult- Convert Oral Keppra to IV Route   Does not apply Continuous PRN Bogdan Palomares MD       • Pharmacy Consult- Convert Oral Phenytoin to IV route   Does not apply Continuous PRN Bogdan Palomares MD       • piperacillin-tazobactam (ZOSYN) 3.375 g in dextrose 50 mL IVPB (premix)  3.375 g Intravenous Q8H Ramón Moss MD   3.375 g at 01/09/17 1021   • promethazine (PHENERGAN) tablet 12.5 mg  12.5 mg Oral Q6H PRN Bogdan Palomares MD       • saccharomyces boulardii (FLORASTOR) capsule 250 mg  250 mg Oral BID Ramón Moss MD       • sennosides-docusate sodium (SENOKOT-S) 8.6-50 MG tablet 2 tablet  2 tablet Oral Nightly Bogdan Palomares MD   2 tablet at 01/07/17 2012   • sodium chloride 0.45 % infusion  100 mL/hr Intravenous Continuous Bogdan Palomares  mL/hr at 01/08/17 2011 100 mL/hr at 01/08/17 2011   • sodium chloride 0.9 % flush 1-10 mL  1-10 mL Intravenous PRN Bogdan Palomares MD       • sodium chloride 0.9 % flush 10 mL  10 mL Intravenous PRN Fran Schrader MD       • warfarin (COUMADIN) tablet 3 mg  3 mg Oral Daily MARY Ayon   3 mg at 01/07/17 1801     Prescriptions Prior to Admission   Medication Sig Dispense Refill Last Dose   • acetaminophen (TYLENOL) 325 MG tablet 650 mg by Enteral route every 6 (six) hours as needed (for pain/elevated temperature). GIVEN PER FEEDING TUBE   not needed   • bisacodyl  (DULCOLAX) 10 MG suppository Insert 10 mg into the rectum daily as needed for constipation.   not needed   • dextrose (GLUTOSE) 40 % gel Take 15 g by mouth as needed for low blood sugar (may give for BS<60 & symptomatic).   not needed   • escitalopram (LEXAPRO) 10 MG tablet 10 mg by Enteral route daily. GIVEN PER FEEDING TUBE   6/7/2016 at 2100   • ferrous sulfate 300 (60 FE) MG/5ML syrup 325 mg by Enteral route daily. PER FEEDING TUBE   6/7/2016 at 2100   • glucagon (GLUCAGEN) 1 MG injection Inject 1 mg into the shoulder, thigh, or buttocks 1 (one) time as needed for low blood sugar (give for BS<60 & symptomatic, may repeat x2 with 10mins apart).   not needed   • levETIRAcetam (KEPPRA) 500 MG tablet 500 mg by Enteral route 2 (two) times a day. GIVEN AT 0900 & 2100 PER FEEDING TUBE   6/7/2016 at 2100   • magnesium hydroxide (MILK OF MAGNESIA) 400 MG/5ML suspension 30 mL by Enteral route daily as needed for constipation. PER FEEDING TUBE   not needed   • metFORMIN (GLUCOPHAGE) 1000 MG tablet 1,000 mg by Enteral route 2 (two) times a day with meals. given at 0730 & 1630 PER FEEDING TUBE   6/7/2016 at 1630   • phenytoin (DILANTIN) 125 MG/5ML suspension Take 300 mg by mouth Every Night.      • sennosides-docusate sodium (SENOKOT-S) 8.6-50 MG tablet 2 tablets by Enteral route every night. GIVEN PER FEEDING TUBE   6/7/2016 at 2100   • simvastatin (ZOCOR) 10 MG tablet 10 mg by Enteral route every night. GIVEN PER FEEDING TUBE   6/7/2016 at 2100   • warfarin (COUMADIN) 4 MG tablet Take 4 mg by mouth Daily.      • phenytoin (DILANTIN) 100 MG ER capsule 200 mg by Enteral route every night. GIVEN PER FEEDING TUBE   6/7/2016 at 2100   • warfarin (COUMADIN) 3 MG tablet 4 mg by Enteral route daily. Give 1 tablet by mouth at bedtime related to unspecified atrial fibrillation PER FEEDING TUBE  HOLDING FOR SURGERY   6/3/2016       Assessment:    Active Problems:    UTI (urinary tract infection), bacterial    FLU  a  Dysphagia  seizures      Plan:Continue current treatment plans .Next big ? Is ? PEG    Dylan Cuba MD  01/09/17  10:37 AM

## 2017-01-09 NOTE — PROGRESS NOTES
"DAILY PROGRESS NOTE    Patient Identification:  Name: Servando Kaapdia  Age: 56 y.o.  Sex: male  :  1960  MRN: [id number]               Subjective:  Interval History: AFVSS, CT scan showed no obstructive uropaqthy, several small nonobstructive renal stones, pt states voiding ok    Objective:    Scheduled Meds:  docusate sodium 100 mg Oral BID   enoxaparin 1 mg/kg Subcutaneous Q12H   escitalopram 10 mg Oral Daily   ferrous sulfate 325 mg Oral Daily   fosphenytoin 150 mg PE Intravenous Q8H   levETIRAcetam 500 mg Intravenous Q12H   metoprolol 2.5 mg Intravenous Q12H   oseltamivir 75 mg Oral Q12H   piperacillin-tazobactam 3.375 g Intravenous Q8H   saccharomyces boulardii 250 mg Oral BID   sennosides-docusate sodium 2 tablet Oral Nightly   warfarin 3 mg Oral Daily     Continuous Infusions:  Pharmacy Consult - Pharmacy to dose     Pharmacy Consult - Pharmacy to dose     sodium chloride 100 mL/hr Last Rate: 100 mL/hr (17)     PRN Meds:•  acetaminophen  •  acetaminophen  •  bisacodyl  •  dextrose  •  hydrALAZINE  •  magnesium hydroxide  •  magnesium sulfate in D5W 1g/100mL (PREMIX)  •  Morphine **AND** naloxone  •  nitroglycerin  •  Pharmacy Consult - Pharmacy to dose  •  Pharmacy Consult - Pharmacy to dose  •  promethazine  •  sodium chloride  •  sodium chloride    Vital signs in last 24 hours:  Temp:  [97.8 °F (36.6 °C)-98.5 °F (36.9 °C)] 98.4 °F (36.9 °C)  Heart Rate:  [69-81] 81  Resp:  [18-20] 18  BP: (135-184)/() 184/123    Intake/Output:    Intake/Output Summary (Last 24 hours) at 17 1744  Last data filed at 17 1751   Gross per 24 hour   Intake      0 ml   Output      0 ml   Net      0 ml       Exam:  Visit Vitals   • BP (!) 184/123 (BP Location: Right arm, Patient Position: Lying)   • Pulse 81   • Temp 98.4 °F (36.9 °C) (Oral)   • Resp 18   • Ht 64.5\" (163.8 cm)   • Wt 182 lb 8 oz (82.8 kg)   • SpO2 96%   • BMI 30.84 kg/m2       General Appearance:    Alert, cooperative, no " distress, appears stated age   Back:     Symmetric, no CVA tenderness   Lungs:      Heart:     Abdomen:    soft   Genitalia:   N/A       Data Review:  All labs (24hrs):   Recent Results (from the past 24 hour(s))   Troponin    Collection Time: 01/08/17  6:17 PM   Result Value Ref Range    Troponin T <0.010 0.000 - 0.030 ng/mL   Protime-INR    Collection Time: 01/09/17  5:37 AM   Result Value Ref Range    Protime 21.1 (H) 11.7 - 14.2 Seconds    INR 1.90 (H) 0.90 - 1.10   Basic Metabolic Panel    Collection Time: 01/09/17  5:37 AM   Result Value Ref Range    Glucose 73 65 - 99 mg/dL    BUN 6 6 - 20 mg/dL    Creatinine 0.66 (L) 0.76 - 1.27 mg/dL    Sodium 139 136 - 145 mmol/L    Potassium 3.4 (L) 3.5 - 5.2 mmol/L    Chloride 101 98 - 107 mmol/L    CO2 20.4 (L) 22.0 - 29.0 mmol/L    Calcium 8.7 8.6 - 10.5 mg/dL    eGFR  African Amer >150 >60 mL/min/1.73    eGFR Non African Amer 125 >60 mL/min/1.73    BUN/Creatinine Ratio 9.1 7.0 - 25.0    Anion Gap 17.6 mmol/L   Magnesium    Collection Time: 01/09/17  5:37 AM   Result Value Ref Range    Magnesium 1.7 1.6 - 2.6 mg/dL   BNP    Collection Time: 01/09/17  5:37 AM   Result Value Ref Range    proBNP 947.4 (H) 5.0 - 900.0 pg/mL   CBC Auto Differential    Collection Time: 01/09/17  5:37 AM   Result Value Ref Range    WBC 9.06 4.50 - 10.70 10*3/mm3    RBC 4.79 4.60 - 6.00 10*6/mm3    Hemoglobin 12.4 (L) 13.7 - 17.6 g/dL    Hematocrit 40.2 (L) 40.4 - 52.2 %    MCV 83.9 79.8 - 96.2 fL    MCH 25.9 (L) 27.0 - 32.7 pg    MCHC 30.8 (L) 32.6 - 36.4 g/dL    RDW 14.7 (H) 11.5 - 14.5 %    RDW-SD 45.2 37.0 - 54.0 fl    MPV 10.1 6.0 - 12.0 fL    Platelets 189 140 - 500 10*3/mm3    Neutrophil % 62.5 42.7 - 76.0 %    Lymphocyte % 26.8 19.6 - 45.3 %    Monocyte % 7.9 5.0 - 12.0 %    Eosinophil % 2.3 0.3 - 6.2 %    Basophil % 0.3 0.0 - 1.5 %    Immature Grans % 0.2 0.0 - 0.5 %    Neutrophils, Absolute 5.65 1.90 - 8.10 10*3/mm3    Lymphocytes, Absolute 2.43 0.90 - 4.80 10*3/mm3    Monocytes,  Absolute 0.72 0.20 - 1.20 10*3/mm3    Eosinophils, Absolute 0.21 0.00 - 0.70 10*3/mm3    Basophils, Absolute 0.03 0.00 - 0.20 10*3/mm3    Immature Grans, Absolute 0.02 0.00 - 0.03 10*3/mm3    [unfilled]     Assessment:  Active Problems:    UTI (urinary tract infection), bacterial  UTI, ID following, no obstructive uropathy, Cr good.    Plan:  No intervention required for renal calculi at this time. Would suggest kub in 6 mo to monitor and will be happy to see him as outpt. Call for questions.    Eduin Brennan MD  1/9/2017  5:44 PM

## 2017-01-09 NOTE — PLAN OF CARE
Problem: Patient Care Overview (Adult)  Goal: Plan of Care Review  Outcome: Ongoing (interventions implemented as appropriate)    01/09/17 1528   Coping/Psychosocial Response Interventions   Plan Of Care Reviewed With patient         Problem: Inpatient SLP  Goal: Dysphagia- Patient will safely consume diet as per recommendation with no signs/symptoms of aspiration  Outcome: Ongoing (interventions implemented as appropriate)

## 2017-01-10 ENCOUNTER — APPOINTMENT (OUTPATIENT)
Dept: GENERAL RADIOLOGY | Facility: HOSPITAL | Age: 57
End: 2017-01-10
Attending: INTERNAL MEDICINE

## 2017-01-10 LAB
ANION GAP SERPL CALCULATED.3IONS-SCNC: 16.7 MMOL/L
BACTERIA SPEC AEROBE CULT: ABNORMAL
BUN BLD-MCNC: 5 MG/DL (ref 6–20)
BUN/CREAT SERPL: 6.9 (ref 7–25)
CALCIUM SPEC-SCNC: 8.5 MG/DL (ref 8.6–10.5)
CHLORIDE SERPL-SCNC: 99 MMOL/L (ref 98–107)
CO2 SERPL-SCNC: 19.3 MMOL/L (ref 22–29)
CREAT BLD-MCNC: 0.66 MG/DL (ref 0.76–1.27)
CREAT BLD-MCNC: 0.72 MG/DL (ref 0.76–1.27)
DEPRECATED RDW RBC AUTO: 43.6 FL (ref 37–54)
ERYTHROCYTE [DISTWIDTH] IN BLOOD BY AUTOMATED COUNT: 14.6 % (ref 11.5–14.5)
GFR SERPL CREATININE-BSD FRML MDRD: 113 ML/MIN/1.73
GFR SERPL CREATININE-BSD FRML MDRD: 125 ML/MIN/1.73
GFR SERPL CREATININE-BSD FRML MDRD: 137 ML/MIN/1.73
GFR SERPL CREATININE-BSD FRML MDRD: >150 ML/MIN/1.73
GLUCOSE BLD-MCNC: 176 MG/DL (ref 65–99)
GLUCOSE BLDC GLUCOMTR-MCNC: 156 MG/DL (ref 70–130)
GRAM STN SPEC: ABNORMAL
HCT VFR BLD AUTO: 39.8 % (ref 40.4–52.2)
HGB BLD-MCNC: 12.6 G/DL (ref 13.7–17.6)
INR PPP: 2.55 (ref 0.9–1.1)
LEVETIRACETAM SERPL-MCNC: 10.6 UG/ML (ref 10–40)
MCH RBC QN AUTO: 25.9 PG (ref 27–32.7)
MCHC RBC AUTO-ENTMCNC: 31.7 G/DL (ref 32.6–36.4)
MCV RBC AUTO: 81.9 FL (ref 79.8–96.2)
NT-PROBNP SERPL-MCNC: 698.6 PG/ML (ref 5–900)
PHENYTOIN FREE SERPL-MCNC: 1.6 UG/ML (ref 1–2)
PHENYTOIN SERPL-MCNC: 10.5 UG/ML (ref 10–20)
PLATELET # BLD AUTO: 221 10*3/MM3 (ref 140–500)
PMV BLD AUTO: 10.2 FL (ref 6–12)
POTASSIUM BLD-SCNC: 3.3 MMOL/L (ref 3.5–5.2)
POTASSIUM BLD-SCNC: 4 MMOL/L (ref 3.5–5.2)
PROTHROMBIN TIME: 26.6 SECONDS (ref 11.7–14.2)
RBC # BLD AUTO: 4.86 10*6/MM3 (ref 4.6–6)
SODIUM BLD-SCNC: 135 MMOL/L (ref 136–145)
WBC NRBC COR # BLD: 6.91 10*3/MM3 (ref 4.5–10.7)

## 2017-01-10 PROCEDURE — 99231 SBSQ HOSP IP/OBS SF/LOW 25: CPT | Performed by: FAMILY MEDICINE

## 2017-01-10 PROCEDURE — 25010000002 HYDRALAZINE PER 20 MG: Performed by: NURSE PRACTITIONER

## 2017-01-10 PROCEDURE — 80048 BASIC METABOLIC PNL TOTAL CA: CPT | Performed by: FAMILY MEDICINE

## 2017-01-10 PROCEDURE — 99232 SBSQ HOSP IP/OBS MODERATE 35: CPT | Performed by: INTERNAL MEDICINE

## 2017-01-10 PROCEDURE — 70110 X-RAY EXAM OF JAW 4/> VIEWS: CPT

## 2017-01-10 PROCEDURE — 25010000002 ENOXAPARIN PER 10 MG: Performed by: NURSE PRACTITIONER

## 2017-01-10 PROCEDURE — 25010000002 PIPERACILLIN SOD-TAZOBACTAM PER 1 G: Performed by: INTERNAL MEDICINE

## 2017-01-10 PROCEDURE — 25010000002 LEVETIRACETAM IN NACL 0.82% 500 MG/100ML SOLUTION: Performed by: INTERNAL MEDICINE

## 2017-01-10 PROCEDURE — 85610 PROTHROMBIN TIME: CPT | Performed by: INTERNAL MEDICINE

## 2017-01-10 PROCEDURE — 83880 ASSAY OF NATRIURETIC PEPTIDE: CPT | Performed by: FAMILY MEDICINE

## 2017-01-10 PROCEDURE — 63710000001 PROMETHAZINE PER 25 MG: Performed by: INTERNAL MEDICINE

## 2017-01-10 PROCEDURE — 82565 ASSAY OF CREATININE: CPT | Performed by: INTERNAL MEDICINE

## 2017-01-10 PROCEDURE — 82962 GLUCOSE BLOOD TEST: CPT

## 2017-01-10 PROCEDURE — 84132 ASSAY OF SERUM POTASSIUM: CPT | Performed by: FAMILY MEDICINE

## 2017-01-10 PROCEDURE — 85027 COMPLETE CBC AUTOMATED: CPT | Performed by: INTERNAL MEDICINE

## 2017-01-10 RX ORDER — POTASSIUM CHLORIDE 7.45 MG/ML
10 INJECTION INTRAVENOUS
Status: DISCONTINUED | OUTPATIENT
Start: 2017-01-10 | End: 2017-01-13 | Stop reason: HOSPADM

## 2017-01-10 RX ORDER — POTASSIUM CHLORIDE 750 MG/1
40 CAPSULE, EXTENDED RELEASE ORAL AS NEEDED
Status: DISCONTINUED | OUTPATIENT
Start: 2017-01-10 | End: 2017-01-13 | Stop reason: HOSPADM

## 2017-01-10 RX ORDER — POTASSIUM CHLORIDE 1.5 G/1.77G
40 POWDER, FOR SOLUTION ORAL AS NEEDED
Status: DISCONTINUED | OUTPATIENT
Start: 2017-01-10 | End: 2017-01-13 | Stop reason: HOSPADM

## 2017-01-10 RX ORDER — AMLODIPINE BESYLATE 5 MG/1
5 TABLET ORAL
Status: DISCONTINUED | OUTPATIENT
Start: 2017-01-10 | End: 2017-01-13 | Stop reason: HOSPADM

## 2017-01-10 RX ADMIN — METOPROLOL TARTRATE 25 MG: 25 TABLET ORAL at 18:26

## 2017-01-10 RX ADMIN — POTASSIUM CHLORIDE 40 MEQ: 750 CAPSULE, EXTENDED RELEASE ORAL at 12:40

## 2017-01-10 RX ADMIN — DOCUSATE SODIUM 100 MG: 100 CAPSULE, LIQUID FILLED ORAL at 08:30

## 2017-01-10 RX ADMIN — HYDRALAZINE HYDROCHLORIDE 20 MG: 20 INJECTION INTRAMUSCULAR; INTRAVENOUS at 04:02

## 2017-01-10 RX ADMIN — ESCITALOPRAM 10 MG: 10 TABLET, FILM COATED ORAL at 08:29

## 2017-01-10 RX ADMIN — TAZOBACTAM SODIUM AND PIPERACILLIN SODIUM 3.38 G: 375; 3 INJECTION, SOLUTION INTRAVENOUS at 08:29

## 2017-01-10 RX ADMIN — DOCUSATE SODIUM 100 MG: 100 CAPSULE, LIQUID FILLED ORAL at 17:17

## 2017-01-10 RX ADMIN — SODIUM CHLORIDE 100 ML/HR: 4.5 INJECTION, SOLUTION INTRAVENOUS at 05:48

## 2017-01-10 RX ADMIN — SODIUM CHLORIDE 150 MG PE: 9 INJECTION, SOLUTION INTRAVENOUS at 08:29

## 2017-01-10 RX ADMIN — LEVETIRACETAM 500 MG: 5 INJECTION INTRAVENOUS at 08:29

## 2017-01-10 RX ADMIN — ENOXAPARIN SODIUM 80 MG: 80 INJECTION SUBCUTANEOUS at 09:40

## 2017-01-10 RX ADMIN — AMLODIPINE BESYLATE 5 MG: 5 TABLET ORAL at 18:26

## 2017-01-10 RX ADMIN — METOPROLOL TARTRATE 2.5 MG: 5 INJECTION INTRAVENOUS at 08:30

## 2017-01-10 RX ADMIN — Medication 250 MG: at 08:29

## 2017-01-10 RX ADMIN — SODIUM CHLORIDE 150 MG PE: 9 INJECTION, SOLUTION INTRAVENOUS at 01:17

## 2017-01-10 RX ADMIN — TAZOBACTAM SODIUM AND PIPERACILLIN SODIUM 3.38 G: 375; 3 INJECTION, SOLUTION INTRAVENOUS at 16:33

## 2017-01-10 RX ADMIN — OSELTAMIVIR PHOSPHATE 75 MG: 75 CAPSULE ORAL at 08:30

## 2017-01-10 RX ADMIN — WARFARIN SODIUM 3 MG: 3 TABLET ORAL at 17:17

## 2017-01-10 RX ADMIN — MINERAL SUPPLEMENT IRON 300 MG / 5 ML STRENGTH LIQUID 100 PER BOX UNFLAVORED 325 MG: at 08:29

## 2017-01-10 RX ADMIN — SODIUM CHLORIDE 150 MG PE: 9 INJECTION, SOLUTION INTRAVENOUS at 16:33

## 2017-01-10 RX ADMIN — Medication 250 MG: at 17:17

## 2017-01-10 RX ADMIN — OSELTAMIVIR PHOSPHATE 75 MG: 75 CAPSULE ORAL at 20:31

## 2017-01-10 RX ADMIN — LEVETIRACETAM 500 MG: 5 INJECTION INTRAVENOUS at 20:31

## 2017-01-10 RX ADMIN — HYDRALAZINE HYDROCHLORIDE 20 MG: 20 INJECTION INTRAMUSCULAR; INTRAVENOUS at 12:45

## 2017-01-10 RX ADMIN — PROMETHAZINE HYDROCHLORIDE 12.5 MG: 25 TABLET ORAL at 14:28

## 2017-01-10 RX ADMIN — SODIUM CHLORIDE 100 ML/HR: 4.5 INJECTION, SOLUTION INTRAVENOUS at 15:29

## 2017-01-10 RX ADMIN — DOCUSATE SODIUM -SENNOSIDES 2 TABLET: 50; 8.6 TABLET, COATED ORAL at 20:31

## 2017-01-10 RX ADMIN — POTASSIUM CHLORIDE 40 MEQ: 750 CAPSULE, EXTENDED RELEASE ORAL at 17:17

## 2017-01-10 NOTE — PLAN OF CARE
Problem: Patient Care Overview (Adult)  Goal: Plan of Care Review  Outcome: Ongoing (interventions implemented as appropriate)    01/10/17 1022   Coping/Psychosocial Response Interventions   Plan Of Care Reviewed With patient   Patient Care Overview   Progress improving   Outcome Evaluation   Outcome Summary/Follow up Plan Pt VSS. Tolerating diet. IV antibiotics and anti-seizure medications continued. Q2 turns. Pt very alert this morning. If K comes back low from BMP, will place on K protocol per Dr. Cuba. Continue current treatments. Will continue to monitor.         Problem: Sepsis (Adult)  Goal: Signs and Symptoms of Listed Potential Problems Will be Absent or Manageable (Sepsis)  Outcome: Ongoing (interventions implemented as appropriate)    Problem: Fall Risk (Adult)  Goal: Absence of Falls  Outcome: Ongoing (interventions implemented as appropriate)    Problem: Dysphagia (Adult)  Goal: Identify Related Risk Factors and Signs and Symptoms  Outcome: Ongoing (interventions implemented as appropriate)  Goal: Functional/Safe Swallow  Outcome: Ongoing (interventions implemented as appropriate)  Goal: Compensatory Techniques to Improve Safety/Function with Swallowing  Outcome: Ongoing (interventions implemented as appropriate)

## 2017-01-10 NOTE — PROGRESS NOTES
Servando Kapadia  56 y.o.   LOS: 3 days   Patient Care Team:  Dylan Cuba MD as PCP - General (Family Medicine)    Chief Complaint:  UTGI, Flu A  I have reviewed the patient's medical history in detail and updated the computerized patient record.    Subjective   2  Patient Complaints: none    History taken from: patient chart RN    Interval History:  Passed swallow study. Could say my name.  Review of Systems:   na    Objective     Lab Results (last 24 hours)     Procedure Component Value Units Date/Time    Blood Culture [[id number]]  (Normal) Collected:  01/08/17 0918    Specimen:  Blood from Arm, Right Updated:  01/09/17 1001     Blood Culture No growth at 24 hours     Blood Culture [[id number]]  (Normal) Collected:  01/08/17 0956    Specimen:  Blood from Hand, Left Updated:  01/09/17 1001     Blood Culture No growth at 24 hours     POC Glucose Fingerstick [[id number]]  (Normal) Collected:  01/09/17 2040    Specimen:  Blood Updated:  01/09/17 2048     Glucose 126 mg/dL     Narrative:       Meter: [id number] : [id number] Samuel SIMMONS    CBC (No Diff) [[id number]]  (Abnormal) Collected:  01/10/17 0359    Specimen:  Blood Updated:  01/10/17 0503     WBC 6.91 10*3/mm3      RBC 4.86 10*6/mm3      Hemoglobin 12.6 (L) g/dL      Hematocrit 39.8 (L) %      MCV 81.9 fL      MCH 25.9 (L) pg      MCHC 31.7 (L) g/dL      RDW 14.6 (H) %      RDW-SD 43.6 fl      MPV 10.2 fL      Platelets 221 10*3/mm3     Protime-INR [[id number]]  (Abnormal) Collected:  01/10/17 0359    Specimen:  Blood Updated:  01/10/17 0510     Protime 26.6 (H) Seconds      INR 2.55 (H)     Creatinine, Serum [[id number]]  (Abnormal) Collected:  01/10/17 0359    Specimen:  Blood Updated:  01/10/17 0527     Creatinine 0.66 (L) mg/dL      eGFR Non African Amer 125 mL/min/1.73      eGFR  African Amer >150 mL/min/1.73     Blood Culture [[id number]]  (Abnormal)  (Susceptibility) Collected:  01/07/17 0032    Specimen:  Blood from Arm, Left Updated:  01/10/17 0734      "Blood Culture Streptococcus salivarius ssp salivarius (A)      Gram Stain Result        Anaerobic Bottle Gram positive cocci in chains    Susceptibility      Streptococcus salivarius ssp salivarius     JANETH     Ceftriaxone 0.25 ug/ml Susceptible     Clindamycin >=1 ug/ml Resistant     Erythromycin >=8 ug/ml Resistant     Levofloxacin 4 ug/ml Intermediate     Penicillin G 0.25 ug/ml Intermediate     Vancomycin 0.5 ug/ml Susceptible                          Vital Signs  Temp:  [98 °F (36.7 °C)-98.6 °F (37 °C)] 98.6 °F (37 °C)  Heart Rate:  [76-97] 97  Resp:  [16-20] 18  BP: (131-184)/() 152/84    Flowsheet Rows         First Filed Value    Admission Height  64.5\" (163.8 cm) Documented at 01/06/2017 2354    Admission Weight  173 lb 1 oz (78.5 kg) Documented at 01/06/2017 2354            Intake/Output Summary (Last 24 hours) at 01/10/17 0948  Last data filed at 01/10/17 0837   Gross per 24 hour   Intake   3140 ml   Output      0 ml   Net   3140 ml     Physical Exam:  General Appearance alert, appears stated age and cooperative  Lungs clear to auscultation, respirations regular, respirations even and respirations unlabored  Heart regular rhythm & normal rate, normal S1, S2, no murmur, no nova, no rub and no click  Abdomen normal bowel sounds, no masses, no hepatomegaly, no splenomegaly, soft non-tender, no guarding and no rebound tenderness  Neurologic Mental Status orientated to person, place, time and situation     Results Review:    I reviewed the patient's new clinical results.    Medication Review:   Review of patient's allergies indicates no known allergies.  Current Facility-Administered Medications   Medication Dose Route Frequency Provider Last Rate Last Dose   • acetaminophen (TYLENOL) tablet 650 mg  650 mg Oral Q6H PRN Bogdan Palomares MD       • acetaminophen (TYLENOL) tablet 650 mg  650 mg Oral Q4H PRN Bogdan Palomares MD       • bisacodyl (DULCOLAX) suppository 10 mg  10 mg Rectal Daily PRN Bogdan FINN" MD Jelani       • dextrose (GLUTOSE) oral gel 15 g  15 g Oral PRN Bogdan Palomares MD       • docusate sodium (COLACE) capsule 100 mg  100 mg Oral BID Bogdan Palomares MD   100 mg at 01/10/17 0830   • enoxaparin (LOVENOX) syringe 80 mg  1 mg/kg Subcutaneous Q12H Daphne Medina APRN   80 mg at 01/10/17 0940   • escitalopram (LEXAPRO) tablet 10 mg  10 mg Oral Daily Bogdan Palomares MD   10 mg at 01/10/17 0829   • ferrous sulfate 300 (60 FE) MG/5ML syrup 325 mg  325 mg Oral Daily Bogdan Palomares MD   325 mg at 01/10/17 0829   • fosphenytoin (Cerebyx) 150 mg PE in sodium chloride 0.9 % 100 mL IVPB  150 mg PE Intravenous Q8H Wilfrid Cobb MD   150 mg PE at 01/10/17 0829   • hydrALAZINE (APRESOLINE) injection 20 mg  20 mg Intravenous Q6H PRN Daphne Medina APRN   20 mg at 01/10/17 0402   • levETIRAcetam in NaCl 0.82% (KEPPRA) IVPB 500 mg  500 mg Intravenous Q12H Bogdan Palomares MD   500 mg at 01/10/17 0829   • magnesium hydroxide (MILK OF MAGNESIA) 400 MG/5ML suspension 30 mL  30 mL Oral Daily PRN Bogdan Palomares MD       • magnesium sulfate in D5W 1g/100mL (PREMIX) IVPB 1 g  1 g Intravenous BID PRN Bogdan Palomares MD   1 g at 01/08/17 1436   • metoprolol (LOPRESSOR) injection 2.5 mg  2.5 mg Intravenous Q12H Daphne Medina APRN   2.5 mg at 01/10/17 0830   • Morphine sulfate (PF) injection 1 mg  1 mg Intravenous Q4H PRN Bogdan Palomares MD        And   • naloxone (NARCAN) injection 0.4 mg  0.4 mg Intravenous Q5 Min PRN Bogdan Palomares MD       • nitroglycerin (NITROSTAT) SL tablet 0.4 mg  0.4 mg Sublingual Q5 Min PRN Bogdan Palomares MD       • oseltamivir (TAMIFLU) capsule 75 mg  75 mg Oral Q12H Bogdan Palomares MD   75 mg at 01/10/17 0830   • Pharmacy Consult- Convert Oral Keppra to IV Route   Does not apply Continuous PRN Bogdan Palomares MD       • Pharmacy Consult- Convert Oral Phenytoin to IV route   Does not apply Continuous PRN Bogdan Palomares MD       • piperacillin-tazobactam (ZOSYN) 3.375 g in dextrose 50 mL  IVPB (premix)  3.375 g Intravenous Q8H Ramón Moss MD   3.375 g at 01/10/17 0829   • promethazine (PHENERGAN) tablet 12.5 mg  12.5 mg Oral Q6H PRN Bogdan Palomares MD       • saccharomyces boulardii (FLORASTOR) capsule 250 mg  250 mg Oral BID Ramón Moss MD   250 mg at 01/10/17 0829   • sennosides-docusate sodium (SENOKOT-S) 8.6-50 MG tablet 2 tablet  2 tablet Oral Nightly Bogdan Palomares MD   2 tablet at 01/09/17 2112   • sodium chloride 0.45 % infusion  100 mL/hr Intravenous Continuous Bogdan Palomares  mL/hr at 01/10/17 0600 100 mL/hr at 01/10/17 0600   • sodium chloride 0.9 % flush 1-10 mL  1-10 mL Intravenous PRN Bogdan Palomares MD       • sodium chloride 0.9 % flush 10 mL  10 mL Intravenous PRN Fran Schrader MD       • warfarin (COUMADIN) tablet 3 mg  3 mg Oral Daily MARY Ayon   3 mg at 01/09/17 1803     Prescriptions Prior to Admission   Medication Sig Dispense Refill Last Dose   • acetaminophen (TYLENOL) 325 MG tablet 650 mg by Enteral route every 6 (six) hours as needed (for pain/elevated temperature). GIVEN PER FEEDING TUBE   not needed   • bisacodyl (DULCOLAX) 10 MG suppository Insert 10 mg into the rectum daily as needed for constipation.   not needed   • dextrose (GLUTOSE) 40 % gel Take 15 g by mouth as needed for low blood sugar (may give for BS<60 & symptomatic).   not needed   • escitalopram (LEXAPRO) 10 MG tablet 10 mg by Enteral route daily. GIVEN PER FEEDING TUBE   6/7/2016 at 2100   • ferrous sulfate 300 (60 FE) MG/5ML syrup 325 mg by Enteral route daily. PER FEEDING TUBE   6/7/2016 at 2100   • glucagon (GLUCAGEN) 1 MG injection Inject 1 mg into the shoulder, thigh, or buttocks 1 (one) time as needed for low blood sugar (give for BS<60 & symptomatic, may repeat x2 with 10mins apart).   not needed   • levETIRAcetam (KEPPRA) 500 MG tablet 500 mg by Enteral route 2 (two) times a day. GIVEN AT 0900 & 2100 PER FEEDING TUBE   6/7/2016 at 2100   •  magnesium hydroxide (MILK OF MAGNESIA) 400 MG/5ML suspension 30 mL by Enteral route daily as needed for constipation. PER FEEDING TUBE   not needed   • metFORMIN (GLUCOPHAGE) 1000 MG tablet 1,000 mg by Enteral route 2 (two) times a day with meals. given at 0730 & 1630 PER FEEDING TUBE   6/7/2016 at 1630   • phenytoin (DILANTIN) 125 MG/5ML suspension Take 300 mg by mouth Every Night.      • sennosides-docusate sodium (SENOKOT-S) 8.6-50 MG tablet 2 tablets by Enteral route every night. GIVEN PER FEEDING TUBE   6/7/2016 at 2100   • simvastatin (ZOCOR) 10 MG tablet 10 mg by Enteral route every night. GIVEN PER FEEDING TUBE   6/7/2016 at 2100   • warfarin (COUMADIN) 4 MG tablet Take 4 mg by mouth Daily.      • phenytoin (DILANTIN) 100 MG ER capsule 200 mg by Enteral route every night. GIVEN PER FEEDING TUBE   6/7/2016 at 2100   • warfarin (COUMADIN) 3 MG tablet 4 mg by Enteral route daily. Give 1 tablet by mouth at bedtime related to unspecified atrial fibrillation PER FEEDING TUBE  HOLDING FOR SURGERY   6/3/2016       Assessment:    Active Problems:    UTI (urinary tract infection), bacterial  Flu A        Plan:Continue current treatment plans .    Dylan Cuba MD  01/10/17  9:48 AM

## 2017-01-10 NOTE — PROGRESS NOTES
[facility]  Cardiology Progress Note    Patient Identification:  Name: Servando Kapadia  Age: 56 y.o.  Sex: male  :  1960  MRN: [id number]                 Follow Up / Chief Complaint: Paroxysmal A. fib, anticoagulation    Interval History:  56-year-old male with history of paroxysmal A. fib on chronic anticoagulation who presented from nursing facility following a seizure with fever of 102, uncontrolled hypertension, influenza A, UTI and possible bacteremia. He had a brief episode of paroxysmal A. fib with RVR last evening in the setting of magnesium level I.2.      Subjective:  Denies chest pain, palpitations or shortness of breath    Objective:  Maintaining SR without further arrhythmia or ectopy.  Blood pressure better, but diastolic remains 80-90's      Past Medical History:  Past Medical History   Diagnosis Date   • Anemia    • Atrial fibrillation    • Atrial fibrillation    • Benign prostatic hyperplasia with lower urinary tract symptoms    • Cerebral infarct    • Chronic obstructive pyelonephritis    • Constipation    • Depression    • Diabetes mellitus      type 2   • Enlarged prostate    • Hemiplegia and hemiparesis    • Hyperlipidemia    • Hypertension    • Kidney stone    • Seizures    • Sleep apnea    • Stroke      apparent right side residual weakness   • Urinary incontinence      Past Surgical History:  Past Surgical History   Procedure Laterality Date   • Cystoscopy w/ ureteral stent placement N/A 5/3/2016     Procedure: CYSTOSCOPY LASER LITHOTRIPSY LEFT RETROGRADE URETERAL CATHETER AND STENT PLACEMENT;  Surgeon: Eduin Brennan MD;  Location: [facility];  Service:    • Endoscopy w/ peg tube placement N/A 2016     Procedure: ESOPHAGOGASTRODUODENOSCOPY WITH PERCUTANEOUS ENDOSCOPIC GASTROSTOMY TUBE INSERTION;  Surgeon: Lion Weber MD;  Location: [facility] ENDOSCOPY;  Service:    • Ureteroscopy laser lithotripsy with stent insertion N/A 2016     Procedure:  URETEROSCOPY LASER LITHOTRIPSY STONE EXTRACTION WITH JJ STENT INSERTION;  Surgeon: Eduin Brennan MD;  Location: [facility] OR;  Service:         Social History:   Social History   Substance Use Topics   • Smoking status: Never Smoker   • Smokeless tobacco: Never Used      Comment: pt unable to answer questions   • Alcohol use No      Family History:  History reviewed. No pertinent family history.       Allergies:  No Known Allergies  Scheduled Meds:    docusate sodium 100 mg BID   enoxaparin 1 mg/kg Q12H   escitalopram 10 mg Daily   ferrous sulfate 325 mg Daily   fosphenytoin 150 mg PE Q8H   levETIRAcetam 500 mg Q12H   metoprolol 2.5 mg Q12H   oseltamivir 75 mg Q12H   piperacillin-tazobactam 3.375 g Q8H   saccharomyces boulardii 250 mg BID   sennosides-docusate sodium 2 tablet Nightly   warfarin 3 mg Daily           INTAKE AND OUTPUT:    Intake/Output Summary (Last 24 hours) at 01/10/17 1659  Last data filed at 01/10/17 1633   Gross per 24 hour   Intake   3640 ml   Output      0 ml   Net   3640 ml       Review of Systems:   GI:  Diet advanced. No n/v  Cardiac:  No further ectopy  Pulmonary:  No increased work of breathing    Constitutional:  Temp:  [98 °F (36.7 °C)-98.6 °F (37 °C)] 98.6 °F (37 °C)  Heart Rate:  [82-97] 92  Resp:  [16-20] 17  BP: (131-152)/(84-99) 151/98    Physical Exam by Jairo Crum MD  General: Sleeping, arouses to name Appears in no acute distress  Eyes: PRTL,  HEENT:  Thyroid not visibly enlarged. Oral mucosa dry. No cyanosis (mouth breather)  Respiratory: Respirations regular and unlabored at rest.  Slightly diminished air entry in bases.  No crackles or wheezes auscultated  Cardiovascular: S1S2 Regular rate and rhythm. No murmur. No pretibial pitting edema with TEDs  Gastrointestinal: Abdomen soft, non tender. Bowel sounds present.   Musculoskeletal: CHRISTIANSON x4. No abnormal movements  Extremities: No digital cyanosis  Skin: Skin warm and dry to touch.   Neuro: AAO x1-2   Psych:  [facility]          Cardiographics  Telemetry: SR 90's, no ectopy        ECG 1-9-2017:       Echocardiogram:   · TDS , SUBOPTIMAL STUDY  · All left ventricular wall segments contract normally.  · Left Ventricle: Calculated EF = 59.4%  · There is no evidence of pericardial effusion.  · BUBBLE STUDY NEG FOR PFO      Results from last 7 days  Lab Units 01/10/17  1009   SODIUM mmol/L 135*   POTASSIUM mmol/L 3.3*   BUN mg/dL 5*   CREATININE mg/dL 0.72*   CALCIUM mg/dL 8.5*       Results from last 7 days  Lab Units 01/10/17  0359 01/09/17  0537 01/08/17  0327   WBC 10*3/mm3 6.91 9.06 12.18*   HEMOGLOBIN g/dL 12.6* 12.4* 12.3*   HEMATOCRIT % 39.8* 40.2* 40.3*   PLATELETS 10*3/mm3 221 189 180       Results from last 7 days  Lab Units 01/10/17  0359 01/09/17  0537 01/08/17  0327   INR  2.55* 1.90* 2.06*         Assessment:  - PAF rvr    - s/p Hypomagnesia  - h/o PAF  - elevated [id number] score -> on Coumadin for INR therapeutic  - HTN  - Dyslipidemia  - eColi UTI -> ID  - Strep bacteremia -> ID  - Seizure -> Neurology  - Febrile illness -> ID  - Influenza A -> ID  - h/o CVA -> Neurology  - h/o seizure disorder -> Neurology       Plan:  - PAF rvr . No recurrence.  No MI  TSH normal.  Magnesium replaced per protocol. Continue beta blocker    - s/p hypomagnesia - magnesium level stable at I.7 after replacement per protocol    - h/o PAF - elevated [id number] score ->   INR 2.55. Dose Coumadin to keep INR 2.0-3.0 range    - HTN -  Start po beta blocker and CCB      Start Lopressor and Norvasc since he is taking po. Dose Coumadin to maintain INR 2.0-3.0 range. Recheck INR in am. No further PAF. No angina. No MI. No further ischemic work up planned at this time      I reviewed the patient's new clinical results and treatment plan with Dr Crum. I personally viewed and interpreted the patient's EKG/Telemetry data    )1/10/2017  MD YANCI Nixon/Transcription disclaimer:   Much of this  encounter note is an electronic transcription/translation of spoken language to printed text. The electronic translation of spoken language may permit erroneous, or at times, nonsensical words or phrases to be inadvertently transcribed; Although I have reviewed the note for such errors, some may still exist.

## 2017-01-10 NOTE — PLAN OF CARE
Problem: Patient Care Overview (Adult)  Goal: Plan of Care Review  Outcome: Ongoing (interventions implemented as appropriate)    01/09/17 1940   Coping/Psychosocial Response Interventions   Plan Of Care Reviewed With patient   Patient Care Overview   Progress improving   Outcome Evaluation   Outcome Summary/Follow up Plan Passed swallow study, started on po meds and diet Alert and oriented X 3 this afternoon. Will continue to monitor,         Problem: Sepsis (Adult)  Goal: Signs and Symptoms of Listed Potential Problems Will be Absent or Manageable (Sepsis)  Outcome: Ongoing (interventions implemented as appropriate)    01/09/17 1940   Sepsis   Problems Assessed (Sepsis) infection progression;situational response   Problems Present (Sepsis) situational response         Problem: Fall Risk (Adult)  Goal: Absence of Falls  Outcome: Ongoing (interventions implemented as appropriate)    01/09/17 1940   Fall Risk (Adult)   Absence of Falls making progress toward outcome         Problem: Dysphagia (Adult)  Goal: Identify Related Risk Factors and Signs and Symptoms  Outcome: Ongoing (interventions implemented as appropriate)    01/09/17 1940   Dysphagia   Dysphagia: Related Risk Factors neurological impairment       Goal: Functional/Safe Swallow  Outcome: Ongoing (interventions implemented as appropriate)  Goal: Compensatory Techniques to Improve Safety/Function with Swallowing  Outcome: Ongoing (interventions implemented as appropriate)

## 2017-01-10 NOTE — PROGRESS NOTES
LOS: 3 days     Chief Complaint:  Follow-up flu    Interval History:  No acute events. He is afebrile and WBC normalized. He denies any pain. He denies any current urinary symptoms or shortness of air. He is tolerating Zosyn w/o rash or diarrhea.    ROS: no n/v/d    Vital Signs  Temp:  [98 °F (36.7 °C)-98.6 °F (37 °C)] 98.6 °F (37 °C)  Heart Rate:  [76-97] 97  Resp:  [16-20] 18  BP: (131-184)/() 152/84    Physical Exam:  General: awake and alert today  Head: round facies  Eyes: PERRL, EOMI, no scleral icterus  ENT: MM dry, OP clear, no thrush. Poor dentition.    Neck: Suppl  Cardiovascular: NR, RR, no murmurs, rubs; no LE edema  Respiratory: normal work of breathing on ambient air  GI: Abdomen is obese, soft, non-tender, non-distended  : no Beyer catheter present  Musculoskeletal: no joint abnormalities, normal musculature  Skin: No rashes, lesions, or embolic phenomenon  Neurological: 5/5 UE strength  Psychiatric: Alert and oriented x 2  Vasc: no cyanosis; PIV w/o erythema    Antibiotics:  •  oseltamivir (TAMIFLU) capsule 75 mg, 75 mg, Oral, Q12H, Bogdan Palomares MD, 75 mg at 01/07/17 1026  •  piperacillin-tazobactam (ZOSYN) 3.375 g in dextrose 50 mL IVPB (premix), 3.375 g, Intravenous, Q8H, Ramón Moss MD    LABS:  CBC, Crt, micro reviewed today  Lab Results   Component Value Date    WBC 6.91 01/10/2017    HGB 12.6 (L) 01/10/2017    HCT 39.8 (L) 01/10/2017    MCV 81.9 01/10/2017     01/10/2017     Lab Results   Component Value Date    CREATININE 0.66 (L) 01/10/2017     Flu A Ag +  Procal 10---->5  UA TNTC WBCs and RBCs  A1c 6.3%     Microbiology:  1/7 BCx: Strep salivarius and Coag-Neg Staph in the other set  1/7 UCx: >100k GNR  1/7 RVP: negative  1/8 BCx NGTD     Prior Radiology:  TTE negative TV vegetations  CT A/P with non-obstructing stones    Assessment/Plan   1. Sepsis due to Strep salivarius bacteremia  -continue Zosyn 3.375 g q8h for now  -this organisms grew in 1/2 sets;  TTE was negative for TV vegetation but poor visualization of the AV due to body habitus  -2nd blood cultures grew coag-negative Staph which is typically a contaminant; the repeat blood cultures did not grow this organism which is important information because he received no antibiotics that would have eradicated it  -obtain Panorex  -repeat blood cultures are NGTD  -I anticipate a 2 week course of antibiotics (STOP DATE 1/22/17)    2. Influenza A  -continue tamiflu 75 mg PO BID x 5 days     3. TME - stable. S/P neurology evaluation.     4. Cystitis and non-obstructing renal calculi- he can't tell me much in the way of urinary symptoms so will treat the E coli with a 7-day course that will also cover #1. CT with non-obstructing stones. No intervention needed.     Thank you for this consult. ID will follow.

## 2017-01-10 NOTE — PLAN OF CARE
Problem: Patient Care Overview (Adult)  Goal: Plan of Care Review  Outcome: Ongoing (interventions implemented as appropriate)    01/09/17 1940 01/10/17 0033   Coping/Psychosocial Response Interventions   Plan Of Care Reviewed With --  patient   Outcome Evaluation   Outcome Summary/Follow up Plan Passed swallow study, started on po meds and diet Alert and oriented X 3 this afternoon. Will continue to monitor, --        Goal: Adult Individualization and Mutuality  Outcome: Ongoing (interventions implemented as appropriate)  Goal: Discharge Needs Assessment  Outcome: Ongoing (interventions implemented as appropriate)    01/09/17 1236 01/10/17 0033   Discharge Needs Assessment   Concerns To Be Addressed --  no discharge needs identified   Concerns Comments --  will return to [facility] at discharge   Readmission Within The Last 30 Days --  no previous admission in last 30 days   Community Agency Name(S) [facility] - Called to Yael/Sylvester to check bed status and level of care.  --    Equipment Needed After Discharge --  none   Current Discharge Risk --  chronically ill;dependent with mobility/activities of daily living   Discharge Disposition --  still a patient   Living Environment   Transportation Available --  ambulance   Current Health   Outpatient/Agency/Support Group Needs long term acute care facility (specify) --    Anticipated Changes Related to Illness inability to care for self --    Self-Care   Equipment Currently Used at Home wheelchair;hospital bed --          Problem: Sepsis (Adult)  Goal: Signs and Symptoms of Listed Potential Problems Will be Absent or Manageable (Sepsis)  Outcome: Ongoing (interventions implemented as appropriate)    01/10/17 0033   Sepsis   Problems Assessed (Sepsis) infection progression;situational response   Problems Present (Sepsis) situational response         Problem: Fall Risk (Adult)  Goal: Absence of Falls  Outcome: Ongoing (interventions implemented as appropriate)     01/10/17 0033   Fall Risk (Adult)   Absence of Falls making progress toward outcome         Problem: Dysphagia (Adult)  Goal: Identify Related Risk Factors and Signs and Symptoms  Outcome: Ongoing (interventions implemented as appropriate)    01/10/17 0033   Dysphagia   Dysphagia: Related Risk Factors neurological impairment   General Observations Signs and Symptoms (Dysphagia) dietary habit changes;drooling       Goal: Functional/Safe Swallow  Outcome: Ongoing (interventions implemented as appropriate)    01/10/17 0033   Dysphagia (Adult)   Functional/Safe Swallow making progress toward outcome       Goal: Compensatory Techniques to Improve Safety/Function with Swallowing    01/10/17 0033   Dysphagia (Adult)   Compensatory Techniques to Improve Safety/Function with Swallowing making progress toward outcome

## 2017-01-11 LAB
CREAT BLD-MCNC: 0.67 MG/DL (ref 0.76–1.27)
DEPRECATED RDW RBC AUTO: 43.8 FL (ref 37–54)
ERYTHROCYTE [DISTWIDTH] IN BLOOD BY AUTOMATED COUNT: 14.8 % (ref 11.5–14.5)
GFR SERPL CREATININE-BSD FRML MDRD: 123 ML/MIN/1.73
GFR SERPL CREATININE-BSD FRML MDRD: 149 ML/MIN/1.73
GLUCOSE BLDC GLUCOMTR-MCNC: 119 MG/DL (ref 70–130)
GLUCOSE BLDC GLUCOMTR-MCNC: 134 MG/DL (ref 70–130)
GLUCOSE BLDC GLUCOMTR-MCNC: 168 MG/DL (ref 70–130)
HCT VFR BLD AUTO: 39.5 % (ref 40.4–52.2)
HGB BLD-MCNC: 12.4 G/DL (ref 13.7–17.6)
INR PPP: 2.87 (ref 0.9–1.1)
MCH RBC QN AUTO: 25.7 PG (ref 27–32.7)
MCHC RBC AUTO-ENTMCNC: 31.4 G/DL (ref 32.6–36.4)
MCV RBC AUTO: 81.8 FL (ref 79.8–96.2)
PLATELET # BLD AUTO: 237 10*3/MM3 (ref 140–500)
PMV BLD AUTO: 9.7 FL (ref 6–12)
PROTHROMBIN TIME: 29.1 SECONDS (ref 11.7–14.2)
RBC # BLD AUTO: 4.83 10*6/MM3 (ref 4.6–6)
WBC NRBC COR # BLD: 6.4 10*3/MM3 (ref 4.5–10.7)

## 2017-01-11 PROCEDURE — 85027 COMPLETE CBC AUTOMATED: CPT | Performed by: INTERNAL MEDICINE

## 2017-01-11 PROCEDURE — 99232 SBSQ HOSP IP/OBS MODERATE 35: CPT | Performed by: INTERNAL MEDICINE

## 2017-01-11 PROCEDURE — 99232 SBSQ HOSP IP/OBS MODERATE 35: CPT | Performed by: FAMILY MEDICINE

## 2017-01-11 PROCEDURE — 25010000002 PIPERACILLIN SOD-TAZOBACTAM PER 1 G: Performed by: INTERNAL MEDICINE

## 2017-01-11 PROCEDURE — 85610 PROTHROMBIN TIME: CPT | Performed by: INTERNAL MEDICINE

## 2017-01-11 PROCEDURE — 82565 ASSAY OF CREATININE: CPT | Performed by: INTERNAL MEDICINE

## 2017-01-11 PROCEDURE — 82962 GLUCOSE BLOOD TEST: CPT

## 2017-01-11 PROCEDURE — 25010000002 LEVETIRACETAM IN NACL 0.82% 500 MG/100ML SOLUTION: Performed by: INTERNAL MEDICINE

## 2017-01-11 RX ORDER — PHENYTOIN 125 MG/5ML
300 SUSPENSION ORAL NIGHTLY
Status: DISCONTINUED | OUTPATIENT
Start: 2017-01-11 | End: 2017-01-13 | Stop reason: HOSPADM

## 2017-01-11 RX ORDER — PHENYTOIN 125 MG/5ML
200 SUSPENSION ORAL DAILY
Status: DISCONTINUED | OUTPATIENT
Start: 2017-01-12 | End: 2017-01-13 | Stop reason: HOSPADM

## 2017-01-11 RX ORDER — OSELTAMIVIR PHOSPHATE 75 MG/1
75 CAPSULE ORAL EVERY 12 HOURS SCHEDULED
Status: COMPLETED | OUTPATIENT
Start: 2017-01-11 | End: 2017-01-13

## 2017-01-11 RX ORDER — LEVETIRACETAM 500 MG/1
500 TABLET ORAL EVERY 12 HOURS SCHEDULED
Status: DISCONTINUED | OUTPATIENT
Start: 2017-01-11 | End: 2017-01-13 | Stop reason: HOSPADM

## 2017-01-11 RX ADMIN — AMLODIPINE BESYLATE 5 MG: 5 TABLET ORAL at 08:21

## 2017-01-11 RX ADMIN — METOPROLOL TARTRATE 25 MG: 25 TABLET ORAL at 08:21

## 2017-01-11 RX ADMIN — OSELTAMIVIR PHOSPHATE 75 MG: 75 CAPSULE ORAL at 08:21

## 2017-01-11 RX ADMIN — WARFARIN SODIUM 3 MG: 3 TABLET ORAL at 17:17

## 2017-01-11 RX ADMIN — TAZOBACTAM SODIUM AND PIPERACILLIN SODIUM 3.38 G: 375; 3 INJECTION, SOLUTION INTRAVENOUS at 07:40

## 2017-01-11 RX ADMIN — Medication 250 MG: at 17:17

## 2017-01-11 RX ADMIN — CEFEPIME 2 G: 2 INJECTION, POWDER, FOR SOLUTION INTRAVENOUS at 12:25

## 2017-01-11 RX ADMIN — SODIUM CHLORIDE 150 MG PE: 9 INJECTION, SOLUTION INTRAVENOUS at 00:11

## 2017-01-11 RX ADMIN — TAZOBACTAM SODIUM AND PIPERACILLIN SODIUM 3.38 G: 375; 3 INJECTION, SOLUTION INTRAVENOUS at 00:12

## 2017-01-11 RX ADMIN — MINERAL SUPPLEMENT IRON 300 MG / 5 ML STRENGTH LIQUID 100 PER BOX UNFLAVORED 325 MG: at 08:21

## 2017-01-11 RX ADMIN — SODIUM CHLORIDE 100 ML/HR: 4.5 INJECTION, SOLUTION INTRAVENOUS at 02:51

## 2017-01-11 RX ADMIN — SODIUM CHLORIDE 150 MG PE: 9 INJECTION, SOLUTION INTRAVENOUS at 07:40

## 2017-01-11 RX ADMIN — Medication 250 MG: at 08:21

## 2017-01-11 RX ADMIN — ESCITALOPRAM 10 MG: 10 TABLET, FILM COATED ORAL at 08:21

## 2017-01-11 RX ADMIN — SODIUM CHLORIDE 100 ML/HR: 4.5 INJECTION, SOLUTION INTRAVENOUS at 15:51

## 2017-01-11 RX ADMIN — LEVETIRACETAM 500 MG: 5 INJECTION INTRAVENOUS at 08:21

## 2017-01-11 NOTE — PROGRESS NOTES
"[facility]  Cardiology Progress Note    Patient Identification:  Name: Servando Kapadia  Age: 56 y.o.  Sex: male  :  1960  MRN: [id number]                 Follow Up / Chief Complaint: Paroxysmal A. fib, anticoagulation    Interval History:  56-year-old male with history of paroxysmal A. fib on chronic anticoagulation who presented from nursing facility following a seizure with fever of 102, uncontrolled hypertension, influenza A, UTI and possible bacteremia. He had a brief episode of paroxysmal A. fib with RVR last evening in the setting of magnesium level I.2.      Subjective:  Awake and alert today. Denies chest pain, palpitations or shortness of breath \"feel a lot better\"    Objective:  HR >60's without further arrhythmia or ectopy.  Diastolic ini the 80's   INR 2.87   H/H stable      Past Medical History:  Past Medical History   Diagnosis Date   • Anemia    • Atrial fibrillation    • Atrial fibrillation    • Benign prostatic hyperplasia with lower urinary tract symptoms    • Cerebral infarct    • Chronic obstructive pyelonephritis    • Constipation    • Depression    • Diabetes mellitus      type 2   • Enlarged prostate    • Hemiplegia and hemiparesis    • Hyperlipidemia    • Hypertension    • Kidney stone    • Seizures    • Sleep apnea    • Stroke      apparent right side residual weakness   • Urinary incontinence      Past Surgical History:  Past Surgical History   Procedure Laterality Date   • Cystoscopy w/ ureteral stent placement N/A 5/3/2016     Procedure: CYSTOSCOPY LASER LITHOTRIPSY LEFT RETROGRADE URETERAL CATHETER AND STENT PLACEMENT;  Surgeon: Eduin Brennan MD;  Location: [facility] OR;  Service:    • Endoscopy w/ peg tube placement N/A 2016     Procedure: ESOPHAGOGASTRODUODENOSCOPY WITH PERCUTANEOUS ENDOSCOPIC GASTROSTOMY TUBE INSERTION;  Surgeon: Lion Weber MD;  Location: [facility] ENDOSCOPY;  Service:    • Ureteroscopy laser lithotripsy with stent insertion N/A " 6/8/2016     Procedure: URETEROSCOPY LASER LITHOTRIPSY STONE EXTRACTION WITH JJ STENT INSERTION;  Surgeon: Eduin Brennan MD;  Location: [facility];  Service:         Social History:   Social History   Substance Use Topics   • Smoking status: Never Smoker   • Smokeless tobacco: Never Used      Comment: pt unable to answer questions   • Alcohol use No      Family History:  History reviewed. No pertinent family history.       Allergies:  No Known Allergies  Scheduled Meds:    amLODIPine 5 mg Q24H   cefepime 2 g Q12H   docusate sodium 100 mg BID   escitalopram 10 mg Daily   ferrous sulfate 325 mg Daily   fosphenytoin 150 mg PE Q8H   levETIRAcetam 500 mg Q12H   metoprolol tartrate 25 mg Q12H   oseltamivir 75 mg Q12H   saccharomyces boulardii 250 mg BID   sennosides-docusate sodium 2 tablet Nightly   warfarin 3 mg Daily           INTAKE AND OUTPUT:    Intake/Output Summary (Last 24 hours) at 01/11/17 1215  Last data filed at 01/11/17 0833   Gross per 24 hour   Intake   3425 ml   Output      0 ml   Net   3425 ml       Review of Systems:   GI:  No n/v  Cardiac:  No further ectopy  Pulmonary:  No SOA or cough    Constitutional:  Temp:  [97.7 °F (36.5 °C)-98.5 °F (36.9 °C)] 98.5 °F (36.9 °C)  Heart Rate:  [63-92] 63  Resp:  [16-20] 20  BP: (131-151)/() 131/87    Physical Exam by Jairo Crum MD  General: Awake and alert Appears in no acute distress  Eyes: PRTL,  HEENT:  Thyroid not visibly enlarged. No mucosal cyanosis  Respiratory: Respirations regular and unlabored at rest.  Slightly diminished air entry in bases.  No  wheezes auscultated  Cardiovascular: S1S2 Regular rate and rhythm. No gallop  No pretibial pitting edema with TEDs  Gastrointestinal: Abdomen soft, non tender. Bowel sounds present.   Musculoskeletal: CHRISTIANSON x4. No abnormal movements  Extremities: No digital cyanosis  Skin: Skin warm and dry to touch.   Neuro: AAO x2 -3  Psych: pleasant and  cooperative            Cardiographics  Telemetry: SR 60's-80's, no ectopy      Echocardiogram:   · TDS , SUBOPTIMAL STUDY  · All left ventricular wall segments contract normally.  · Left Ventricle: Calculated EF = 59.4%  · There is no evidence of pericardial effusion.  · BUBBLE STUDY NEG FOR PFO      Results from last 7 days  Lab Units 01/11/17  0425 01/10/17  2115 01/10/17  1009   SODIUM mmol/L  --   --  135*   POTASSIUM mmol/L  --  4.0 3.3*   BUN mg/dL  --   --  5*   CREATININE mg/dL 0.67*  --  0.72*   CALCIUM mg/dL  --   --  8.5*       Results from last 7 days  Lab Units 01/11/17  0425 01/10/17  0359 01/09/17  0537   WBC 10*3/mm3 6.40 6.91 9.06   HEMOGLOBIN g/dL 12.4* 12.6* 12.4*   HEMATOCRIT % 39.5* 39.8* 40.2*   PLATELETS 10*3/mm3 237 221 189       Results from last 7 days  Lab Units 01/11/17  0425 01/10/17  0359 01/09/17  0537   INR  2.87* 2.55* 1.90*         Assessment:  - PAF rvr    - s/p Hypomagnesia  - h/o PAF  - elevated [id number] score -> on Coumadin for INR therapeutic  - HTN  - Dyslipidemia  - eColi UTI -> ID  - Strep bacteremia -> ID  - Seizure -> Neurology  - Febrile illness -> ID  - Influenza A -> ID  - h/o CVA -> Neurology  - h/o seizure disorder -> Neurology       Plan:  - PAF rvr . No recurrence.  No MI  TSH normal.  Magnesium replaced per protocol. Continue beta blocker    - s/p hypomagnesia - magnesium level stable at I.7 after replacement per protocol    - h/o PAF - elevated [id number] score ->   INR 2.87. Dose Coumadin to keep INR 2.0-3.0 range    - HTN -  Improved control on lopressor and Norvasc      Continue current Lopressor and Norvasc  Dose Coumadin to maintain INR 2.0-3.0 range.  No further ischemic work up planned at this time. Stable cardiac status. INR ordered in am    I reviewed the patient's new clinical results and treatment plan with Dr Crum. I personally viewed and interpreted the patient's EKG/Telemetry data    )1/11/2017  Jairo Crum MD      EMR  Dragon/Transcription disclaimer:   Much of this encounter note is an electronic transcription/translation of spoken language to printed text. The electronic translation of spoken language may permit erroneous, or at times, nonsensical words or phrases to be inadvertently transcribed; Although I have reviewed the note for such errors, some may still exist.

## 2017-01-11 NOTE — PLAN OF CARE
Problem: Patient Care Overview (Adult)  Goal: Plan of Care Review  Outcome: Ongoing (interventions implemented as appropriate)    01/11/17 0833   Coping/Psychosocial Response Interventions   Plan Of Care Reviewed With patient   Patient Care Overview   Progress improving   Outcome Evaluation   Outcome Summary/Follow up Plan Pt VSS. K 4.0 today. Q2 turns. Incontinence care. IV medications continued. BP meds given orally now, will watch closely. Denies pain or nausea. Will continue to monitor.          Problem: Sepsis (Adult)  Goal: Signs and Symptoms of Listed Potential Problems Will be Absent or Manageable (Sepsis)  Outcome: Ongoing (interventions implemented as appropriate)    Problem: Fall Risk (Adult)  Goal: Absence of Falls  Outcome: Ongoing (interventions implemented as appropriate)    Problem: Dysphagia (Adult)  Goal: Identify Related Risk Factors and Signs and Symptoms  Outcome: Ongoing (interventions implemented as appropriate)  Goal: Functional/Safe Swallow  Outcome: Ongoing (interventions implemented as appropriate)  Goal: Compensatory Techniques to Improve Safety/Function with Swallowing  Outcome: Ongoing (interventions implemented as appropriate)

## 2017-01-11 NOTE — PLAN OF CARE
Problem: Patient Care Overview (Adult)  Goal: Plan of Care Review  Outcome: Ongoing (interventions implemented as appropriate)    01/11/17 0241   Coping/Psychosocial Response Interventions   Plan Of Care Reviewed With patient   Patient Care Overview   Progress improving   Outcome Evaluation   Outcome Summary/Follow up Plan VSS. Oral care provided. Q2 turns. Antibiotics given per orders. Will continue to monitor.       Goal: Adult Individualization and Mutuality  Outcome: Ongoing (interventions implemented as appropriate)  Goal: Discharge Needs Assessment  Outcome: Ongoing (interventions implemented as appropriate)    Problem: Sepsis (Adult)  Goal: Signs and Symptoms of Listed Potential Problems Will be Absent or Manageable (Sepsis)  Outcome: Ongoing (interventions implemented as appropriate)    Problem: Fall Risk (Adult)  Goal: Absence of Falls  Outcome: Ongoing (interventions implemented as appropriate)    Problem: Dysphagia (Adult)  Goal: Identify Related Risk Factors and Signs and Symptoms  Outcome: Ongoing (interventions implemented as appropriate)  Goal: Functional/Safe Swallow  Outcome: Ongoing (interventions implemented as appropriate)  Goal: Compensatory Techniques to Improve Safety/Function with Swallowing  Outcome: Ongoing (interventions implemented as appropriate)

## 2017-01-11 NOTE — PROGRESS NOTES
LOS: 4 days     Chief Complaint:  Follow-up flu    Interval History:  No acute events. He is afebrile and WBC normalized. He denies any pain. He denies any current urinary symptoms or shortness of air. He is tolerating antibiotics w/o rash or diarrhea. Panorex was poor quality study and equivocal. Exam doesn't show infected tooth.    ROS: no n/v/d    Vital Signs  Temp:  [97.7 °F (36.5 °C)-98.3 °F (36.8 °C)] 97.7 °F (36.5 °C)  Heart Rate:  [65-92] 80  Resp:  [16-20] 20  BP: (137-151)/() 144/102    Physical Exam:  General: awake and alert today  Head: round facies  Eyes: PERRL, EOMI, no scleral icterus  ENT: MM dry, OP clear, no thrush. Poor dentition. No tooth inflammation seen.  Neck: Suppl  Cardiovascular: NR, RR, no murmurs, rubs; no LE edema  Respiratory: normal work of breathing on ambient air  GI: Abdomen is obese, soft, non-tender, non-distended  : no Beyer catheter present  Musculoskeletal: no joint abnormalities, normal musculature  Skin: No rashes, lesions, or embolic phenomenon  Neurological: 5/5 UE strength  Psychiatric: Alert and oriented x 2  Vasc: no cyanosis; PIV w/o erythema    Antibiotics:  •  oseltamivir (TAMIFLU) capsule 75 mg, 75 mg, Oral, Q12H, Bogdan Palomares MD, 75 mg at 01/07/17 1026  •  piperacillin-tazobactam (ZOSYN) 3.375 g in dextrose 50 mL IVPB (premix), 3.375 g, Intravenous, Q8H, Ramón Moss MD    LABS:  CBC, Crt, micro reviewed today  Lab Results   Component Value Date    WBC 6.40 01/11/2017    HGB 12.4 (L) 01/11/2017    HCT 39.5 (L) 01/11/2017    MCV 81.8 01/11/2017     01/11/2017     Lab Results   Component Value Date    CREATININE 0.67 (L) 01/11/2017     Flu A Ag +  Procal 10---->5  UA TNTC WBCs and RBCs  A1c 6.3%     Microbiology:  1/7 BCx: Strep salivarius and Coag-Neg Staph in the other set  1/7 UCx: >100k GNR  1/7 RVP: negative  1/8 BCx NGTD     Prior Radiology:  TTE negative TV vegetations  CT A/P with non-obstructing stones    Assessment/Plan    1. Sepsis due to Strep salivarius bacteremia  -possibly due to oral source; Panorex shows POSSIBLE pericapical abscess but not a great study; on my exam I do not see any particularly inflamed teeth requiring urgent attention; I would recommend that he see a dentist after discharge  -stop Zosyn  -start cefepime 2 g IV q12h x 14 day course w/ STOP DATE 1/22/17  -weekly CBC w/ diff and Crt faxed to me at [phone]  -no specific ID f/u appt needed    2. Influenza A  -continue tamiflu 75 mg PO BID x 5 days     3. TME - stable. S/P neurology evaluation.     4. Cystitis and non-obstructing renal calculi- he can't tell me much in the way of urinary symptoms so will treat the E coli with a 7-day course of cefepime per #1. CT with non-obstructing stones. No intervention needed.     Thank you for this consult. ID will sign off w/ antibiotics plan in place but please call me at [phone] if further questions.

## 2017-01-11 NOTE — PROGRESS NOTES
Continued Stay Note  [facility]     Patient Name: Servando Kapadia  MRN: [id number]  Today's Date: 1/11/2017    Admit Date: 1/6/2017          Discharge Plan       01/11/17 0959    Case Management/Social Work Plan    Plan Sylvester with IV ATB    Patient/Family In Agreement With Plan yes    Additional Comments Dr. Moss informs pt will need IV ATB. Called to Yael/Sylvester to inform and left vmm. Awaiting bed status and level of care. Packet is in office. CCP will continue to follow and assist as needed.               Discharge Codes     None            Byron Vegas RN

## 2017-01-11 NOTE — PROGRESS NOTES
Continued Stay Note  [facility]     Patient Name: Servando Kapadia  MRN: [id number]  Today's Date: 1/11/2017    Admit Date: 1/6/2017          Discharge Plan       01/11/17 1609    Case Management/Social Work Plan    Plan Sylvester with IV ATB    Patient/Family In Agreement With Plan yes    Additional Comments VMM from Yael/Sylvester and she got my message on the patient about the IV ATB. She says patient is from  level on a paid bed hold and they will accept him back.               Discharge Codes     None            Byron Vegas RN

## 2017-01-12 LAB
ANION GAP SERPL CALCULATED.3IONS-SCNC: 13.1 MMOL/L
BACTERIA UR QL AUTO: ABNORMAL /HPF
BASOPHILS # BLD AUTO: 0.02 10*3/MM3 (ref 0–0.2)
BASOPHILS NFR BLD AUTO: 0.3 % (ref 0–1.5)
BILIRUB UR QL STRIP: NEGATIVE
BUN BLD-MCNC: 3 MG/DL (ref 6–20)
BUN/CREAT SERPL: 5.8 (ref 7–25)
CALCIUM SPEC-SCNC: 8.8 MG/DL (ref 8.6–10.5)
CHLORIDE SERPL-SCNC: 106 MMOL/L (ref 98–107)
CLARITY UR: CLEAR
CO2 SERPL-SCNC: 22.9 MMOL/L (ref 22–29)
COLOR UR: YELLOW
CREAT BLD-MCNC: 0.52 MG/DL (ref 0.76–1.27)
DEPRECATED RDW RBC AUTO: 43.8 FL (ref 37–54)
EOSINOPHIL # BLD AUTO: 0.26 10*3/MM3 (ref 0–0.7)
EOSINOPHIL NFR BLD AUTO: 4.3 % (ref 0.3–6.2)
ERYTHROCYTE [DISTWIDTH] IN BLOOD BY AUTOMATED COUNT: 14.9 % (ref 11.5–14.5)
GFR SERPL CREATININE-BSD FRML MDRD: >150 ML/MIN/1.73
GFR SERPL CREATININE-BSD FRML MDRD: >150 ML/MIN/1.73
GLUCOSE BLD-MCNC: 116 MG/DL (ref 65–99)
GLUCOSE BLDC GLUCOMTR-MCNC: 112 MG/DL (ref 70–130)
GLUCOSE BLDC GLUCOMTR-MCNC: 125 MG/DL (ref 70–130)
GLUCOSE BLDC GLUCOMTR-MCNC: 139 MG/DL (ref 70–130)
GLUCOSE BLDC GLUCOMTR-MCNC: 155 MG/DL (ref 70–130)
GLUCOSE UR STRIP-MCNC: NEGATIVE MG/DL
HCT VFR BLD AUTO: 41.4 % (ref 40.4–52.2)
HGB BLD-MCNC: 13.1 G/DL (ref 13.7–17.6)
HGB UR QL STRIP.AUTO: NEGATIVE
HYALINE CASTS UR QL AUTO: ABNORMAL /LPF
IMM GRANULOCYTES # BLD: 0.02 10*3/MM3 (ref 0–0.03)
IMM GRANULOCYTES NFR BLD: 0.3 % (ref 0–0.5)
INR PPP: 2.33 (ref 0.9–1.1)
KETONES UR QL STRIP: NEGATIVE
LEUKOCYTE ESTERASE UR QL STRIP.AUTO: ABNORMAL
LYMPHOCYTES # BLD AUTO: 2.32 10*3/MM3 (ref 0.9–4.8)
LYMPHOCYTES NFR BLD AUTO: 38 % (ref 19.6–45.3)
MCH RBC QN AUTO: 25.8 PG (ref 27–32.7)
MCHC RBC AUTO-ENTMCNC: 31.6 G/DL (ref 32.6–36.4)
MCV RBC AUTO: 81.5 FL (ref 79.8–96.2)
MONOCYTES # BLD AUTO: 0.64 10*3/MM3 (ref 0.2–1.2)
MONOCYTES NFR BLD AUTO: 10.5 % (ref 5–12)
NEUTROPHILS # BLD AUTO: 2.85 10*3/MM3 (ref 1.9–8.1)
NEUTROPHILS NFR BLD AUTO: 46.6 % (ref 42.7–76)
NITRITE UR QL STRIP: NEGATIVE
PH UR STRIP.AUTO: 6 [PH] (ref 5–8)
PLATELET # BLD AUTO: 229 10*3/MM3 (ref 140–500)
PMV BLD AUTO: 9.8 FL (ref 6–12)
POTASSIUM BLD-SCNC: 3.4 MMOL/L (ref 3.5–5.2)
PROT UR QL STRIP: ABNORMAL
PROTHROMBIN TIME: 24.7 SECONDS (ref 11.7–14.2)
RBC # BLD AUTO: 5.08 10*6/MM3 (ref 4.6–6)
RBC # UR: ABNORMAL /HPF
REF LAB TEST METHOD: ABNORMAL
SODIUM BLD-SCNC: 142 MMOL/L (ref 136–145)
SP GR UR STRIP: 1.01 (ref 1–1.03)
SQUAMOUS #/AREA URNS HPF: ABNORMAL /HPF
UROBILINOGEN UR QL STRIP: ABNORMAL
WBC NRBC COR # BLD: 6.11 10*3/MM3 (ref 4.5–10.7)
WBC UR QL AUTO: ABNORMAL /HPF

## 2017-01-12 PROCEDURE — 82962 GLUCOSE BLOOD TEST: CPT

## 2017-01-12 PROCEDURE — 85610 PROTHROMBIN TIME: CPT | Performed by: INTERNAL MEDICINE

## 2017-01-12 PROCEDURE — 99233 SBSQ HOSP IP/OBS HIGH 50: CPT | Performed by: INTERNAL MEDICINE

## 2017-01-12 PROCEDURE — 02HV33Z INSERTION OF INFUSION DEVICE INTO SUPERIOR VENA CAVA, PERCUTANEOUS APPROACH: ICD-10-PCS | Performed by: FAMILY MEDICINE

## 2017-01-12 PROCEDURE — 81001 URINALYSIS AUTO W/SCOPE: CPT | Performed by: FAMILY MEDICINE

## 2017-01-12 PROCEDURE — 80048 BASIC METABOLIC PNL TOTAL CA: CPT | Performed by: FAMILY MEDICINE

## 2017-01-12 PROCEDURE — 99232 SBSQ HOSP IP/OBS MODERATE 35: CPT | Performed by: FAMILY MEDICINE

## 2017-01-12 PROCEDURE — 85025 COMPLETE CBC W/AUTO DIFF WBC: CPT | Performed by: FAMILY MEDICINE

## 2017-01-12 RX ADMIN — METOPROLOL TARTRATE 25 MG: 25 TABLET ORAL at 10:43

## 2017-01-12 RX ADMIN — METOPROLOL TARTRATE 25 MG: 25 TABLET ORAL at 00:30

## 2017-01-12 RX ADMIN — METOPROLOL TARTRATE 25 MG: 25 TABLET ORAL at 21:24

## 2017-01-12 RX ADMIN — MINERAL SUPPLEMENT IRON 300 MG / 5 ML STRENGTH LIQUID 100 PER BOX UNFLAVORED 325 MG: at 10:43

## 2017-01-12 RX ADMIN — Medication 250 MG: at 18:07

## 2017-01-12 RX ADMIN — OSELTAMIVIR PHOSPHATE 75 MG: 75 CAPSULE ORAL at 10:43

## 2017-01-12 RX ADMIN — LEVETIRACETAM 500 MG: 500 TABLET, FILM COATED ORAL at 10:43

## 2017-01-12 RX ADMIN — WARFARIN SODIUM 3 MG: 3 TABLET ORAL at 18:07

## 2017-01-12 RX ADMIN — PHENYTOIN 300 MG: 125 SUSPENSION ORAL at 00:46

## 2017-01-12 RX ADMIN — PHENYTOIN 300 MG: 125 SUSPENSION ORAL at 21:24

## 2017-01-12 RX ADMIN — METFORMIN HYDROCHLORIDE 1000 MG: 1000 TABLET ORAL at 18:07

## 2017-01-12 RX ADMIN — OSELTAMIVIR PHOSPHATE 75 MG: 75 CAPSULE ORAL at 00:31

## 2017-01-12 RX ADMIN — ESCITALOPRAM 10 MG: 10 TABLET, FILM COATED ORAL at 10:43

## 2017-01-12 RX ADMIN — OSELTAMIVIR PHOSPHATE 75 MG: 75 CAPSULE ORAL at 21:24

## 2017-01-12 RX ADMIN — LEVETIRACETAM 500 MG: 500 TABLET, FILM COATED ORAL at 21:24

## 2017-01-12 RX ADMIN — METFORMIN HYDROCHLORIDE 1000 MG: 1000 TABLET ORAL at 00:30

## 2017-01-12 RX ADMIN — AMLODIPINE BESYLATE 5 MG: 5 TABLET ORAL at 10:43

## 2017-01-12 RX ADMIN — PHENYTOIN 200 MG: 125 SUSPENSION ORAL at 12:38

## 2017-01-12 RX ADMIN — LEVETIRACETAM 500 MG: 500 TABLET, FILM COATED ORAL at 00:30

## 2017-01-12 RX ADMIN — CEFEPIME 2 G: 2 INJECTION, POWDER, FOR SOLUTION INTRAVENOUS at 10:42

## 2017-01-12 RX ADMIN — CEFEPIME 2 G: 2 INJECTION, POWDER, FOR SOLUTION INTRAVENOUS at 00:31

## 2017-01-12 RX ADMIN — METFORMIN HYDROCHLORIDE 1000 MG: 1000 TABLET ORAL at 10:43

## 2017-01-12 RX ADMIN — Medication 250 MG: at 10:43

## 2017-01-12 NOTE — PROGRESS NOTES
Servando Kapadia  56 y.o.   LOS: 4 days   Patient Care Team:  Dylan Cuba MD as PCP - General (Family Medicine)    Chief Complaint:  uti ? FLU  I have reviewed the patient's medical history in detail and updated the computerized patient record.    Subjective     Patient Complaints: none    History taken from: patient chart    Interval History:  bACK TO BASELINE  Review of Systems:   na    Objective     Lab Results (last 24 hours)     Procedure Component Value Units Date/Time    CBC (No Diff) [[id number]]  (Abnormal) Collected:  01/11/17 0425    Specimen:  Blood Updated:  01/11/17 0515     WBC 6.40 10*3/mm3      RBC 4.83 10*6/mm3      Hemoglobin 12.4 (L) g/dL      Hematocrit 39.5 (L) %      MCV 81.8 fL      MCH 25.7 (L) pg      MCHC 31.4 (L) g/dL      RDW 14.8 (H) %      RDW-SD 43.8 fl      MPV 9.7 fL      Platelets 237 10*3/mm3     Creatinine, Serum [[id number]]  (Abnormal) Collected:  01/11/17 0425    Specimen:  Blood Updated:  01/11/17 0521     Creatinine 0.67 (L) mg/dL      eGFR Non African Amer 123 mL/min/1.73      eGFR  African Amer 149 mL/min/1.73     Protime-INR [[id number]]  (Abnormal) Collected:  01/11/17 0425    Specimen:  Blood Updated:  01/11/17 0522     Protime 29.1 (H) Seconds      INR 2.87 (H)     Blood Culture [[id number]]  (Normal) Collected:  01/08/17 0918    Specimen:  Blood from Arm, Right Updated:  01/11/17 1001     Blood Culture No growth at 3 days     Blood Culture [[id number]]  (Normal) Collected:  01/08/17 0956    Specimen:  Blood from Hand, Left Updated:  01/11/17 1001     Blood Culture No growth at 3 days     POC Glucose Fingerstick [[id number]]  (Abnormal) Collected:  01/11/17 1146    Specimen:  Blood Updated:  01/11/17 1147     Glucose 168 (H) mg/dL     Narrative:       Meter: [id number] : [id number] Kerrie Trinidad    POC Glucose Fingerstick [[id number]]  (Abnormal) Collected:  01/11/17 1650    Specimen:  Blood Updated:  01/11/17 1651     Glucose 134 (H) mg/dL     Narrative:       Meter:  [id number] : [id number] Kerrie Trinidad    POC Glucose Fingerstick [[id number]]  (Normal) Collected:  01/11/17 2136    Specimen:  Blood Updated:  01/11/17 2137     Glucose 119 mg/dL     Narrative:       Meter: [id number] : [id number] Malcolm Lagunas          Vital Signs  Temp:  [97.2 °F (36.2 °C)-98.5 °F (36.9 °C)] 97.2 °F (36.2 °C)  Heart Rate:  [63-80] 74  Resp:  [16-20] 18  BP: (131-149)/() 149/109    Flowsheet Rows         First Filed Value    Admission Height  64.5\" (163.8 cm) Documented at 01/06/2017 2354    Admission Weight  173 lb 1 oz (78.5 kg) Documented at 01/06/2017 2354            Intake/Output Summary (Last 24 hours) at 01/11/17 2159  Last data filed at 01/11/17 1855   Gross per 24 hour   Intake   3453 ml   Output      0 ml   Net   3453 ml     Physical Exam:  General Appearance alert, appears stated age and cooperative  Neck no adenopathy, suppple, trachea midline, no thyromegaly, no carotid bruit and no JVD  Lungs clear to auscultation, respirations regular, respirations even and respirations unlabored  Heart regular rhythm & normal rate, normal S1, S2, no murmur, no nova, no rub and no click  Abdomen normal bowel sounds, no masses, no hepatomegaly, no splenomegaly, soft non-tender, no guarding and no rebound tenderness  Neurologic Mental Status back to baseline ,knows I am HUMA Batista.     Results Review:    I reviewed the patient's new clinical results.    Medication Review:   Review of patient's allergies indicates no known allergies.  Current Facility-Administered Medications   Medication Dose Route Frequency Provider Last Rate Last Dose   • acetaminophen (TYLENOL) tablet 650 mg  650 mg Oral Q6H PRN Bogdan Palomares MD       • acetaminophen (TYLENOL) tablet 650 mg  650 mg Oral Q4H PRN Bogdan Palomares MD       • amLODIPine (NORVASC) tablet 5 mg  5 mg Oral Q24H MARY Ayon   5 mg at 01/11/17 0821   • bisacodyl (DULCOLAX) suppository 10 mg  10 mg Rectal Daily PRN Bogdan Palomares MD     "   • cefepime (MAXIPIME) 2 g/100 mL 0.9% NS (mbp)  2 g Intravenous Q12H Ramón Moss MD   2 g at 01/11/17 1225   • dextrose (GLUTOSE) oral gel 15 g  15 g Oral PRN Bogdan Palomares MD       • docusate sodium (COLACE) capsule 100 mg  100 mg Oral BID Bogdan Palomares MD   100 mg at 01/10/17 1717   • escitalopram (LEXAPRO) tablet 10 mg  10 mg Oral Daily Bogdan Palomares MD   10 mg at 01/11/17 0821   • ferrous sulfate 300 (60 FE) MG/5ML syrup 325 mg  325 mg Oral Daily Bogdan Palomares MD   325 mg at 01/11/17 0821   • hydrALAZINE (APRESOLINE) injection 20 mg  20 mg Intravenous Q6H PRN MARY Ayon   20 mg at 01/10/17 1245   • levETIRAcetam (KEPPRA) tablet 500 mg  500 mg Oral Q12H Bogdan Palomares MD       • magnesium hydroxide (MILK OF MAGNESIA) 400 MG/5ML suspension 30 mL  30 mL Oral Daily PRN Bogdan Palomares MD       • magnesium sulfate in D5W 1g/100mL (PREMIX) IVPB 1 g  1 g Intravenous BID PRN Bogdan Palomares MD   1 g at 01/08/17 1436   • metFORMIN (GLUCOPHAGE) tablet 1,000 mg  1,000 mg Oral BID With Meals Dylan Cuba MD       • metoprolol tartrate (LOPRESSOR) tablet 25 mg  25 mg Oral Q12H MARY Ayon   25 mg at 01/11/17 0821   • Morphine sulfate (PF) injection 1 mg  1 mg Intravenous Q4H PRN Bogdan Palomares MD        And   • naloxone (NARCAN) injection 0.4 mg  0.4 mg Intravenous Q5 Min PRN Bogdan Palomaers MD       • nitroglycerin (NITROSTAT) SL tablet 0.4 mg  0.4 mg Sublingual Q5 Min PRN Bogdan Palomares MD       • oseltamivir (TAMIFLU) capsule 75 mg  75 mg Oral Q12H Bogdan Palomares MD   75 mg at 01/11/17 0821   • oseltamivir (TAMIFLU) capsule 75 mg  75 mg Oral Q12H Dylan Cuba MD       • Pharmacy Consult- Convert Oral Keppra to IV Route   Does not apply Continuous PRN Bogdan Palomares MD       • Pharmacy Consult- Convert Oral Phenytoin to IV route   Does not apply Continuous PRN Bogdan Palomares MD       • [START ON 1/12/2017] phenytoin (DILANTIN) 125 MG/5ML suspension 200 mg   200 mg Oral Daily Bogdan Palomares MD       • phenytoin (DILANTIN) 125 MG/5ML suspension 300 mg  300 mg Oral Nightly Bogdan Palomares MD       • potassium chloride (MICRO-K) CR capsule 40 mEq  40 mEq Oral PRN Dylan Cuba MD   40 mEq at 01/10/17 1717    Or   • potassium chloride (KLOR-CON) packet 40 mEq  40 mEq Oral PRN Dylan Cuba MD        Or   • potassium chloride 10 mEq in 100 mL IVPB  10 mEq Intravenous Q1H PRN Dylan Cuba MD       • promethazine (PHENERGAN) tablet 12.5 mg  12.5 mg Oral Q6H PRN Bogdan Palomares MD   12.5 mg at 01/10/17 1428   • saccharomyces boulardii (FLORASTOR) capsule 250 mg  250 mg Oral BID Ramón Moss MD   250 mg at 01/11/17 1717   • sennosides-docusate sodium (SENOKOT-S) 8.6-50 MG tablet 2 tablet  2 tablet Oral Nightly Bogdan Palomares MD   2 tablet at 01/10/17 2031   • sodium chloride 0.9 % flush 1-10 mL  1-10 mL Intravenous PRN Bogdan Palomares MD       • sodium chloride 0.9 % flush 10 mL  10 mL Intravenous PRN Fran Schrader MD       • warfarin (COUMADIN) tablet 3 mg  3 mg Oral Daily MARY Ayon   3 mg at 01/11/17 1717     Prescriptions Prior to Admission   Medication Sig Dispense Refill Last Dose   • acetaminophen (TYLENOL) 325 MG tablet 650 mg by Enteral route every 6 (six) hours as needed (for pain/elevated temperature). GIVEN PER FEEDING TUBE   not needed   • bisacodyl (DULCOLAX) 10 MG suppository Insert 10 mg into the rectum daily as needed for constipation.   not needed   • dextrose (GLUTOSE) 40 % gel Take 15 g by mouth as needed for low blood sugar (may give for BS<60 & symptomatic).   not needed   • escitalopram (LEXAPRO) 10 MG tablet 10 mg by Enteral route daily. GIVEN PER FEEDING TUBE   6/7/2016 at 2100   • ferrous sulfate 300 (60 FE) MG/5ML syrup 325 mg by Enteral route daily. PER FEEDING TUBE   6/7/2016 at 2100   • glucagon (GLUCAGEN) 1 MG injection Inject 1 mg into the shoulder, thigh, or buttocks 1 (one) time as needed for low  blood sugar (give for BS<60 & symptomatic, may repeat x2 with 10mins apart).   not needed   • levETIRAcetam (KEPPRA) 500 MG tablet 500 mg by Enteral route 2 (two) times a day. GIVEN AT 0900 & 2100 PER FEEDING TUBE   6/7/2016 at 2100   • magnesium hydroxide (MILK OF MAGNESIA) 400 MG/5ML suspension 30 mL by Enteral route daily as needed for constipation. PER FEEDING TUBE   not needed   • metFORMIN (GLUCOPHAGE) 1000 MG tablet 1,000 mg by Enteral route 2 (two) times a day with meals. given at 0730 & 1630 PER FEEDING TUBE   6/7/2016 at 1630   • phenytoin (DILANTIN) 125 MG/5ML suspension Take 300 mg by mouth Every Night.      • sennosides-docusate sodium (SENOKOT-S) 8.6-50 MG tablet 2 tablets by Enteral route every night. GIVEN PER FEEDING TUBE   6/7/2016 at 2100   • simvastatin (ZOCOR) 10 MG tablet 10 mg by Enteral route every night. GIVEN PER FEEDING TUBE   6/7/2016 at 2100   • warfarin (COUMADIN) 4 MG tablet Take 4 mg by mouth Daily.      • phenytoin (DILANTIN) 100 MG ER capsule 200 mg by Enteral route every night. GIVEN PER FEEDING TUBE   6/7/2016 at 2100   • warfarin (COUMADIN) 3 MG tablet 4 mg by Enteral route daily. Give 1 tablet by mouth at bedtime related to unspecified atrial fibrillation PER FEEDING TUBE  HOLDING FOR SURGERY   6/3/2016       Assessment:    Active Problems:    UTI (urinary tract infection), bacterial  TME and seizure due to #1        Plan:Continue current treatment plans .      Dylan Cuba MD  01/11/17  9:59 PM

## 2017-01-12 NOTE — PROGRESS NOTES
Servando Kapadia  56 y.o.   LOS: 5 days   Patient Care Team:  Dylan Cuba MD as PCP - General (Family Medicine)    Chief Complaint:  UTI and bacteremia due to ?  I have reviewed the patient's medical history in detail and updated the computerized patient record.    Subjective     Patient Complaints: none    History taken from: patient chart    Interval History:  PICC in the works ? D/C in am  Review of Systems:   na    Objective     Lab Results (last 24 hours)     Procedure Component Value Units Date/Time    POC Glucose Fingerstick [[id number]]  (Abnormal) Collected:  01/11/17 1146    Specimen:  Blood Updated:  01/11/17 1147     Glucose 168 (H) mg/dL     Narrative:       Meter: [id number] : [id number] Kerrie Vivi    POC Glucose Fingerstick [[id number]]  (Abnormal) Collected:  01/11/17 1650    Specimen:  Blood Updated:  01/11/17 1651     Glucose 134 (H) mg/dL     Narrative:       Meter: [id number] : [id number] Kerrie Vivi    POC Glucose Fingerstick [[id number]]  (Normal) Collected:  01/11/17 2136    Specimen:  Blood Updated:  01/11/17 2137     Glucose 119 mg/dL     Narrative:       Meter: [id number] : [id number] Malcolm Lagunas    CBC & Differential [[id number]] Collected:  01/12/17 0544    Specimen:  Blood Updated:  01/12/17 0701    Narrative:       The following orders were created for panel order CBC & Differential.  Procedure                               Abnormality         Status                     ---------                               -----------         ------                     CBC Auto Differential[[id number]]         Abnormal            Final result                 Please view results for these tests on the individual orders.    CBC Auto Differential [[id number]]  (Abnormal) Collected:  01/12/17 0544    Specimen:  Blood Updated:  01/12/17 0701     WBC 6.11 10*3/mm3      RBC 5.08 10*6/mm3      Hemoglobin 13.1 (L) g/dL      Hematocrit 41.4 %      MCV 81.5 fL      MCH 25.8 (L) pg      MCHC 31.6  (L) g/dL      RDW 14.9 (H) %      RDW-SD 43.8 fl      MPV 9.8 fL      Platelets 229 10*3/mm3      Neutrophil % 46.6 %      Lymphocyte % 38.0 %      Monocyte % 10.5 %      Eosinophil % 4.3 %      Basophil % 0.3 %      Immature Grans % 0.3 %      Neutrophils, Absolute 2.85 10*3/mm3      Lymphocytes, Absolute 2.32 10*3/mm3      Monocytes, Absolute 0.64 10*3/mm3      Eosinophils, Absolute 0.26 10*3/mm3      Basophils, Absolute 0.02 10*3/mm3      Immature Grans, Absolute 0.02 10*3/mm3     Protime-INR [[id number]]  (Abnormal) Collected:  01/12/17 0544    Specimen:  Blood Updated:  01/12/17 0706     Protime 24.7 (H) Seconds      INR 2.33 (H)     Basic Metabolic Panel [[id number]]  (Abnormal) Collected:  01/12/17 0544    Specimen:  Blood Updated:  01/12/17 0721     Glucose 116 (H) mg/dL      BUN 3 (L) mg/dL      Creatinine 0.52 (L) mg/dL      Sodium 142 mmol/L      Potassium 3.4 (L) mmol/L      Chloride 106 mmol/L      CO2 22.9 mmol/L      Calcium 8.8 mg/dL      eGFR  African Amer >150 mL/min/1.73      eGFR Non African Amer >150 mL/min/1.73      BUN/Creatinine Ratio 5.8 (L)      Anion Gap 13.1 mmol/L     Narrative:       GFR Normal >60  Chronic Kidney Disease <60  Kidney Failure <15    POC Glucose Fingerstick [[id number]]  (Normal) Collected:  01/12/17 0733    Specimen:  Blood Updated:  01/12/17 0735     Glucose 112 mg/dL     Narrative:       Meter: [id number] : [id number] Kerrie Trinidad    Blood Culture [[id number]]  (Normal) Collected:  01/08/17 0918    Specimen:  Blood from Arm, Right Updated:  01/12/17 1001     Blood Culture No growth at 4 days     Blood Culture [[id number]]  (Normal) Collected:  01/08/17 0956    Specimen:  Blood from Hand, Left Updated:  01/12/17 1001     Blood Culture No growth at 4 days           Vital Signs  Temp:  [97.2 °F (36.2 °C)-98.5 °F (36.9 °C)] 97.3 °F (36.3 °C)  Heart Rate:  [63-74] 64  Resp:  [16-20] 20  BP: (131-160)/() 141/106    Flowsheet Rows         First Filed Value    Admission  "Height  64.5\" (163.8 cm) Documented at 01/06/2017 2354    Admission Weight  173 lb 1 oz (78.5 kg) Documented at 01/06/2017 2354            Intake/Output Summary (Last 24 hours) at 01/12/17 1022  Last data filed at 01/12/17 0730   Gross per 24 hour   Intake   1448 ml   Output      0 ml   Net   1448 ml     Physical Exam:  General Appearance alert, appears stated age and cooperative  Lungs clear to auscultation, respirations regular, respirations even and respirations unlabored  Heart regular rhythm & normal rate, normal S1, S2, no murmur, no nova, no rub and no click  Abdomen normal bowel sounds, no masses, no hepatomegaly, no splenomegaly, soft non-tender, no guarding and no rebound tenderness  Neurologic Mental Status baseline     Results Review:    I reviewed the patient's new clinical results.    Medication Review:   Review of patient's allergies indicates no known allergies.  Current Facility-Administered Medications   Medication Dose Route Frequency Provider Last Rate Last Dose   • acetaminophen (TYLENOL) tablet 650 mg  650 mg Oral Q6H PRN Bogdan Palomares MD       • acetaminophen (TYLENOL) tablet 650 mg  650 mg Oral Q4H PRN Bogdan Palomares MD       • amLODIPine (NORVASC) tablet 5 mg  5 mg Oral Q24H MARY Ayon   5 mg at 01/11/17 0821   • bisacodyl (DULCOLAX) suppository 10 mg  10 mg Rectal Daily PRN Bogdan Palomares MD       • cefepime (MAXIPIME) 2 g/100 mL 0.9% NS (mbp)  2 g Intravenous Q12H Ramón Moss MD   2 g at 01/12/17 0031   • dextrose (GLUTOSE) oral gel 15 g  15 g Oral PRN Bogdan Palomares MD       • docusate sodium (COLACE) capsule 100 mg  100 mg Oral BID Bogdan Palomares MD   100 mg at 01/10/17 1717   • escitalopram (LEXAPRO) tablet 10 mg  10 mg Oral Daily Bogdan Palomares MD   10 mg at 01/11/17 0821   • ferrous sulfate 300 (60 FE) MG/5ML syrup 325 mg  325 mg Oral Daily Bogdan Palomares MD   325 mg at 01/11/17 0821   • hydrALAZINE (APRESOLINE) injection 20 mg  20 mg " Intravenous Q6H PRN Daphne MOREJON Carol APRN   20 mg at 01/10/17 1245   • levETIRAcetam (KEPPRA) tablet 500 mg  500 mg Oral Q12H Bogdan Palomares MD   500 mg at 01/12/17 0030   • magnesium hydroxide (MILK OF MAGNESIA) 400 MG/5ML suspension 30 mL  30 mL Oral Daily PRN Bogdan Palomares MD       • magnesium sulfate in D5W 1g/100mL (PREMIX) IVPB 1 g  1 g Intravenous BID PRN Bogdan Palomares MD   1 g at 01/08/17 1436   • metFORMIN (GLUCOPHAGE) tablet 1,000 mg  1,000 mg Oral BID With Meals Dylan Cuba MD   1,000 mg at 01/12/17 0030   • metoprolol tartrate (LOPRESSOR) tablet 25 mg  25 mg Oral Q12H Daphne MOREJON Carol APRN   25 mg at 01/12/17 0030   • Morphine sulfate (PF) injection 1 mg  1 mg Intravenous Q4H PRN Bogdan Palomares MD        And   • naloxone (NARCAN) injection 0.4 mg  0.4 mg Intravenous Q5 Min PRN Bogdan Palomares MD       • nitroglycerin (NITROSTAT) SL tablet 0.4 mg  0.4 mg Sublingual Q5 Min PRN Bogdan Palomares MD       • oseltamivir (TAMIFLU) capsule 75 mg  75 mg Oral Q12H Dylan Cuba MD   75 mg at 01/12/17 0031   • phenytoin (DILANTIN) 125 MG/5ML suspension 200 mg  200 mg Oral Daily Bogdan Palomares MD       • phenytoin (DILANTIN) 125 MG/5ML suspension 300 mg  300 mg Oral Nightly Bogdan Palomares MD   300 mg at 01/12/17 0046   • potassium chloride (MICRO-K) CR capsule 40 mEq  40 mEq Oral PRN Dylan Cuba MD   40 mEq at 01/10/17 1717    Or   • potassium chloride (KLOR-CON) packet 40 mEq  40 mEq Oral PRN Dylan Cuba MD        Or   • potassium chloride 10 mEq in 100 mL IVPB  10 mEq Intravenous Q1H PRN Dylan Cuba MD       • promethazine (PHENERGAN) tablet 12.5 mg  12.5 mg Oral Q6H PRN Bogdan Palomares MD   12.5 mg at 01/10/17 1428   • saccharomyces boulardii (FLORASTOR) capsule 250 mg  250 mg Oral BID Ramón Moss MD   250 mg at 01/11/17 1717   • sennosides-docusate sodium (SENOKOT-S) 8.6-50 MG tablet 2 tablet  2 tablet Oral Nightly Bogdan Palomares MD   2 tablet at 01/10/17  2031   • sodium chloride 0.9 % flush 1-10 mL  1-10 mL Intravenous PRN Bogdan Palomares MD       • sodium chloride 0.9 % flush 10 mL  10 mL Intravenous PRN Fran Schrader MD       • warfarin (COUMADIN) tablet 3 mg  3 mg Oral Daily Daphne Medina, APRN   3 mg at 01/11/17 1717     Prescriptions Prior to Admission   Medication Sig Dispense Refill Last Dose   • acetaminophen (TYLENOL) 325 MG tablet 650 mg by Enteral route every 6 (six) hours as needed (for pain/elevated temperature). GIVEN PER FEEDING TUBE   not needed   • bisacodyl (DULCOLAX) 10 MG suppository Insert 10 mg into the rectum daily as needed for constipation.   not needed   • dextrose (GLUTOSE) 40 % gel Take 15 g by mouth as needed for low blood sugar (may give for BS<60 & symptomatic).   not needed   • escitalopram (LEXAPRO) 10 MG tablet 10 mg by Enteral route daily. GIVEN PER FEEDING TUBE   6/7/2016 at 2100   • ferrous sulfate 300 (60 FE) MG/5ML syrup 325 mg by Enteral route daily. PER FEEDING TUBE   6/7/2016 at 2100   • glucagon (GLUCAGEN) 1 MG injection Inject 1 mg into the shoulder, thigh, or buttocks 1 (one) time as needed for low blood sugar (give for BS<60 & symptomatic, may repeat x2 with 10mins apart).   not needed   • levETIRAcetam (KEPPRA) 500 MG tablet 500 mg by Enteral route 2 (two) times a day. GIVEN AT 0900 & 2100 PER FEEDING TUBE   6/7/2016 at 2100   • magnesium hydroxide (MILK OF MAGNESIA) 400 MG/5ML suspension 30 mL by Enteral route daily as needed for constipation. PER FEEDING TUBE   not needed   • metFORMIN (GLUCOPHAGE) 1000 MG tablet 1,000 mg by Enteral route 2 (two) times a day with meals. given at 0730 & 1630 PER FEEDING TUBE   6/7/2016 at 1630   • phenytoin (DILANTIN) 125 MG/5ML suspension Take 300 mg by mouth Every Night.      • sennosides-docusate sodium (SENOKOT-S) 8.6-50 MG tablet 2 tablets by Enteral route every night. GIVEN PER FEEDING TUBE   6/7/2016 at 2100   • simvastatin (ZOCOR) 10 MG tablet 10 mg by Enteral route every  night. GIVEN PER FEEDING TUBE   6/7/2016 at 2100   • warfarin (COUMADIN) 4 MG tablet Take 4 mg by mouth Daily.      • phenytoin (DILANTIN) 100 MG ER capsule 200 mg by Enteral route every night. GIVEN PER FEEDING TUBE   6/7/2016 at 2100   • warfarin (COUMADIN) 3 MG tablet 4 mg by Enteral route daily. Give 1 tablet by mouth at bedtime related to unspecified atrial fibrillation PER FEEDING TUBE  HOLDING FOR SURGERY   6/3/2016       Assessment:    Active Problems:    UTI (urinary tract infection), bacterial          Plan:Continue current treatment plans L[facility] today-cleared IV team concern    Dylan Cuba MD  01/12/17  10:22 AM

## 2017-01-12 NOTE — PROGRESS NOTES
Continued Stay Note  [facility]     Patient Name: Servando Kapadia  MRN: [id number]  Today's Date: 1/12/2017    Admit Date: 1/6/2017          Discharge Plan       01/12/17 1115    Case Management/Social Work Plan    Plan [facility] with IV ATB    Patient/Family In Agreement With Plan yes    Additional Comments Called to [facility] to inform of possible DC with IV ATB tomorrow. She will contact facility and make sure IV ATB OK to accept back with.               Discharge Codes     None            Byron Vegas RN

## 2017-01-13 VITALS
TEMPERATURE: 98.7 F | DIASTOLIC BLOOD PRESSURE: 88 MMHG | OXYGEN SATURATION: 99 % | SYSTOLIC BLOOD PRESSURE: 116 MMHG | RESPIRATION RATE: 20 BRPM | HEART RATE: 73 BPM | WEIGHT: 186 LBS | HEIGHT: 65 IN | BODY MASS INDEX: 30.99 KG/M2

## 2017-01-13 LAB
ANION GAP SERPL CALCULATED.3IONS-SCNC: 12.6 MMOL/L
BACTERIA SPEC AEROBE CULT: NORMAL
BACTERIA SPEC AEROBE CULT: NORMAL
BASOPHILS # BLD AUTO: 0.02 10*3/MM3 (ref 0–0.2)
BASOPHILS NFR BLD AUTO: 0.3 % (ref 0–1.5)
BUN BLD-MCNC: 4 MG/DL (ref 6–20)
BUN/CREAT SERPL: 6.6 (ref 7–25)
CALCIUM SPEC-SCNC: 8.7 MG/DL (ref 8.6–10.5)
CHLORIDE SERPL-SCNC: 105 MMOL/L (ref 98–107)
CO2 SERPL-SCNC: 23.4 MMOL/L (ref 22–29)
CREAT BLD-MCNC: 0.61 MG/DL (ref 0.76–1.27)
DEPRECATED RDW RBC AUTO: 45.4 FL (ref 37–54)
EOSINOPHIL # BLD AUTO: 0.24 10*3/MM3 (ref 0–0.7)
EOSINOPHIL NFR BLD AUTO: 3.4 % (ref 0.3–6.2)
ERYTHROCYTE [DISTWIDTH] IN BLOOD BY AUTOMATED COUNT: 15.1 % (ref 11.5–14.5)
GFR SERPL CREATININE-BSD FRML MDRD: 137 ML/MIN/1.73
GFR SERPL CREATININE-BSD FRML MDRD: >150 ML/MIN/1.73
GLUCOSE BLD-MCNC: 102 MG/DL (ref 65–99)
GLUCOSE BLDC GLUCOMTR-MCNC: 145 MG/DL (ref 70–130)
GLUCOSE BLDC GLUCOMTR-MCNC: 94 MG/DL (ref 70–130)
HCT VFR BLD AUTO: 40.7 % (ref 40.4–52.2)
HGB BLD-MCNC: 12.9 G/DL (ref 13.7–17.6)
IMM GRANULOCYTES # BLD: 0.03 10*3/MM3 (ref 0–0.03)
IMM GRANULOCYTES NFR BLD: 0.4 % (ref 0–0.5)
INR PPP: 2.5 (ref 0.9–1.1)
LYMPHOCYTES # BLD AUTO: 3.04 10*3/MM3 (ref 0.9–4.8)
LYMPHOCYTES NFR BLD AUTO: 42.8 % (ref 19.6–45.3)
MAGNESIUM SERPL-MCNC: 1.6 MG/DL (ref 1.6–2.6)
MCH RBC QN AUTO: 26.2 PG (ref 27–32.7)
MCHC RBC AUTO-ENTMCNC: 31.7 G/DL (ref 32.6–36.4)
MCV RBC AUTO: 82.7 FL (ref 79.8–96.2)
MONOCYTES # BLD AUTO: 0.71 10*3/MM3 (ref 0.2–1.2)
MONOCYTES NFR BLD AUTO: 10 % (ref 5–12)
NEUTROPHILS # BLD AUTO: 3.07 10*3/MM3 (ref 1.9–8.1)
NEUTROPHILS NFR BLD AUTO: 43.1 % (ref 42.7–76)
PLATELET # BLD AUTO: 220 10*3/MM3 (ref 140–500)
PMV BLD AUTO: 9.6 FL (ref 6–12)
POTASSIUM BLD-SCNC: 3.4 MMOL/L (ref 3.5–5.2)
PROTHROMBIN TIME: 26.2 SECONDS (ref 11.7–14.2)
RBC # BLD AUTO: 4.92 10*6/MM3 (ref 4.6–6)
SODIUM BLD-SCNC: 141 MMOL/L (ref 136–145)
WBC NRBC COR # BLD: 7.11 10*3/MM3 (ref 4.5–10.7)

## 2017-01-13 PROCEDURE — 85025 COMPLETE CBC W/AUTO DIFF WBC: CPT | Performed by: FAMILY MEDICINE

## 2017-01-13 PROCEDURE — 85610 PROTHROMBIN TIME: CPT | Performed by: INTERNAL MEDICINE

## 2017-01-13 PROCEDURE — 83735 ASSAY OF MAGNESIUM: CPT | Performed by: INTERNAL MEDICINE

## 2017-01-13 PROCEDURE — 80048 BASIC METABOLIC PNL TOTAL CA: CPT | Performed by: FAMILY MEDICINE

## 2017-01-13 PROCEDURE — 82962 GLUCOSE BLOOD TEST: CPT

## 2017-01-13 RX ORDER — SACCHAROMYCES BOULARDII 250 MG
250 CAPSULE ORAL 2 TIMES DAILY
Status: ON HOLD
Start: 2017-01-13 | End: 2021-10-07 | Stop reason: SDUPTHER

## 2017-01-13 RX ORDER — NITROGLYCERIN 0.4 MG/1
0.4 TABLET SUBLINGUAL
Refills: 12
Start: 2017-01-13

## 2017-01-13 RX ORDER — AMLODIPINE BESYLATE 5 MG/1
5 TABLET ORAL
Status: ON HOLD
Start: 2017-01-13 | End: 2021-09-21

## 2017-01-13 RX ORDER — LEVETIRACETAM 500 MG/1
500 TABLET ORAL EVERY 12 HOURS SCHEDULED
Start: 2017-01-13 | End: 2021-10-07 | Stop reason: HOSPADM

## 2017-01-13 RX ADMIN — METFORMIN HYDROCHLORIDE 1000 MG: 1000 TABLET ORAL at 09:01

## 2017-01-13 RX ADMIN — OSELTAMIVIR PHOSPHATE 75 MG: 75 CAPSULE ORAL at 09:01

## 2017-01-13 RX ADMIN — LEVETIRACETAM 500 MG: 500 TABLET, FILM COATED ORAL at 09:01

## 2017-01-13 RX ADMIN — MINERAL SUPPLEMENT IRON 300 MG / 5 ML STRENGTH LIQUID 100 PER BOX UNFLAVORED 325 MG: at 09:02

## 2017-01-13 RX ADMIN — PHENYTOIN 200 MG: 125 SUSPENSION ORAL at 09:01

## 2017-01-13 RX ADMIN — CEFEPIME 2 G: 2 INJECTION, POWDER, FOR SOLUTION INTRAVENOUS at 00:18

## 2017-01-13 RX ADMIN — DOCUSATE SODIUM 100 MG: 100 CAPSULE, LIQUID FILLED ORAL at 09:01

## 2017-01-13 RX ADMIN — AMLODIPINE BESYLATE 5 MG: 5 TABLET ORAL at 09:01

## 2017-01-13 RX ADMIN — CEFEPIME 2 G: 2 INJECTION, POWDER, FOR SOLUTION INTRAVENOUS at 11:20

## 2017-01-13 RX ADMIN — OSELTAMIVIR PHOSPHATE 75 MG: 75 CAPSULE ORAL at 15:42

## 2017-01-13 RX ADMIN — METOPROLOL TARTRATE 25 MG: 25 TABLET ORAL at 09:01

## 2017-01-13 RX ADMIN — ESCITALOPRAM 10 MG: 10 TABLET, FILM COATED ORAL at 09:01

## 2017-01-13 RX ADMIN — Medication 250 MG: at 09:00

## 2017-01-13 NOTE — PLAN OF CARE
Problem: Sepsis (Adult)  Intervention: Prevent Infection Progression    01/12/17 1758   Safety Interventions   Isolation Precautions droplet precautions maintained   Infection Prevention barrier precautions utilized;personal protective equipment utilized;rest/sleep promoted         Pt will discharge today on IV ABX per PICC line.     Goal: Signs and Symptoms of Listed Potential Problems Will be Absent or Manageable (Sepsis)  Outcome: Ongoing (interventions implemented as appropriate)    01/13/17 0312   Sepsis   Problems Assessed (Sepsis) all   Problems Present (Sepsis) situational response         Problem: Fall Risk (Adult)  Intervention: Monitor/Assist with Self Care    01/09/17 2000 01/12/17 1759 01/13/17 0855   Monitor/Assist with Self Care   Ambulation --  --  4-->completely dependent   Transferring --  --  4-->completely dependent   Toileting --  --  4-->completely dependent   Bathing --  --  4-->completely dependent   Dressing --  --  4-->completely dependent   Eating --  --  2-->assistive person   Communication --  --  2-->difficulty speaking (not related to language barrier)   Swallowing (if score 2 or more for any item, consult Rehab Services) --  --  2-->difficulty swallowing liquids   Activity   Activity Type --  --  bedrest   Activity Level of Assistance --  assistance, 2 people --    Musculoskeletal Interventions   Self-Care Promotion BADL personal objects within reach --  --        Intervention: Reduce Risk/Promote Restraint Free Environment    01/13/17 1010   Safety Interventions   Safety/Security Measures bed alarm set   Environmental Safety Modification assistive device/personal items within reach;clutter free environment maintained;lighting adjusted;room organization consistent;room near unit station   Restraint Interventions   Safety Promotion/Fall Prevention safety round/check completed;fall prevention program maintained;muscle strengthening facilitated;nonskid shoes/slippers when out of  bed;supervised activity         Goal: Absence of Falls  Outcome: Ongoing (interventions implemented as appropriate)    01/13/17 0312   Fall Risk (Adult)   Absence of Falls making progress toward outcome

## 2017-01-13 NOTE — PROGRESS NOTES
Continued Stay Note  [facility]     Patient Name: Servando Kapadia  MRN: [id number]  Today's Date: 1/13/2017    Admit Date: 1/6/2017          Discharge Plan       01/13/17 1203    Case Management/Social Work Plan    Plan [facility] with IV ATB    Patient/Family In Agreement With Plan yes    Additional Comments Attempted to speak with patient re: DC plans and he is very drowsy and history of CVA. Called to brother Savita Kapadia/JAVID and he is agreeable to patient returning to [facility]. Have already faxed DC Summary to facility. Packet completed and given to RN. Spoke with brother earlier and they plan EMS transport and no preference to provider. Disclaimer has been given. IMM explained to brother  Savita and he has no questions. He requests I send a copy to the NH with the patient's papers for him (Done). Brother called and informed of  time.               Discharge Codes     None        Expected Discharge Date and Time     Expected Discharge Date Expected Discharge Time    Jan 13, 2017             Byron Vegas, RN

## 2017-01-13 NOTE — PLAN OF CARE
Problem: Patient Care Overview (Adult)  Goal: Plan of Care Review  Outcome: Ongoing (interventions implemented as appropriate)    01/13/17 0312   Coping/Psychosocial Response Interventions   Plan Of Care Reviewed With patient   Patient Care Overview   Progress improving   Outcome Evaluation   Outcome Summary/Follow up Plan pt received picc line today. continue iv antibiotics. resting comfortably.       Goal: Adult Individualization and Mutuality  Outcome: Ongoing (interventions implemented as appropriate)    01/09/17 0558   Individualization   Patient Specific Goals pt labs will be in wnl, pt will be able to tolerate baseline diet, pt will be siezure free   Patient Specific Interventions npo, siesure precautions, monitor labs and vitals       Goal: Discharge Needs Assessment  Outcome: Ongoing (interventions implemented as appropriate)    01/10/17 0033 01/13/17 0312   Discharge Needs Assessment   Concerns To Be Addressed --  basic needs concerns   Concerns Comments will return to [facility] at discharge --    Discharge Disposition --  still a patient         Problem: Sepsis (Adult)  Goal: Signs and Symptoms of Listed Potential Problems Will be Absent or Manageable (Sepsis)  Outcome: Ongoing (interventions implemented as appropriate)    01/13/17 0312   Sepsis   Problems Assessed (Sepsis) all   Problems Present (Sepsis) situational response         Problem: Fall Risk (Adult)  Goal: Absence of Falls  Outcome: Ongoing (interventions implemented as appropriate)    01/13/17 0312   Fall Risk (Adult)   Absence of Falls making progress toward outcome         Problem: Dysphagia (Adult)  Goal: Identify Related Risk Factors and Signs and Symptoms  Outcome: Ongoing (interventions implemented as appropriate)  Goal: Functional/Safe Swallow  Outcome: Ongoing (interventions implemented as appropriate)    01/13/17 0312   Dysphagia (Adult)   Functional/Safe Swallow making progress toward outcome       Goal: Compensatory Techniques to  Improve Safety/Function with Swallowing  Outcome: Ongoing (interventions implemented as appropriate)    01/13/17 0312   Dysphagia (Adult)   Compensatory Techniques to Improve Safety/Function with Swallowing making progress toward outcome

## 2017-01-13 NOTE — PROGRESS NOTES
"[facility]  Cardiology Progress Note    Patient Identification:  Name: Servando Kapadia  Age: 56 y.o.  Sex: male  :  1960  MRN: [id number]                 Follow Up / Chief Complaint: Paroxysmal A. fib, anticoagulation    Interval History:  56-year-old male with history of paroxysmal A. fib on chronic anticoagulation who presented from nursing facility following a seizure with fever of 102, uncontrolled hypertension, influenza A, UTI and possible bacteremia. He had a brief episode of paroxysmal A. fib with RVR last evening in the setting of magnesium level I.2.      Subjective:  Awake and alert today. Denies chest pain, palpitations or shortness of breath \"feel a lot better\"    Objective:  HR >60's without further arrhythmia or ectopy.  Diastolic ini the 80's   INR 2.87   H/H stable      Past Medical History:  Past Medical History   Diagnosis Date   • Anemia    • Atrial fibrillation    • Atrial fibrillation    • Benign prostatic hyperplasia with lower urinary tract symptoms    • Cerebral infarct    • Chronic obstructive pyelonephritis    • Constipation    • Depression    • Diabetes mellitus      type 2   • Enlarged prostate    • Hemiplegia and hemiparesis    • Hyperlipidemia    • Hypertension    • Kidney stone    • Seizures    • Sleep apnea    • Stroke      apparent right side residual weakness   • Urinary incontinence      Past Surgical History:  Past Surgical History   Procedure Laterality Date   • Cystoscopy w/ ureteral stent placement N/A 5/3/2016     Procedure: CYSTOSCOPY LASER LITHOTRIPSY LEFT RETROGRADE URETERAL CATHETER AND STENT PLACEMENT;  Surgeon: Eduin Brennan MD;  [facility];  Service:    • Endoscopy w/ peg tube placement N/A 2016     Procedure: ESOPHAGOGASTRODUODENOSCOPY WITH PERCUTANEOUS ENDOSCOPIC GASTROSTOMY TUBE INSERTION;  Surgeon: Lion Weber MD;  Location: [facility] ENDOSCOPY;  Service:    • Ureteroscopy laser lithotripsy with stent insertion N/A " 6/8/2016     Procedure: URETEROSCOPY LASER LITHOTRIPSY STONE EXTRACTION WITH JJ STENT INSERTION;  Surgeon: Eduin Brennan MD;  Location: [facility];  Service:         Social History:   Social History   Substance Use Topics   • Smoking status: Never Smoker   • Smokeless tobacco: Never Used      Comment: pt unable to answer questions   • Alcohol use No      Family History:  History reviewed. No pertinent family history.       Allergies:  No Known Allergies  Scheduled Meds:    amLODIPine 5 mg Q24H   cefepime 2 g Q12H   docusate sodium 100 mg BID   escitalopram 10 mg Daily   ferrous sulfate 325 mg Daily   levETIRAcetam 500 mg Q12H   metFORMIN 1,000 mg BID With Meals   metoprolol tartrate 25 mg Q12H   oseltamivir 75 mg Q12H   phenytoin 200 mg Daily   phenytoin 300 mg Nightly   saccharomyces boulardii 250 mg BID   sennosides-docusate sodium 2 tablet Nightly   warfarin 3 mg Daily           INTAKE AND OUTPUT:    Intake/Output Summary (Last 24 hours) at 01/12/17 2136  Last data filed at 01/12/17 1042   Gross per 24 hour   Intake    460 ml   Output      0 ml   Net    460 ml       Review of Systems:   GI:  No n/v  Cardiac:  No further ectopy  Pulmonary:  No SOA or cough    Constitutional:  Temp:  [97.2 °F (36.2 °C)-97.9 °F (36.6 °C)] 97.2 °F (36.2 °C)  Heart Rate:  [64-74] 64  Resp:  [16-20] 20  BP: (123-160)/() 123/85    Physical Exam by Rolf Daniel MD  General: Awake and alert Appears in no acute distress  Eyes: PRTL,  HEENT:  Thyroid not visibly enlarged. No mucosal cyanosis  Respiratory: Respirations regular and unlabored at rest.  Slightly diminished air entry in bases.  No  wheezes auscultated  Cardiovascular: S1S2 Regular rate and rhythm. No gallop  No pretibial pitting edema with TEDs  Gastrointestinal: Abdomen soft, non tender. Bowel sounds present.   Musculoskeletal: CHRISTIANSON x4. No abnormal movements  Extremities: No digital cyanosis  Skin: Skin warm and dry to touch.   Neuro: AAO x2 -3  Psych:  Merged with [facility]            Cardiographics  Telemetry: SR 60's-80's, no ectopy      Echocardiogram:   · TDS , SUBOPTIMAL STUDY  · All left ventricular wall segments contract normally.  · Left Ventricle: Calculated EF = 59.4%  · There is no evidence of pericardial effusion.  · BUBBLE STUDY NEG FOR PFO      Results from last 7 days  Lab Units 01/12/17  0544   SODIUM mmol/L 142   POTASSIUM mmol/L 3.4*   BUN mg/dL 3*   CREATININE mg/dL 0.52*   CALCIUM mg/dL 8.8       Results from last 7 days  Lab Units 01/12/17  0544 01/11/17  0425 01/10/17  0359   WBC 10*3/mm3 6.11 6.40 6.91   HEMOGLOBIN g/dL 13.1* 12.4* 12.6*   HEMATOCRIT % 41.4 39.5* 39.8*   PLATELETS 10*3/mm3 229 237 221       Results from last 7 days  Lab Units 01/12/17  0544 01/11/17  0425 01/10/17  0359   INR  2.33* 2.87* 2.55*         Assessment:  - PAF rvr    - s/p Hypomagnesia  - h/o PAF  - elevated [id number] score -> on Coumadin for INR therapeutic  - HTN  - Dyslipidemia  - eColi UTI -> ID  - Strep bacteremia -> ID  - Seizure -> Neurology  - Febrile illness -> ID  - Influenza A -> ID  - h/o CVA -> Neurology  - h/o seizure disorder -> Neurology     1/11    Plan:  - PAF rvr . No recurrence.  No MI  TSH normal.  Magnesium replaced per protocol. Continue beta blocker    - s/p hypomagnesia - magnesium level stable at I.7 after replacement per protocol    - h/o PAF - elevated [id number] score ->   INR 2.87. Dose Coumadin to keep INR 2.0-3.0 range    - HTN -  Improved control on lopressor and Norvasc      Continue current Lopressor and Norvasc  Dose Coumadin to maintain INR 2.0-3.0 range.  No further ischemic work up planned at this time. Stable cardiac status. INR ordered in am    I reviewed the patient's new clinical results and treatment plan with Dr Crum. I personally viewed and interpreted the patient's EKG/Telemetry data    1/12    Patient being treated for influenza a, cystitis due to Escherichia coli, and sepsis due to strep salivarius;  followed by ID.    Patient has history of paroxysmal atrial fibrillation and is on Coumadin and beta blocker.  She is not on an antiarrhythmic agent.  INR is 2.33 today.  Continue Coumadin as written.    Blood pressure is well-controlled on present medication.    We'll recheck magnesium level after replacement therapy.    )1/12/2017  MD YANCI Simon Dragon/Transcription disclaimer:   Much of this encounter note is an electronic transcription/translation of spoken language to printed text. The electronic translation of spoken language may permit erroneous, or at times, nonsensical words or phrases to be inadvertently transcribed; Although I have reviewed the note for such errors, some may still exist.

## 2017-01-13 NOTE — DISCHARGE SUMMARY
Date of Discharge:  1/13/2017  Presenting Problem/History of Present Illness  UTI (urinary tract infection), bacterial [N39.0, A49.9]  Type A influenza [J10.1]  Sepsis, due to unspecified organism [A41.9]    Discharge Diagnosis: 1) Flu A-but this is questionable  2) Strep Bacteremia ?? From teeth  3) Ecoli UTI  4) dysphagia  50DEPRESSION  6) ESSENTIAL HYPERTENSION  7)SEIZURES  8) dm2  9)COUMADIN NTICOAGULATION    Hospital Course  Patient is a 56 y.o. male presented with  ? SEIZURE AND POORLY RESPONSIVE. Ms RETURNED TO [facility] WITH ANTIBIOTICS.No evidence of firtjher seizures and MS back to baseline. One blood culture + for strep ? From teeth. Needs to see Dentist      Procedures Performed          Consults:   Consults     Date and Time Order Name Status Description    1/7/2017 2041 Inpatient Consult to Neurology Completed     1/7/2017 2040 Inpatient Consult to Cardiology Completed     1/7/2017 2040 Inpatient Consult to Urology Completed     1/7/2017 0637 Inpatient Consult to Infectious Diseases Completed     1/7/2017 0155 Family Medicine Consult Completed           Pertinent Test Results:   Lab Results (last 7 days)     Procedure Component Value Units Date/Time    CBC & Differential [[id number]] Collected:  01/07/17 0033    Specimen:  Blood Updated:  01/07/17 0050    Narrative:       The following orders were created for panel order CBC & Differential.  Procedure                               Abnormality         Status                     ---------                               -----------         ------                     CBC Auto Differential[[id number]]         Abnormal            Final result                 Please view results for these tests on the individual orders.    CBC Auto Differential [[id number]]  (Abnormal) Collected:  01/07/17 0033    Specimen:  Blood Updated:  01/07/17 0050     WBC 12.65 (H) 10*3/mm3      RBC 5.60 10*6/mm3      Hemoglobin 14.9 g/dL      Hematocrit 47.4 %      MCV 84.6 fL      MCH 26.6 (L)  pg      MCHC 31.4 (L) g/dL      RDW 14.6 (H) %      RDW-SD 44.9 fl      MPV 10.3 fL      Platelets 211 10*3/mm3      Neutrophil % 89.7 (H) %      Lymphocyte % 7.2 (L) %      Monocyte % 2.4 (L) %      Eosinophil % 0.4 %      Basophil % 0.1 %      Immature Grans % 0.2 %      Neutrophils, Absolute 11.35 (H) 10*3/mm3      Lymphocytes, Absolute 0.91 10*3/mm3      Monocytes, Absolute 0.30 10*3/mm3      Eosinophils, Absolute 0.05 10*3/mm3      Basophils, Absolute 0.01 10*3/mm3      Immature Grans, Absolute 0.03 10*3/mm3     Urinalysis With / Culture If Indicated [[id number]]  (Abnormal) Collected:  01/07/17 0042    Specimen:  Urine from Urine, Catheter Updated:  01/07/17 0054     Color, UA Yellow      Appearance, UA Cloudy (A)      pH, UA 5.5      Specific Gravity, UA 1.014      Glucose, UA Negative      Ketones, UA Negative      Bilirubin, UA Negative      Blood, UA Large (3+) (A)      Protein, UA 30 mg/dL (1+) (A)      Leuk Esterase, UA Moderate (2+) (A)      Nitrite, UA Negative      Urobilinogen, UA 0.2 E.U./dL     Influenza Antigen [[id number]]  (Abnormal) Collected:  01/07/17 0038    Specimen:  Swab from Nasopharynx Updated:  01/07/17 0058     Influenza A Ag, EIA Positive (A)      Influenza B Ag, EIA Negative     Protime-INR [[id number]]  (Abnormal) Collected:  01/07/17 0033    Specimen:  Blood Updated:  01/07/17 0059     Protime 24.2 (H) Seconds      INR 2.26 (H)     Urinalysis, Microscopic Only [[id number]]  (Abnormal) Collected:  01/07/17 0042    Specimen:  Urine from Urine, Catheter Updated:  01/07/17 0105     RBC, UA Too Numerous to Count (A) /HPF      WBC, UA Too Numerous to Count (A) /HPF      Bacteria, UA 4+ (A) /HPF      Squamous Epithelial Cells, UA 0-2 /HPF      Hyaline Casts, UA 3-6 /LPF      Methodology Manual Light Microscopy     Procalcitonin [[id number]]  (Abnormal) Collected:  01/07/17 0033    Specimen:  Blood Updated:  01/07/17 0115     Procalcitonin 0.47 (H) ng/mL     Narrative:       As a Marker for Sepsis  "(Non-Neonates):   1. <0.5 ng/mL represents a low risk of severe sepsis and/or septic shock.  1. >2 ng/mL represents a high risk of severe sepsis and/or septic shock.    As a Marker for Lower Respiratory Tract Infections that require antibiotic therapy:  PCT on Admission     Antibiotic Therapy             6-12 Hrs later  > 0.5                Strongly Recommended            >0.25 - <0.5         Recommended  0.1 - 0.25           Discouraged                   Remeasure/reassess PCT  <0.1                 Strongly Discouraged          Remeasure/reassess PCT      As 28 day mortality risk marker: \"Change in Procalcitonin Result\" (> 80 % or <=80 %) if Day 0 (or Day 1) and Day 4 values are available. Refer to [email or web address]-[email or web address]/   Change in PCT <=80 %   A decrease of PCT levels below or equal to 80 % defines a positive change in PCT test result representing a higher risk for 28-day all-cause mortality of patients diagnosed with severe sepsis or septic shock.  Change in PCT > 80 %   A decrease of PCT levels of more than 80 % defines a negative change in PCT result representing a lower risk for 28-day all-cause mortality of patients diagnosed with severe sepsis or septic shock.                Comprehensive Metabolic Panel [[id number]]  (Abnormal) Collected:  01/07/17 0033    Specimen:  Blood Updated:  01/07/17 0127     Glucose 127 (H) mg/dL      BUN 11 mg/dL      Creatinine 0.78 mg/dL      Sodium 142 mmol/L      Potassium 3.9 mmol/L      Chloride 101 mmol/L      CO2 21.2 (L) mmol/L      Calcium 9.3 mg/dL      Total Protein 7.6 g/dL      Albumin 4.00 g/dL      ALT (SGPT) 27 U/L      AST (SGOT) 16 U/L      Alkaline Phosphatase 98 U/L      Total Bilirubin 0.4 mg/dL      eGFR Non African Amer 103 mL/min/1.73      eGFR  African Amer 125 mL/min/1.73      Globulin 3.6 gm/dL      A/G Ratio 1.1 g/dL      BUN/Creatinine Ratio 14.1      Anion Gap 19.8 mmol/L     Phenytoin Level, Total [[id number]]  (Abnormal) Collected:  " 01/07/17 0033    Specimen:  Blood Updated:  01/07/17 0128     Phenytoin Level 7.2 (L) mcg/mL     Lactic Acid, Plasma [[id number]]  (Abnormal) Collected:  01/07/17 0103    Specimen:  Blood Updated:  01/07/17 0130     Lactate 3.3 (C) mmol/L     [place] Draw [[id number]] Collected:  01/07/17 0033    Specimen:  Blood Updated:  01/07/17 0501    Narrative:       The following orders were created for panel order [place] Draw.  Procedure                               Abnormality         Status                     ---------                               -----------         ------                     Light Blue Top[[id number]]                                    Final result               Green Top (Gel)[[id number]]                                   Final result               Lavender Top[[id number]]                                      Final result               Gold Top - SST[[id number]]                                    Final result                 Please view results for these tests on the individual orders.    Light Blue Top [[id number]] Collected:  01/07/17 0033    Specimen:  Blood Updated:  01/07/17 0501     Extra Tube hold for add-on       Auto resulted       Green Top (Gel) [[id number]] Collected:  01/07/17 0033    Specimen:  Blood Updated:  01/07/17 0501     Extra Tube Hold for add-ons.       Auto resulted.       Lavender Top [[id number]] Collected:  01/07/17 0033    Specimen:  Blood Updated:  01/07/17 0501     Extra Tube hold for add-on       Auto resulted       Gold Top - SST [[id number]] Collected:  01/07/17 0033    Specimen:  Blood Updated:  01/07/17 0501     Extra Tube Hold for add-ons.       Auto resulted.       Lactate Acid, Reflex [[id number]]  (Abnormal) Collected:  01/07/17 0548    Specimen:  Blood Updated:  01/07/17 0630     Lactate 2.6 (C) mmol/L     CBC & Differential [[id number]] Collected:  01/07/17 0732    Specimen:  Blood Updated:  01/07/17 0752    Narrative:       The following orders were created for panel order CBC &  Differential.  Procedure                               Abnormality         Status                     ---------                               -----------         ------                     CBC Auto Differential[[id number]]         Abnormal            Final result                 Please view results for these tests on the individual orders.    CBC Auto Differential [[id number]]  (Abnormal) Collected:  01/07/17 0732    Specimen:  Blood Updated:  01/07/17 0752     WBC 22.73 (H) 10*3/mm3      RBC 4.77 10*6/mm3      Hemoglobin 12.7 (L) g/dL      Hematocrit 39.7 (L) %      MCV 83.2 fL      MCH 26.6 (L) pg      MCHC 32.0 (L) g/dL      RDW 14.7 (H) %      RDW-SD 44.5 fl      MPV 10.2 fL      Platelets 196 10*3/mm3      Neutrophil % 84.1 (H) %      Lymphocyte % 8.3 (L) %      Monocyte % 7.0 %      Eosinophil % 0.0 (L) %      Basophil % 0.1 %      Immature Grans % 0.5 %      Neutrophils, Absolute 19.10 (H) 10*3/mm3      Lymphocytes, Absolute 1.89 10*3/mm3      Monocytes, Absolute 1.59 (H) 10*3/mm3      Eosinophils, Absolute 0.01 10*3/mm3      Basophils, Absolute 0.02 10*3/mm3      Immature Grans, Absolute 0.12 (H) 10*3/mm3     Lactic Acid, Plasma [[id number]]  (Abnormal) Collected:  01/07/17 0732    Specimen:  Blood Updated:  01/07/17 0757     Lactate 2.3 (C) mmol/L     Protime-INR [[id number]]  (Abnormal) Collected:  01/07/17 0732    Specimen:  Blood Updated:  01/07/17 0757     Protime 25.8 (H) Seconds      INR 2.46 (H)     BNP [[id number]]  (Abnormal) Collected:  01/07/17 0732    Specimen:  Blood Updated:  01/07/17 0816     proBNP 1066.0 (H) pg/mL     Narrative:       Among patients with dyspnea, NT-proBNP is highly sensitive for the detection of acute congestive heart failure. In addition NT-proBNP of <300 pg/ml effectively rules out acute congestive heart failure with 99% negative predictive value.    TSH [[id number]]  (Normal) Collected:  01/07/17 0732    Specimen:  Blood Updated:  01/07/17 0816     TSH 1.130 mIU/mL      "Procalcitonin [[id number]]  (Abnormal) Collected:  01/07/17 0732    Specimen:  Blood Updated:  01/07/17 0827     Procalcitonin 10.09 (C) ng/mL     Narrative:       As a Marker for Sepsis (Non-Neonates):   1. <0.5 ng/mL represents a low risk of severe sepsis and/or septic shock.  1. >2 ng/mL represents a high risk of severe sepsis and/or septic shock.    As a Marker for Lower Respiratory Tract Infections that require antibiotic therapy:  PCT on Admission     Antibiotic Therapy             6-12 Hrs later  > 0.5                Strongly Recommended            >0.25 - <0.5         Recommended  0.1 - 0.25           Discouraged                   Remeasure/reassess PCT  <0.1                 Strongly Discouraged          Remeasure/reassess PCT      As 28 day mortality risk marker: \"Change in Procalcitonin Result\" (> 80 % or <=80 %) if Day 0 (or Day 1) and Day 4 values are available. Refer to [email or web address][email or web address]   Change in PCT <=80 %   A decrease of PCT levels below or equal to 80 % defines a positive change in PCT test result representing a higher risk for 28-day all-cause mortality of patients diagnosed with severe sepsis or septic shock.  Change in PCT > 80 %   A decrease of PCT levels of more than 80 % defines a negative change in PCT result representing a lower risk for 28-day all-cause mortality of patients diagnosed with severe sepsis or septic shock.                Basic Metabolic Panel [[id number]]  (Abnormal) Collected:  01/07/17 0732    Specimen:  Blood Updated:  01/07/17 0828     Glucose 157 (H) mg/dL      BUN 10 mg/dL      Creatinine 0.75 (L) mg/dL      Sodium 140 mmol/L      Potassium 3.7 mmol/L      Chloride 105 mmol/L      CO2 22.1 mmol/L      Calcium 8.2 (L) mg/dL      eGFR  African Amer 131 mL/min/1.73      eGFR Non African Amer 108 mL/min/1.73      BUN/Creatinine Ratio 13.3      Anion Gap 12.9 mmol/L     Narrative:       GFR Normal >60  Chronic Kidney Disease <60  Kidney Failure <15    Lipid " Panel [[id number]] Collected:  01/07/17 0732    Specimen:  Blood Updated:  01/07/17 0828     Total Cholesterol 133 mg/dL      Triglycerides 129 mg/dL      HDL Cholesterol 51 mg/dL      LDL Cholesterol  56 mg/dL      VLDL Cholesterol 25.8 mg/dL      LDL/HDL Ratio 1.10     Narrative:       Cholesterol Reference Ranges  (U.S. Department of Health and Human Services ATP III Classifications)    Desirable          <200 mg/dL  Borderline High    200-239 mg/dL  High Risk          >240 mg/dL      Triglyceride Reference Ranges  (U.S. Department of Health and Human Services ATP III Classifications)    Normal           <150 mg/dL  Borderline High  150-199 mg/dL  High             200-499 mg/dL  Very High        >500 mg/dL    HDL Reference Ranges  (U.S. Department of Health and Human Services ATP III Classifcations)    Low     <40 mg/dl (major risk factor for CHD)  High    >60 mg/dl ('negative' risk factor for CHD)        LDL Reference Ranges  (U.S. Department of Health and Human Services ATP III Classifcations)    Optimal          <100 mg/dL  Near Optimal     100-129 mg/dL  Borderline High  130-159 mg/dL  High             160-189 mg/dL  Very High        >189 mg/dL    Phosphorus [[id number]]  (Abnormal) Collected:  01/07/17 0732    Specimen:  Blood Updated:  01/07/17 0828     Phosphorus 2.2 (L) mg/dL     Magnesium [[id number]]  (Abnormal) Collected:  01/07/17 0732    Specimen:  Blood Updated:  01/07/17 0832     Magnesium 1.2 (C) mg/dL     Respiratory Panel, PCR [[id number]]  (Normal) Collected:  01/07/17 1127    Specimen:  Swab from Nasopharynx Updated:  01/07/17 1437     ADENOVIRUS, PCR Not Detected      Coronavirus 229E Not Detected      Coronavirus HKU1 Not Detected      Coronavirus NL63 Not Detected      Coronavirus OC43 Not Detected      Human Metapneumovirus Not Detected      Human Rhinovirus/Enterovirus Not Detected      Influenza B PCR Not Detected      Parainfluenza Virus 1 Not Detected      Parainfluenza Virus 2 Not Detected       Parainfluenza Virus 3 Not Detected      Parainfluenza Virus 4 Not Detected      Bordetella pertussis pcr Not Detected      Influenza 2009 H1N1 by PCR Not Detected      Chlamydophila pneumoniae PCR Not Detected      Mycoplasma pneumo by PCR Not Detected      Influenza A PCR Not Detected      Influenza A H3 Not Detected      Influenza A H1 Not Detected      RSV, PCR Not Detected     Magnesium [[id number]]  (Normal) Collected:  01/07/17 2213    Specimen:  Blood Updated:  01/07/17 2251     Magnesium 1.7 mg/dL     Blood Culture ID, PCR [[id number]]  (Abnormal) Collected:  01/07/17 0032    Specimen:  Blood from Arm, Left Updated:  01/08/17 0321     BCID, PCR        Streptococcus spp, not A, B, or pneumoniae. Identification by BCID PCR. (A)    CBC & Differential [[id number]] Collected:  01/08/17 0327    Specimen:  Blood Updated:  01/08/17 0352    Narrative:       The following orders were created for panel order CBC & Differential.  Procedure                               Abnormality         Status                     ---------                               -----------         ------                     CBC Auto Differential[[id number]]         Abnormal            Final result                 Please view results for these tests on the individual orders.    CBC Auto Differential [[id number]]  (Abnormal) Collected:  01/08/17 0327    Specimen:  Blood Updated:  01/08/17 0352     WBC 12.18 (H) 10*3/mm3      RBC 4.72 10*6/mm3      Hemoglobin 12.3 (L) g/dL      Hematocrit 40.3 (L) %      MCV 85.4 fL      MCH 26.1 (L) pg      MCHC 30.5 (L) g/dL      RDW 14.9 (H) %      RDW-SD 46.4 fl      MPV 10.2 fL      Platelets 180 10*3/mm3      Neutrophil % 69.6 %      Lymphocyte % 22.2 %      Monocyte % 6.5 %      Eosinophil % 1.3 %      Basophil % 0.2 %      Immature Grans % 0.2 %      Neutrophils, Absolute 8.47 (H) 10*3/mm3      Lymphocytes, Absolute 2.71 10*3/mm3      Monocytes, Absolute 0.79 10*3/mm3      Eosinophils, Absolute 0.16 10*3/mm3   "    Basophils, Absolute 0.03 10*3/mm3      Immature Grans, Absolute 0.02 10*3/mm3     Protime-INR [[id number]]  (Abnormal) Collected:  01/08/17 0327    Specimen:  Blood Updated:  01/08/17 0401     Protime 22.5 (H) Seconds      INR 2.06 (H)     Hemoglobin A1c [[id number]]  (Abnormal) Collected:  01/08/17 0327    Specimen:  Blood Updated:  01/08/17 0404     Hemoglobin A1C 6.29 (H) %     Narrative:       Hemoglobin A1C Ranges:    Increased Risk for Diabetes  5.7% to 6.4%  Diabetes                     >= 6.5%  Diabetic Goal                < 7.0%    Basic Metabolic Panel [[id number]]  (Abnormal) Collected:  01/08/17 0327    Specimen:  Blood Updated:  01/08/17 0421     Glucose 96 mg/dL      BUN 7 mg/dL      Creatinine 0.71 (L) mg/dL      Sodium 139 mmol/L      Potassium 3.2 (L) mmol/L      Chloride 104 mmol/L      CO2 23.3 mmol/L      Calcium 8.6 mg/dL      eGFR  African Amer 139 mL/min/1.73      eGFR Non African Amer 115 mL/min/1.73      BUN/Creatinine Ratio 9.9      Anion Gap 11.7 mmol/L     Narrative:       GFR Normal >60  Chronic Kidney Disease <60  Kidney Failure <15    Procalcitonin [[id number]]  (Abnormal) Collected:  01/08/17 0327    Specimen:  Blood Updated:  01/08/17 0432     Procalcitonin 5.51 (C) ng/mL     Narrative:       As a Marker for Sepsis (Non-Neonates):   1. <0.5 ng/mL represents a low risk of severe sepsis and/or septic shock.  1. >2 ng/mL represents a high risk of severe sepsis and/or septic shock.    As a Marker for Lower Respiratory Tract Infections that require antibiotic therapy:  PCT on Admission     Antibiotic Therapy             6-12 Hrs later  > 0.5                Strongly Recommended            >0.25 - <0.5         Recommended  0.1 - 0.25           Discouraged                   Remeasure/reassess PCT  <0.1                 Strongly Discouraged          Remeasure/reassess PCT      As 28 day mortality risk marker: \"Change in Procalcitonin Result\" (> 80 % or <=80 %) if Day 0 (or Day 1) and Day 4 values " are available. Refer to [email or web address]/   Change in PCT <=80 %   A decrease of PCT levels below or equal to 80 % defines a positive change in PCT test result representing a higher risk for 28-day all-cause mortality of patients diagnosed with severe sepsis or septic shock.  Change in PCT > 80 %   A decrease of PCT levels of more than 80 % defines a negative change in PCT result representing a lower risk for 28-day all-cause mortality of patients diagnosed with severe sepsis or septic shock.                Troponin [[id number]]  (Normal) Collected:  01/08/17 0918    Specimen:  Blood Updated:  01/08/17 1009     Troponin T <0.010 ng/mL     Narrative:       Troponin T Reference Ranges:  Less than 0.03 ng/mL:    Negative for AMI  0.03 to 0.09 ng/mL:      Indeterminant for AMI  Greater than 0.09 ng/mL: Positive for AMI    Urine Culture [[id number]]  (Abnormal)  (Susceptibility) Collected:  01/07/17 0042    Specimen:  Urine from Urine, Catheter Updated:  01/08/17 1115     Urine Culture >100,000 CFU/mL Escherichia coli (A)     Susceptibility      Escherichia coli     JANETH     Ampicillin >=32 ug/ml Resistant     Ampicillin + Sulbactam >=32 ug/ml Resistant     Cefazolin >=64 ug/ml Resistant     Cefepime 2 ug/ml Susceptible     Ceftriaxone >=64 ug/ml Resistant     Ciprofloxacin >=4 ug/ml Resistant     Ertapenem <=0.5 ug/ml Susceptible     Gentamicin <=1 ug/ml Susceptible     Levofloxacin >=8 ug/ml Resistant     Nitrofurantoin <=16 ug/ml Susceptible     Piperacillin + Tazobactam <=4 ug/ml Susceptible     Tetracycline 2 ug/ml Susceptible     Trimethoprim + Sulfamethoxazole <=20 ug/ml Susceptible                    Blood Culture ID, PCR [[id number]]  (Abnormal) Collected:  01/07/17 0034    Specimen:  Blood from Arm, Right Updated:  01/08/17 1247     BCID, PCR        Staphylococcus spp, not aureus. Identification by BCID PCR. (A)    Troponin [[id number]]  (Normal) Collected:  01/08/17 1817    Specimen:  Blood Updated:   01/08/17 1848     Troponin T <0.010 ng/mL     Narrative:       Troponin T Reference Ranges:  Less than 0.03 ng/mL:    Negative for AMI  0.03 to 0.09 ng/mL:      Indeterminant for AMI  Greater than 0.09 ng/mL: Positive for AMI    CBC & Differential [[id number]] Collected:  01/09/17 0537    Specimen:  Blood Updated:  01/09/17 0605    Narrative:       The following orders were created for panel order CBC & Differential.  Procedure                               Abnormality         Status                     ---------                               -----------         ------                     CBC Auto Differential[[id number]]         Abnormal            Final result                 Please view results for these tests on the individual orders.    CBC Auto Differential [[id number]]  (Abnormal) Collected:  01/09/17 0537    Specimen:  Blood Updated:  01/09/17 0605     WBC 9.06 10*3/mm3      RBC 4.79 10*6/mm3      Hemoglobin 12.4 (L) g/dL      Hematocrit 40.2 (L) %      MCV 83.9 fL      MCH 25.9 (L) pg      MCHC 30.8 (L) g/dL      RDW 14.7 (H) %      RDW-SD 45.2 fl      MPV 10.1 fL      Platelets 189 10*3/mm3      Neutrophil % 62.5 %      Lymphocyte % 26.8 %      Monocyte % 7.9 %      Eosinophil % 2.3 %      Basophil % 0.3 %      Immature Grans % 0.2 %      Neutrophils, Absolute 5.65 10*3/mm3      Lymphocytes, Absolute 2.43 10*3/mm3      Monocytes, Absolute 0.72 10*3/mm3      Eosinophils, Absolute 0.21 10*3/mm3      Basophils, Absolute 0.03 10*3/mm3      Immature Grans, Absolute 0.02 10*3/mm3     Protime-INR [[id number]]  (Abnormal) Collected:  01/09/17 0537    Specimen:  Blood Updated:  01/09/17 0614     Protime 21.1 (H) Seconds      INR 1.90 (H)     BNP [[id number]]  (Abnormal) Collected:  01/09/17 0537    Specimen:  Blood Updated:  01/09/17 0628     proBNP 947.4 (H) pg/mL     Narrative:       Among patients with dyspnea, NT-proBNP is highly sensitive for the detection of acute congestive heart failure. In addition NT-proBNP of  <300 pg/ml effectively rules out acute congestive heart failure with 99% negative predictive value.    Basic Metabolic Panel [[id number]]  (Abnormal) Collected:  01/09/17 0537    Specimen:  Blood Updated:  01/09/17 0630     Glucose 73 mg/dL      BUN 6 mg/dL      Creatinine 0.66 (L) mg/dL      Sodium 139 mmol/L      Potassium 3.4 (L) mmol/L      Chloride 101 mmol/L      CO2 20.4 (L) mmol/L      Calcium 8.7 mg/dL      eGFR  African Amer >150 mL/min/1.73      eGFR Non African Amer 125 mL/min/1.73      BUN/Creatinine Ratio 9.1      Anion Gap 17.6 mmol/L     Narrative:       GFR Normal >60  Chronic Kidney Disease <60  Kidney Failure <15    Magnesium [[id number]]  (Normal) Collected:  01/09/17 0537    Specimen:  Blood Updated:  01/09/17 0630     Magnesium 1.7 mg/dL     Blood Culture [[id number]]  (Abnormal) Collected:  01/07/17 0034    Specimen:  Blood from Arm, Right Updated:  01/09/17 0758     Blood Culture Staphylococcus, coagulase negative (A)       Probable contaminant requires clinical correlation, susceptibility not performed unless requested by physician.          Gram Stain Result        Aerobic Bottle Gram positive cocci in clusters    POC Glucose Fingerstick [[id number]]  (Normal) Collected:  01/09/17 2040    Specimen:  Blood Updated:  01/09/17 2048     Glucose 126 mg/dL     Narrative:       Meter: [id number] : [id number] Samuel SIMMONS    CBC (No Diff) [[id number]]  (Abnormal) Collected:  01/10/17 0359    Specimen:  Blood Updated:  01/10/17 0503     WBC 6.91 10*3/mm3      RBC 4.86 10*6/mm3      Hemoglobin 12.6 (L) g/dL      Hematocrit 39.8 (L) %      MCV 81.9 fL      MCH 25.9 (L) pg      MCHC 31.7 (L) g/dL      RDW 14.6 (H) %      RDW-SD 43.6 fl      MPV 10.2 fL      Platelets 221 10*3/mm3     Protime-INR [[id number]]  (Abnormal) Collected:  01/10/17 0359    Specimen:  Blood Updated:  01/10/17 0510     Protime 26.6 (H) Seconds      INR 2.55 (H)     Creatinine, Serum [[id number]]  (Abnormal) Collected:  01/10/17 0355     Specimen:  Blood Updated:  01/10/17 0527     Creatinine 0.66 (L) mg/dL      eGFR Non African Amer 125 mL/min/1.73      eGFR  African Amer >150 mL/min/1.73     Blood Culture [[id number]]  (Abnormal)  (Susceptibility) Collected:  01/07/17 0032    Specimen:  Blood from Arm, Left Updated:  01/10/17 0734     Blood Culture Streptococcus salivarius ssp salivarius (A)      Gram Stain Result        Anaerobic Bottle Gram positive cocci in chains    Susceptibility      Streptococcus salivarius ssp salivarius     JANETH     Ceftriaxone 0.25 ug/ml Susceptible     Clindamycin >=1 ug/ml Resistant     Erythromycin >=8 ug/ml Resistant     Levofloxacin 4 ug/ml Intermediate     Penicillin G 0.25 ug/ml Intermediate     Vancomycin 0.5 ug/ml Susceptible                    BNP [[id number]]  (Normal) Collected:  01/10/17 1009    Specimen:  Blood Updated:  01/10/17 1053     proBNP 698.6 pg/mL     Narrative:       Among patients with dyspnea, NT-proBNP is highly sensitive for the detection of acute congestive heart failure. In addition NT-proBNP of <300 pg/ml effectively rules out acute congestive heart failure with 99% negative predictive value.    Basic Metabolic Panel [[id number]]  (Abnormal) Collected:  01/10/17 1009    Specimen:  Blood Updated:  01/10/17 1125     Glucose 176 (H) mg/dL      BUN 5 (L) mg/dL      Creatinine 0.72 (L) mg/dL      Sodium 135 (L) mmol/L      Potassium 3.3 (L) mmol/L      Chloride 99 mmol/L      CO2 19.3 (L) mmol/L      Calcium 8.5 (L) mg/dL      eGFR  African Amer 137 mL/min/1.73      eGFR Non African Amer 113 mL/min/1.73      BUN/Creatinine Ratio 6.9 (L)      Anion Gap 16.7 mmol/L     Narrative:       GFR Normal >60  Chronic Kidney Disease <60  Kidney Failure <15    Levetiracetam Level (Keppra) [[id number]] Collected:  01/07/17 0033    Specimen:  Blood Updated:  01/10/17 1311     Levetiracetam 10.6 ug/mL     Narrative:       Performed at:  [address] [place] [address]  [id number]  :  Bogdan Gonzalez MD, Phone:  [phone]    Phenytoin Level, Total & Free [[id number]] Collected:  17 0327    Specimen:  Blood Updated:  01/10/17 1730     Phenytoin Level 10.5 ug/mL                                       :                                  Therapeutic 6.0 - 14.0                                  Detection Limit =  0.8                            <0.8 Indicates None Detected        Phenytoin, Free 1.6 ug/mL                                       Detection Limit = 0.5       Narrative:       Performed at:  [address] [place] [address]  [id number]  : Bogdan Gonzalez MD, Phone:  [phone]    POC Glucose Fingerstick [[id number]]  (Abnormal) Collected:  01/10/17 2052    Specimen:  Blood Updated:  01/10/17 2054     Glucose 156 (H) mg/dL     Narrative:       Meter: [id number] : [id number] Raciel Leblanc    Potassium [[id number]]  (Normal) Collected:  01/10/17 2115    Specimen:  Blood Updated:  01/10/17 2144     Potassium 4.0 mmol/L     CBC (No Diff) [[id number]]  (Abnormal) Collected:  17    Specimen:  Blood Updated:  1715     WBC 6.40 10*3/mm3      RBC 4.83 10*6/mm3      Hemoglobin 12.4 (L) g/dL      Hematocrit 39.5 (L) %      MCV 81.8 fL      MCH 25.7 (L) pg      MCHC 31.4 (L) g/dL      RDW 14.8 (H) %      RDW-SD 43.8 fl      MPV 9.7 fL      Platelets 237 10*3/mm3     Creatinine, Serum [[id number]]  (Abnormal) Collected:  17    Specimen:  Blood Updated:  17 05     Creatinine 0.67 (L) mg/dL      eGFR Non African Amer 123 mL/min/1.73      eGFR  African Amer 149 mL/min/1.73     Protime-INR [[id number]]  (Abnormal) Collected:  17    Specimen:  Blood Updated:  17 0522     Protime 29.1 (H) Seconds      INR 2.87 (H)     POC Glucose Fingerstick [[id number]]  (Abnormal) Collected:  17 1146    Specimen:  Blood Updated:  17 1147     Glucose 168 (H) mg/dL     Narrative:       Meter: [id number] : [id number]  Kerrie Vivi    POC Glucose Fingerstick [[id number]]  (Abnormal) Collected:  01/11/17 1650    Specimen:  Blood Updated:  01/11/17 1651     Glucose 134 (H) mg/dL     Narrative:       Meter: [id number] : [id number] Kerrie Vivi    POC Glucose Fingerstick [[id number]]  (Normal) Collected:  01/11/17 2136    Specimen:  Blood Updated:  01/11/17 2137     Glucose 119 mg/dL     Narrative:       Meter: [id number] : [id number] Fowler Janneth    CBC & Differential [[id number]] Collected:  01/12/17 0544    Specimen:  Blood Updated:  01/12/17 0701    Narrative:       The following orders were created for panel order CBC & Differential.  Procedure                               Abnormality         Status                     ---------                               -----------         ------                     CBC Auto Differential[[id number]]         Abnormal            Final result                 Please view results for these tests on the individual orders.    CBC Auto Differential [[id number]]  (Abnormal) Collected:  01/12/17 0544    Specimen:  Blood Updated:  01/12/17 0701     WBC 6.11 10*3/mm3      RBC 5.08 10*6/mm3      Hemoglobin 13.1 (L) g/dL      Hematocrit 41.4 %      MCV 81.5 fL      MCH 25.8 (L) pg      MCHC 31.6 (L) g/dL      RDW 14.9 (H) %      RDW-SD 43.8 fl      MPV 9.8 fL      Platelets 229 10*3/mm3      Neutrophil % 46.6 %      Lymphocyte % 38.0 %      Monocyte % 10.5 %      Eosinophil % 4.3 %      Basophil % 0.3 %      Immature Grans % 0.3 %      Neutrophils, Absolute 2.85 10*3/mm3      Lymphocytes, Absolute 2.32 10*3/mm3      Monocytes, Absolute 0.64 10*3/mm3      Eosinophils, Absolute 0.26 10*3/mm3      Basophils, Absolute 0.02 10*3/mm3      Immature Grans, Absolute 0.02 10*3/mm3     Protime-INR [[id number]]  (Abnormal) Collected:  01/12/17 0544    Specimen:  Blood Updated:  01/12/17 0706     Protime 24.7 (H) Seconds      INR 2.33 (H)     Basic Metabolic Panel [[id number]]  (Abnormal) Collected:  01/12/17 0544     Specimen:  Blood Updated:  01/12/17 0721     Glucose 116 (H) mg/dL      BUN 3 (L) mg/dL      Creatinine 0.52 (L) mg/dL      Sodium 142 mmol/L      Potassium 3.4 (L) mmol/L      Chloride 106 mmol/L      CO2 22.9 mmol/L      Calcium 8.8 mg/dL      eGFR  African Amer >150 mL/min/1.73      eGFR Non African Amer >150 mL/min/1.73      BUN/Creatinine Ratio 5.8 (L)      Anion Gap 13.1 mmol/L     Narrative:       GFR Normal >60  Chronic Kidney Disease <60  Kidney Failure <15    POC Glucose Fingerstick [[id number]]  (Normal) Collected:  01/12/17 0733    Specimen:  Blood Updated:  01/12/17 0735     Glucose 112 mg/dL     Narrative:       Meter: [id number] : [id number] Kerrie Trinidad    POC Glucose Fingerstick [[id number]]  (Abnormal) Collected:  01/12/17 1134    Specimen:  Blood Updated:  01/12/17 1136     Glucose 155 (H) mg/dL     Narrative:       Meter: [id number] : [id number] Get Luis    POC Glucose Fingerstick [[id number]]  (Normal) Collected:  01/12/17 1632    Specimen:  Blood Updated:  01/12/17 1634     Glucose 125 mg/dL     Narrative:       Meter: [id number] : [id number] Kerrie Trinidad    Urinalysis With / Microscopic If Indicated [[id number]]  (Abnormal) Collected:  01/12/17 1821    Specimen:  Urine from Urine, Catheter Updated:  01/12/17 1837     Color, UA Yellow      Appearance, UA Clear      pH, UA 6.0      Specific Gravity, UA 1.014      Glucose, UA Negative      Ketones, UA Negative      Bilirubin, UA Negative      Blood, UA Negative      Protein, UA 30 mg/dL (1+) (A)      Leuk Esterase, UA Trace (A)      Nitrite, UA Negative      Urobilinogen, UA 0.2 E.U./dL     Urinalysis, Microscopic Only [[id number]]  (Abnormal) Collected:  01/12/17 1821    Specimen:  Urine from Urine, Catheter Updated:  01/12/17 1837     RBC, UA 0-2 (A) /HPF      WBC, UA 13-20 (A) /HPF      Bacteria, UA None Seen /HPF      Squamous Epithelial Cells, UA 0-2 /HPF      Hyaline Casts, UA 7-12 /LPF      Methodology Automated Microscopy      POC Glucose Fingerstick [[id number]]  (Abnormal) Collected:  01/12/17 2054    Specimen:  Blood Updated:  01/12/17 2056     Glucose 139 (H) mg/dL     Narrative:       Meter: [id number] : [id number] Malcolm Lagunas    CBC & Differential [[id number]] Collected:  01/13/17 0626    Specimen:  Blood Updated:  01/13/17 0641    Narrative:       The following orders were created for panel order CBC & Differential.  Procedure                               Abnormality         Status                     ---------                               -----------         ------                     CBC Auto Differential[[id number]]         Abnormal            Final result                 Please view results for these tests on the individual orders.    CBC Auto Differential [[id number]]  (Abnormal) Collected:  01/13/17 0626    Specimen:  Blood Updated:  01/13/17 0641     WBC 7.11 10*3/mm3      RBC 4.92 10*6/mm3      Hemoglobin 12.9 (L) g/dL      Hematocrit 40.7 %      MCV 82.7 fL      MCH 26.2 (L) pg      MCHC 31.7 (L) g/dL      RDW 15.1 (H) %      RDW-SD 45.4 fl      MPV 9.6 fL      Platelets 220 10*3/mm3      Neutrophil % 43.1 %      Lymphocyte % 42.8 %      Monocyte % 10.0 %      Eosinophil % 3.4 %      Basophil % 0.3 %      Immature Grans % 0.4 %      Neutrophils, Absolute 3.07 10*3/mm3      Lymphocytes, Absolute 3.04 10*3/mm3      Monocytes, Absolute 0.71 10*3/mm3      Eosinophils, Absolute 0.24 10*3/mm3      Basophils, Absolute 0.02 10*3/mm3      Immature Grans, Absolute 0.03 10*3/mm3     Protime-INR [[id number]]  (Abnormal) Collected:  01/13/17 0626    Specimen:  Blood Updated:  01/13/17 0705     Protime 26.2 (H) Seconds      INR 2.50 (H)     Basic Metabolic Panel [[id number]]  (Abnormal) Collected:  01/13/17 0626    Specimen:  Blood Updated:  01/13/17 0705     Glucose 102 (H) mg/dL      BUN 4 (L) mg/dL      Creatinine 0.61 (L) mg/dL      Sodium 141 mmol/L      Potassium 3.4 (L) mmol/L      Chloride 105 mmol/L      CO2 23.4 mmol/L       "Calcium 8.7 mg/dL      eGFR  African Amer >150 mL/min/1.73      eGFR Non African Amer 137 mL/min/1.73      BUN/Creatinine Ratio 6.6 (L)      Anion Gap 12.6 mmol/L     Narrative:       GFR Normal >60  Chronic Kidney Disease <60  Kidney Failure <15    Magnesium [[id number]]  (Normal) Collected:  01/13/17 0626    Specimen:  Blood Updated:  01/13/17 0705     Magnesium 1.6 mg/dL     POC Glucose Fingerstick [[id number]]  (Normal) Collected:  01/13/17 0741    Specimen:  Blood Updated:  01/13/17 0744     Glucose 94 mg/dL     Narrative:       Meter: [id number] : [id number] Kerrieeloisa Trinidad    Blood Culture [[id number]]  (Normal) Collected:  01/08/17 0918    Specimen:  Blood from Arm, Right Updated:  01/13/17 1001     Blood Culture No growth at 5 days     Blood Culture [[id number]]  (Normal) Collected:  01/08/17 0956    Specimen:  Blood from Hand, Left Updated:  01/13/17 1001     Blood Culture No growth at 5 days               Condition on Discharge:  good    Physical Exam at Discharge  General Appearance:  awake, alert, oriented, in no acute distress  Heart:  Heart sounds are normal.  Regular rate and rhythm without murmur, gallop or rub.  Abdomen:  Soft, non-tender, normal bowel sounds; no bruits, organomegaly or masses.    Vital Signs  Temp:  [97.2 °F (36.2 °C)-97.9 °F (36.6 °C)] 97.4 °F (36.3 °C)  Heart Rate:  [64-77] 70  Resp:  [20] 20  BP: (123-150)/() 150/108    Flowsheet Rows         First Filed Value    Admission Height  64.5\" (163.8 cm) Documented at 01/06/2017 2354    Admission Weight  173 lb 1 oz (78.5 kg) Documented at 01/06/2017 2354              Discharge Disposition  LTC    Discharge Medications   Servando Kapadia   Home Medication Instructions ARTEM:[id number]    Printed on:01/13/17 1033   Medication Information                      acetaminophen (TYLENOL) 325 MG tablet  650 mg by Enteral route every 6 (six) hours as needed (for pain/elevated temperature). GIVEN PER FEEDING TUBE             amLODIPine " (NORVASC) 5 MG tablet  Take 1 tablet by mouth Daily.             bisacodyl (DULCOLAX) 10 MG suppository  Insert 10 mg into the rectum daily as needed for constipation.             cefepime (MAXIPIME) 2 g/100 mL 0.9% NS (mbp)  Infuse 100 mL into a venous catheter Every 12 (Twelve) Hours for 9 days.             dextrose (GLUTOSE) 40 % gel  Take 15 g by mouth as needed for low blood sugar (may give for BS<60 & symptomatic).             escitalopram (LEXAPRO) 10 MG tablet  10 mg by Enteral route daily. GIVEN PER FEEDING TUBE             ferrous sulfate 300 (60 FE) MG/5ML syrup  325 mg by Enteral route daily. PER FEEDING TUBE             glucagon (GLUCAGEN) 1 MG injection  Inject 1 mg into the shoulder, thigh, or buttocks 1 (one) time as needed for low blood sugar (give for BS<60 & symptomatic, may repeat x2 with 10mins apart).             levETIRAcetam (KEPPRA) 500 MG tablet  Take 1 tablet by mouth Every 12 (Twelve) Hours.             magnesium hydroxide (MILK OF MAGNESIA) 400 MG/5ML suspension  30 mL by Enteral route daily as needed for constipation. PER FEEDING TUBE             metFORMIN (GLUCOPHAGE) 1000 MG tablet  1,000 mg by Enteral route 2 (two) times a day with meals. given at 0730 & 1630 PER FEEDING TUBE             metoprolol tartrate (LOPRESSOR) 25 MG tablet  Take 1 tablet by mouth Every 12 (Twelve) Hours.             nitroglycerin (NITROSTAT) 0.4 MG SL tablet  Place 1 tablet under the tongue Every 5 (Five) Minutes As Needed for chest pain (if systolic BP greater than 100 mm/Hg.).             phenytoin (DILANTIN) 125 MG/5ML suspension  Take 300 mg by mouth Every Night.             saccharomyces boulardii (FLORASTOR) 250 MG capsule  Take 1 capsule by mouth 2 (Two) Times a Day.             sennosides-docusate sodium (SENOKOT-S) 8.6-50 MG tablet  2 tablets by Enteral route every night. GIVEN PER FEEDING TUBE             simvastatin (ZOCOR) 10 MG tablet  10 mg by Enteral route every night. GIVEN PER FEEDING TUBE              warfarin (COUMADIN) 3 MG tablet  4 mg by Enteral route daily. Give 1 tablet by mouth at bedtime related to unspecified atrial fibrillation PER FEEDING TUBE  HOLDING FOR SURGERY                 Discharge Diet:   Diet Instructions     Diet: Soft Texture, Consistent Carbohydrate; Thin Liquids, No Restrictions; Ground       Discharge Diet:   Soft Texture  Consistent Carbohydrate      Fluid Consistency:  Thin Liquids, No Restrictions   Soft Options:  Ground                 Activity at Discharge:     Follow-up Appointments  No future appointments.  Referrals and Follow-ups to Schedule     Ambulatory Referral to Physical Therapy Evaluate and treat    As directed    Specialty modality needed?:  Evaluate and treat                 Test Results Pending at Discharge  NEEDS TO SEE dENTIST   > 30MINS ON DISCHARGE  Dylan Cuba MD  01/13/17  10:33 AM

## 2017-01-13 NOTE — PROGRESS NOTES
Faxed DC Summary to facility. Called to Yael/Sylvester as we have not heard ok status for IV ATB. Await return call.

## 2017-02-20 ENCOUNTER — OUTSIDE FACILITY SERVICE (OUTPATIENT)
Dept: FAMILY MEDICINE CLINIC | Facility: CLINIC | Age: 57
End: 2017-02-20

## 2017-02-20 PROCEDURE — 99211 OFF/OP EST MAY X REQ PHY/QHP: CPT | Performed by: NURSE PRACTITIONER

## 2017-03-06 ENCOUNTER — OUTSIDE FACILITY SERVICE (OUTPATIENT)
Dept: FAMILY MEDICINE CLINIC | Facility: CLINIC | Age: 57
End: 2017-03-06

## 2017-03-06 PROCEDURE — 99211 OFF/OP EST MAY X REQ PHY/QHP: CPT | Performed by: NURSE PRACTITIONER

## 2017-04-06 ENCOUNTER — OUTSIDE FACILITY SERVICE (OUTPATIENT)
Dept: FAMILY MEDICINE CLINIC | Facility: CLINIC | Age: 57
End: 2017-04-06

## 2017-04-06 PROCEDURE — 99308 SBSQ NF CARE LOW MDM 20: CPT | Performed by: NURSE PRACTITIONER

## 2017-05-30 RX ORDER — HYDROCODONE BITARTRATE AND ACETAMINOPHEN 5; 325 MG/1; MG/1
1 TABLET ORAL EVERY 6 HOURS PRN
Qty: 120 TABLET | Refills: 0 | Status: SHIPPED | OUTPATIENT
Start: 2017-05-30 | End: 2017-07-25 | Stop reason: SDUPTHER

## 2017-06-22 ENCOUNTER — OUTSIDE FACILITY SERVICE (OUTPATIENT)
Dept: FAMILY MEDICINE CLINIC | Facility: CLINIC | Age: 57
End: 2017-06-22

## 2017-06-22 PROCEDURE — 99307 SBSQ NF CARE SF MDM 10: CPT | Performed by: FAMILY MEDICINE

## 2017-07-25 RX ORDER — HYDROCODONE BITARTRATE AND ACETAMINOPHEN 5; 325 MG/1; MG/1
1 TABLET ORAL EVERY 6 HOURS PRN
Qty: 120 TABLET | Refills: 0 | Status: SHIPPED | OUTPATIENT
Start: 2017-07-25 | End: 2017-09-18 | Stop reason: SDUPTHER

## 2017-07-27 ENCOUNTER — OUTSIDE FACILITY SERVICE (OUTPATIENT)
Dept: FAMILY MEDICINE CLINIC | Facility: CLINIC | Age: 57
End: 2017-07-27

## 2017-07-27 PROCEDURE — 99308 SBSQ NF CARE LOW MDM 20: CPT | Performed by: NURSE PRACTITIONER

## 2017-08-03 ENCOUNTER — OUTSIDE FACILITY SERVICE (OUTPATIENT)
Dept: FAMILY MEDICINE CLINIC | Facility: CLINIC | Age: 57
End: 2017-08-03

## 2017-08-03 PROCEDURE — 99309 SBSQ NF CARE MODERATE MDM 30: CPT | Performed by: NURSE PRACTITIONER

## 2017-08-08 ENCOUNTER — OUTSIDE FACILITY SERVICE (OUTPATIENT)
Dept: FAMILY MEDICINE CLINIC | Facility: CLINIC | Age: 57
End: 2017-08-08

## 2017-08-08 PROCEDURE — 99308 SBSQ NF CARE LOW MDM 20: CPT | Performed by: NURSE PRACTITIONER

## 2017-09-18 RX ORDER — HYDROCODONE BITARTRATE AND ACETAMINOPHEN 5; 325 MG/1; MG/1
1 TABLET ORAL EVERY 6 HOURS PRN
Qty: 120 TABLET | Refills: 0 | Status: SHIPPED | OUTPATIENT
Start: 2017-09-18 | End: 2017-11-20 | Stop reason: SDUPTHER

## 2017-09-28 ENCOUNTER — OUTSIDE FACILITY SERVICE (OUTPATIENT)
Dept: FAMILY MEDICINE CLINIC | Facility: CLINIC | Age: 57
End: 2017-09-28

## 2017-09-28 PROCEDURE — G0439 PPPS, SUBSEQ VISIT: HCPCS | Performed by: NURSE PRACTITIONER

## 2017-10-05 ENCOUNTER — OUTSIDE FACILITY SERVICE (OUTPATIENT)
Dept: FAMILY MEDICINE CLINIC | Facility: CLINIC | Age: 57
End: 2017-10-05

## 2017-10-05 PROCEDURE — 99308 SBSQ NF CARE LOW MDM 20: CPT | Performed by: NURSE PRACTITIONER

## 2017-11-16 ENCOUNTER — OUTSIDE FACILITY SERVICE (OUTPATIENT)
Dept: FAMILY MEDICINE CLINIC | Facility: CLINIC | Age: 57
End: 2017-11-16

## 2017-11-16 PROCEDURE — 99308 SBSQ NF CARE LOW MDM 20: CPT | Performed by: NURSE PRACTITIONER

## 2017-11-20 RX ORDER — HYDROCODONE BITARTRATE AND ACETAMINOPHEN 5; 325 MG/1; MG/1
1 TABLET ORAL EVERY 6 HOURS PRN
Qty: 120 TABLET | Refills: 0 | Status: SHIPPED | OUTPATIENT
Start: 2017-11-20 | End: 2017-11-27 | Stop reason: SDUPTHER

## 2017-11-27 RX ORDER — HYDROCODONE BITARTRATE AND ACETAMINOPHEN 5; 325 MG/1; MG/1
1 TABLET ORAL EVERY 6 HOURS PRN
Qty: 240 TABLET | Refills: 0 | Status: SHIPPED | OUTPATIENT
Start: 2017-11-27 | End: 2018-01-23 | Stop reason: SDUPTHER

## 2018-01-17 ENCOUNTER — OUTSIDE FACILITY SERVICE (OUTPATIENT)
Dept: INTERNAL MEDICINE | Facility: CLINIC | Age: 58
End: 2018-01-17

## 2018-01-17 PROCEDURE — 99307 SBSQ NF CARE SF MDM 10: CPT | Performed by: FAMILY MEDICINE

## 2018-01-23 RX ORDER — HYDROCODONE BITARTRATE AND ACETAMINOPHEN 5; 325 MG/1; MG/1
1 TABLET ORAL EVERY 6 HOURS PRN
Qty: 240 TABLET | Refills: 0 | Status: SHIPPED | OUTPATIENT
Start: 2018-01-23 | End: 2018-03-19 | Stop reason: SDUPTHER

## 2018-02-21 ENCOUNTER — OUTSIDE FACILITY SERVICE (OUTPATIENT)
Dept: INTERNAL MEDICINE | Facility: CLINIC | Age: 58
End: 2018-02-21

## 2018-02-21 PROCEDURE — 99308 SBSQ NF CARE LOW MDM 20: CPT | Performed by: NURSE PRACTITIONER

## 2018-03-14 ENCOUNTER — OUTSIDE FACILITY SERVICE (OUTPATIENT)
Dept: INTERNAL MEDICINE | Facility: CLINIC | Age: 58
End: 2018-03-14

## 2018-03-14 PROCEDURE — 99307 SBSQ NF CARE SF MDM 10: CPT | Performed by: NURSE PRACTITIONER

## 2018-03-20 RX ORDER — HYDROCODONE BITARTRATE AND ACETAMINOPHEN 5; 325 MG/1; MG/1
1 TABLET ORAL EVERY 6 HOURS PRN
Qty: 240 TABLET | Refills: 0 | Status: SHIPPED | OUTPATIENT
Start: 2018-03-20 | End: 2018-05-14 | Stop reason: SDUPTHER

## 2018-03-23 ENCOUNTER — OUTSIDE FACILITY SERVICE (OUTPATIENT)
Dept: INTERNAL MEDICINE | Facility: CLINIC | Age: 58
End: 2018-03-23

## 2018-03-23 PROCEDURE — 99308 SBSQ NF CARE LOW MDM 20: CPT | Performed by: NURSE PRACTITIONER

## 2018-04-25 ENCOUNTER — OUTSIDE FACILITY SERVICE (OUTPATIENT)
Dept: INTERNAL MEDICINE | Facility: CLINIC | Age: 58
End: 2018-04-25

## 2018-04-25 PROCEDURE — 99307 SBSQ NF CARE SF MDM 10: CPT | Performed by: FAMILY MEDICINE

## 2018-05-15 RX ORDER — HYDROCODONE BITARTRATE AND ACETAMINOPHEN 5; 325 MG/1; MG/1
1 TABLET ORAL EVERY 6 HOURS PRN
Qty: 240 TABLET | Refills: 0 | Status: SHIPPED | OUTPATIENT
Start: 2018-05-15 | End: 2018-07-09 | Stop reason: SDUPTHER

## 2018-06-01 ENCOUNTER — OUTSIDE FACILITY SERVICE (OUTPATIENT)
Dept: INTERNAL MEDICINE | Facility: CLINIC | Age: 58
End: 2018-06-01

## 2018-06-01 PROCEDURE — 99308 SBSQ NF CARE LOW MDM 20: CPT | Performed by: NURSE PRACTITIONER

## 2018-07-10 RX ORDER — HYDROCODONE BITARTRATE AND ACETAMINOPHEN 5; 325 MG/1; MG/1
1 TABLET ORAL EVERY 6 HOURS PRN
Qty: 240 TABLET | Refills: 0 | Status: SHIPPED | OUTPATIENT
Start: 2018-07-10 | End: 2018-09-04 | Stop reason: SDUPTHER

## 2018-08-08 ENCOUNTER — OUTSIDE FACILITY SERVICE (OUTPATIENT)
Dept: INTERNAL MEDICINE | Facility: CLINIC | Age: 58
End: 2018-08-08

## 2018-08-08 PROCEDURE — 99307 SBSQ NF CARE SF MDM 10: CPT | Performed by: FAMILY MEDICINE

## 2018-09-04 RX ORDER — HYDROCODONE BITARTRATE AND ACETAMINOPHEN 5; 325 MG/1; MG/1
1 TABLET ORAL EVERY 6 HOURS PRN
Qty: 240 TABLET | Refills: 0 | Status: SHIPPED | OUTPATIENT
Start: 2018-09-04 | End: 2018-10-29 | Stop reason: SDUPTHER

## 2018-10-30 RX ORDER — HYDROCODONE BITARTRATE AND ACETAMINOPHEN 5; 325 MG/1; MG/1
1 TABLET ORAL EVERY 6 HOURS PRN
Qty: 240 TABLET | Refills: 0 | Status: ON HOLD | OUTPATIENT
Start: 2018-10-30 | End: 2021-09-21

## 2018-12-03 ENCOUNTER — OUTSIDE FACILITY SERVICE (OUTPATIENT)
Dept: INTERNAL MEDICINE | Facility: CLINIC | Age: 58
End: 2018-12-03

## 2018-12-03 PROCEDURE — 99307 SBSQ NF CARE SF MDM 10: CPT | Performed by: NURSE PRACTITIONER

## 2018-12-19 ENCOUNTER — OUTSIDE FACILITY SERVICE (OUTPATIENT)
Dept: INTERNAL MEDICINE | Facility: CLINIC | Age: 58
End: 2018-12-19

## 2018-12-19 PROCEDURE — 99307 SBSQ NF CARE SF MDM 10: CPT | Performed by: FAMILY MEDICINE

## 2019-02-05 ENCOUNTER — OUTSIDE FACILITY SERVICE (OUTPATIENT)
Dept: INTERNAL MEDICINE | Facility: CLINIC | Age: 59
End: 2019-02-05

## 2019-02-05 PROCEDURE — G0439 PPPS, SUBSEQ VISIT: HCPCS | Performed by: NURSE PRACTITIONER

## 2019-02-05 PROCEDURE — 99318 PR E/M ANNUAL NURSING FACILITY ASSESS STABLE 30 MIN: CPT | Performed by: NURSE PRACTITIONER

## 2019-03-05 NOTE — CONSULTS
[facility] Consult  Patient Identification:  Name: Servando Kapadia  Age: 56 y.o.  Sex: male  : 1960  MRN: [id number]   Chief Complaint: seizure like activity  History of Present Illness:   Admitted with seizure like activity  Temp 102  Wbc 12    Hx/o stones       occas freq, urgency, incontinence  Multiple medical problems s/p cva          [facility]:  [facility]  Past Medical History:  Past Medical History   Diagnosis Date   • Anemia    • Atrial fibrillation    • Atrial fibrillation    • Benign prostatic hyperplasia with lower urinary tract symptoms    • Cerebral infarct    • Chronic obstructive pyelonephritis    • Constipation    • Depression    • Diabetes mellitus      type 2   • Enlarged prostate    • Hemiplegia and hemiparesis    • Hyperlipidemia    • Hypertension    • Kidney stone    • Seizures    • Sleep apnea    • Stroke      apparent right side residual weakness   • Urinary incontinence      Past Surgical History:  Past Surgical History   Procedure Laterality Date   • Cystoscopy w/ ureteral stent placement N/A 5/3/2016     Procedure: CYSTOSCOPY LASER LITHOTRIPSY LEFT RETROGRADE URETERAL CATHETER AND STENT PLACEMENT;  Surgeon: Eduin Brennan MD;  Location: [facility];  Service:    • Endoscopy w/ peg tube placement N/A 2016     Procedure: ESOPHAGOGASTRODUODENOSCOPY WITH PERCUTANEOUS ENDOSCOPIC GASTROSTOMY TUBE INSERTION;  Surgeon: Lion Weber MD;  Location: [facility] ENDOSCOPY;  Service:    • Ureteroscopy laser lithotripsy with stent insertion N/A 2016     Procedure: URETEROSCOPY LASER LITHOTRIPSY STONE EXTRACTION WITH JJ STENT INSERTION;  Surgeon: Eduin Brennan MD;  Location: [facility];  Service:       Home Meds:  Prescriptions Prior to Admission   Medication Sig Dispense Refill Last Dose   • acetaminophen (TYLENOL) 325 MG tablet 650 mg by Enteral route every 6 (six) hours as needed (for pain/elevated temperature). GIVEN PER FEEDING TUBE   not needed   • bisacodyl  (DULCOLAX) 10 MG suppository Insert 10 mg into the rectum daily as needed for constipation.   not needed   • dextrose (GLUTOSE) 40 % gel Take 15 g by mouth as needed for low blood sugar (may give for BS<60 & symptomatic).   not needed   • escitalopram (LEXAPRO) 10 MG tablet 10 mg by Enteral route daily. GIVEN PER FEEDING TUBE   6/7/2016 at 2100   • ferrous sulfate 300 (60 FE) MG/5ML syrup 325 mg by Enteral route daily. PER FEEDING TUBE   6/7/2016 at 2100   • glucagon (GLUCAGEN) 1 MG injection Inject 1 mg into the shoulder, thigh, or buttocks 1 (one) time as needed for low blood sugar (give for BS<60 & symptomatic, may repeat x2 with 10mins apart).   not needed   • levETIRAcetam (KEPPRA) 500 MG tablet 500 mg by Enteral route 2 (two) times a day. GIVEN AT 0900 & 2100 PER FEEDING TUBE   6/7/2016 at 2100   • magnesium hydroxide (MILK OF MAGNESIA) 400 MG/5ML suspension 30 mL by Enteral route daily as needed for constipation. PER FEEDING TUBE   not needed   • metFORMIN (GLUCOPHAGE) 1000 MG tablet 1,000 mg by Enteral route 2 (two) times a day with meals. given at 0730 & 1630 PER FEEDING TUBE   6/7/2016 at 1630   • phenytoin (DILANTIN) 125 MG/5ML suspension Take 300 mg by mouth Every Night.      • sennosides-docusate sodium (SENOKOT-S) 8.6-50 MG tablet 2 tablets by Enteral route every night. GIVEN PER FEEDING TUBE   6/7/2016 at 2100   • simvastatin (ZOCOR) 10 MG tablet 10 mg by Enteral route every night. GIVEN PER FEEDING TUBE   6/7/2016 at 2100   • warfarin (COUMADIN) 4 MG tablet Take 4 mg by mouth Daily.      • phenytoin (DILANTIN) 100 MG ER capsule 200 mg by Enteral route every night. GIVEN PER FEEDING TUBE   6/7/2016 at 2100   • warfarin (COUMADIN) 3 MG tablet 4 mg by Enteral route daily. Give 1 tablet by mouth at bedtime related to unspecified atrial fibrillation PER FEEDING TUBE  HOLDING FOR SURGERY   6/3/2016     Current Meds:   [unfilled]  Allergies:  No Known Allergies  Immunizations:    There is no immunization  "history on file for this patient.  Social History:   Social History   Substance Use Topics   • Smoking status: Never Smoker   • Smokeless tobacco: Never Used      Comment: pt unable to answer questions   • Alcohol use No      Family History:  History reviewed. No pertinent family history.   Review of Systems  negative 12 point system review except:  Objective:  tMax 24 hrs: Temp (24hrs), Av.6 °F (37 °C), Min:98.4 °F (36.9 °C), Max:98.9 °F (37.2 °C)    Vitals Ranges:   Temp:  [98.4 °F (36.9 °C)-98.9 °F (37.2 °C)] 98.6 °F (37 °C)  Heart Rate:  [83-99] 83  Resp:  [16-20] 16  BP: (112-141)/(77-99) 137/96  Intake and Output Last 3 Shifts:   I/O last 3 completed shifts:  In: 150 [IV Piggyback:150]  Out: -   Exam:  Visit Vitals   • /96 (BP Location: Right arm, Patient Position: Lying)   • Pulse 83   • Temp 98.6 °F (37 °C) (Oral)   • Resp 16   • Ht 64.5\" (163.8 cm)   • Wt 182 lb 8 oz (82.8 kg)   • SpO2 99%   • BMI 30.84 kg/m2         General Appearance: Alert, cooperative, no distress,   Head: Normocephalic, without obvious abnormality, atraumatic   ENT: Normal hearing, external inspection, ears and nose   Eyes: Normal pupils/iris, external inspection, conjunctiva, eye lids   Back: No CVA tenderness   Respiratory: Normal effort, palpation, auscultation   CV: Normal auscultation, carotid pulses, no edema   Abdomen: No hernia, soft, non tender, no masses   :    Musculoskeletal: Normal extremities, nails, digitis   Skin: Normal skin color, texture, no rashes or lesion   Neurologic/psych: Normal orientation, mood, affect         CBC:   Results from last 7 days  Lab Units 17  0327   WBC 10*3/mm3 12.18*   RBC 10*6/mm3 4.72     Data Review:     Assessment:  Active Problems:    UTI (urinary tract infection), bacterial    Hx/o stones      Plan:    Stat ct stone protocol      Rad Cobos MD  2017      "

## 2019-07-01 ENCOUNTER — OUTSIDE FACILITY SERVICE (OUTPATIENT)
Dept: INTERNAL MEDICINE | Facility: CLINIC | Age: 59
End: 2019-07-01

## 2019-07-01 PROCEDURE — 99309 SBSQ NF CARE MODERATE MDM 30: CPT | Performed by: NURSE PRACTITIONER

## 2019-09-23 ENCOUNTER — OUTSIDE FACILITY SERVICE (OUTPATIENT)
Dept: INTERNAL MEDICINE | Facility: CLINIC | Age: 59
End: 2019-09-23

## 2019-09-23 PROCEDURE — 99309 SBSQ NF CARE MODERATE MDM 30: CPT | Performed by: NURSE PRACTITIONER

## 2019-11-13 ENCOUNTER — OUTSIDE FACILITY SERVICE (OUTPATIENT)
Dept: INTERNAL MEDICINE | Facility: CLINIC | Age: 59
End: 2019-11-13

## 2019-11-13 PROCEDURE — 99307 SBSQ NF CARE SF MDM 10: CPT | Performed by: FAMILY MEDICINE

## 2019-12-16 ENCOUNTER — OUTSIDE FACILITY SERVICE (OUTPATIENT)
Dept: INTERNAL MEDICINE | Facility: CLINIC | Age: 59
End: 2019-12-16

## 2019-12-16 PROCEDURE — 99309 SBSQ NF CARE MODERATE MDM 30: CPT | Performed by: NURSE PRACTITIONER

## 2021-09-19 ENCOUNTER — HOSPITAL ENCOUNTER (INPATIENT)
Facility: HOSPITAL | Age: 61
LOS: 18 days | Discharge: NURSING, REHAB OR LONG-TERM CARE | End: 2021-10-07
Attending: EMERGENCY MEDICINE | Admitting: HOSPITALIST

## 2021-09-19 ENCOUNTER — APPOINTMENT (OUTPATIENT)
Dept: GENERAL RADIOLOGY | Facility: HOSPITAL | Age: 61
End: 2021-09-19

## 2021-09-19 ENCOUNTER — APPOINTMENT (OUTPATIENT)
Dept: CT IMAGING | Facility: HOSPITAL | Age: 61
End: 2021-09-19

## 2021-09-19 DIAGNOSIS — A41.9 SEPSIS, DUE TO UNSPECIFIED ORGANISM, UNSPECIFIED WHETHER ACUTE ORGAN DYSFUNCTION PRESENT (HCC): Primary | ICD-10-CM

## 2021-09-19 DIAGNOSIS — E87.0 HYPERNATREMIA: ICD-10-CM

## 2021-09-19 DIAGNOSIS — R09.02 HYPOXIA: ICD-10-CM

## 2021-09-19 DIAGNOSIS — J18.9 PNEUMONIA OF BOTH LUNGS DUE TO INFECTIOUS ORGANISM, UNSPECIFIED PART OF LUNG: ICD-10-CM

## 2021-09-19 DIAGNOSIS — E87.5 HYPERKALEMIA: ICD-10-CM

## 2021-09-19 DIAGNOSIS — R13.12 OROPHARYNGEAL DYSPHAGIA: ICD-10-CM

## 2021-09-19 DIAGNOSIS — N17.9 ACUTE RENAL FAILURE, UNSPECIFIED ACUTE RENAL FAILURE TYPE (HCC): ICD-10-CM

## 2021-09-19 DIAGNOSIS — Z66 DO NOT RESUSCITATE: ICD-10-CM

## 2021-09-19 DIAGNOSIS — E86.0 DEHYDRATION: ICD-10-CM

## 2021-09-19 DIAGNOSIS — I95.9 HYPOTENSION, UNSPECIFIED HYPOTENSION TYPE: ICD-10-CM

## 2021-09-19 PROBLEM — R77.8 ELEVATED TROPONIN: Status: ACTIVE | Noted: 2021-09-19

## 2021-09-19 PROBLEM — G40.909 SEIZURE DISORDER (HCC): Status: ACTIVE | Noted: 2021-09-19

## 2021-09-19 PROBLEM — E87.29 HIGH ANION GAP METABOLIC ACIDOSIS: Status: ACTIVE | Noted: 2021-09-19

## 2021-09-19 PROBLEM — I50.33 ACUTE ON CHRONIC DIASTOLIC HEART FAILURE (HCC): Status: ACTIVE | Noted: 2021-09-19

## 2021-09-19 LAB
ALBUMIN SERPL-MCNC: 2.9 G/DL (ref 3.5–5.2)
ALBUMIN/GLOB SERPL: 0.8 G/DL
ALP SERPL-CCNC: 77 U/L (ref 39–117)
ALT SERPL W P-5'-P-CCNC: 11 U/L (ref 1–41)
ANION GAP SERPL CALCULATED.3IONS-SCNC: 21.3 MMOL/L (ref 5–15)
ARTERIAL PATENCY WRIST A: POSITIVE
AST SERPL-CCNC: 20 U/L (ref 1–40)
ATMOSPHERIC PRESS: 752.6 MMHG
B PARAPERT DNA SPEC QL NAA+PROBE: NOT DETECTED
B PERT DNA SPEC QL NAA+PROBE: NOT DETECTED
BASE EXCESS BLDA CALC-SCNC: -15.3 MMOL/L (ref 0–2)
BASOPHILS # BLD AUTO: 0.03 10*3/MM3 (ref 0–0.2)
BASOPHILS NFR BLD AUTO: 0.2 % (ref 0–1.5)
BDY SITE: ABNORMAL
BILIRUB SERPL-MCNC: 0.2 MG/DL (ref 0–1.2)
BUN SERPL-MCNC: 67 MG/DL (ref 8–23)
BUN/CREAT SERPL: 21.1 (ref 7–25)
C PNEUM DNA NPH QL NAA+NON-PROBE: NOT DETECTED
CALCIUM SPEC-SCNC: 11.1 MG/DL (ref 8.6–10.5)
CHLORIDE SERPL-SCNC: 119 MMOL/L (ref 98–107)
CO2 SERPL-SCNC: 10.7 MMOL/L (ref 22–29)
CREAT SERPL-MCNC: 3.17 MG/DL (ref 0.76–1.27)
D-LACTATE SERPL-SCNC: 3 MMOL/L (ref 0.5–2)
D-LACTATE SERPL-SCNC: 3.1 MMOL/L (ref 0.5–2)
DEPRECATED RDW RBC AUTO: 43.3 FL (ref 37–54)
EOSINOPHIL # BLD AUTO: 0 10*3/MM3 (ref 0–0.4)
EOSINOPHIL NFR BLD AUTO: 0 % (ref 0.3–6.2)
ERYTHROCYTE [DISTWIDTH] IN BLOOD BY AUTOMATED COUNT: 14.3 % (ref 12.3–15.4)
FLUAV SUBTYP SPEC NAA+PROBE: NOT DETECTED
FLUBV RNA ISLT QL NAA+PROBE: NOT DETECTED
GAS FLOW AIRWAY: 9 LPM
GFR SERPL CREATININE-BSD FRML MDRD: 20 ML/MIN/1.73
GFR SERPL CREATININE-BSD FRML MDRD: 24 ML/MIN/1.73
GLOBULIN UR ELPH-MCNC: 3.5 GM/DL
GLUCOSE SERPL-MCNC: 172 MG/DL (ref 65–99)
HADV DNA SPEC NAA+PROBE: NOT DETECTED
HCO3 BLDA-SCNC: 8 MMOL/L (ref 22–28)
HCOV 229E RNA SPEC QL NAA+PROBE: NOT DETECTED
HCOV HKU1 RNA SPEC QL NAA+PROBE: NOT DETECTED
HCOV NL63 RNA SPEC QL NAA+PROBE: NOT DETECTED
HCOV OC43 RNA SPEC QL NAA+PROBE: NOT DETECTED
HCT VFR BLD AUTO: 37.2 % (ref 37.5–51)
HGB BLD-MCNC: 12.2 G/DL (ref 13–17.7)
HMPV RNA NPH QL NAA+NON-PROBE: NOT DETECTED
HPIV1 RNA SPEC QL NAA+PROBE: NOT DETECTED
HPIV2 RNA SPEC QL NAA+PROBE: NOT DETECTED
HPIV3 RNA NPH QL NAA+PROBE: NOT DETECTED
HPIV4 P GENE NPH QL NAA+PROBE: NOT DETECTED
IMM GRANULOCYTES # BLD AUTO: 0.11 10*3/MM3 (ref 0–0.05)
IMM GRANULOCYTES NFR BLD AUTO: 0.7 % (ref 0–0.5)
INR PPP: 1.7 (ref 0.9–1.1)
LYMPHOCYTES # BLD AUTO: 2 10*3/MM3 (ref 0.7–3.1)
LYMPHOCYTES NFR BLD AUTO: 13.2 % (ref 19.6–45.3)
M PNEUMO IGG SER IA-ACNC: NOT DETECTED
MCH RBC QN AUTO: 27.6 PG (ref 26.6–33)
MCHC RBC AUTO-ENTMCNC: 32.8 G/DL (ref 31.5–35.7)
MCV RBC AUTO: 84.2 FL (ref 79–97)
MODALITY: ABNORMAL
MONOCYTES # BLD AUTO: 1.43 10*3/MM3 (ref 0.1–0.9)
MONOCYTES NFR BLD AUTO: 9.4 % (ref 5–12)
NEUTROPHILS NFR BLD AUTO: 11.63 10*3/MM3 (ref 1.7–7)
NEUTROPHILS NFR BLD AUTO: 76.5 % (ref 42.7–76)
NRBC BLD AUTO-RTO: 0 /100 WBC (ref 0–0.2)
NT-PROBNP SERPL-MCNC: 5142 PG/ML (ref 0–900)
PCO2 BLDA: 14.9 MM HG (ref 35–45)
PH BLDA: 7.34 PH UNITS (ref 7.35–7.45)
PLATELET # BLD AUTO: 422 10*3/MM3 (ref 140–450)
PMV BLD AUTO: 10.2 FL (ref 6–12)
PO2 BLDA: 109.8 MM HG (ref 80–100)
POTASSIUM SERPL-SCNC: 5.6 MMOL/L (ref 3.5–5.2)
PROCALCITONIN SERPL-MCNC: 1.88 NG/ML (ref 0–0.25)
PROT SERPL-MCNC: 6.4 G/DL (ref 6–8.5)
PROTHROMBIN TIME: 19.7 SECONDS (ref 11.7–14.2)
RBC # BLD AUTO: 4.42 10*6/MM3 (ref 4.14–5.8)
RHINOVIRUS RNA SPEC NAA+PROBE: NOT DETECTED
RSV RNA NPH QL NAA+NON-PROBE: NOT DETECTED
SAO2 % BLDCOA: 98.2 % (ref 92–99)
SARS-COV-2 RNA NPH QL NAA+NON-PROBE: NOT DETECTED
SODIUM SERPL-SCNC: 151 MMOL/L (ref 136–145)
TOTAL RATE: 28 BREATHS/MINUTE
TROPONIN T SERPL-MCNC: 0.1 NG/ML (ref 0–0.03)
WBC # BLD AUTO: 15.2 10*3/MM3 (ref 3.4–10.8)

## 2021-09-19 PROCEDURE — 87150 DNA/RNA AMPLIFIED PROBE: CPT | Performed by: EMERGENCY MEDICINE

## 2021-09-19 PROCEDURE — 71045 X-RAY EXAM CHEST 1 VIEW: CPT

## 2021-09-19 PROCEDURE — 25010000002 PIPERACILLIN SOD-TAZOBACTAM PER 1 G: Performed by: EMERGENCY MEDICINE

## 2021-09-19 PROCEDURE — 84145 PROCALCITONIN (PCT): CPT | Performed by: EMERGENCY MEDICINE

## 2021-09-19 PROCEDURE — 74176 CT ABD & PELVIS W/O CONTRAST: CPT

## 2021-09-19 PROCEDURE — 84484 ASSAY OF TROPONIN QUANT: CPT | Performed by: EMERGENCY MEDICINE

## 2021-09-19 PROCEDURE — 93010 ELECTROCARDIOGRAM REPORT: CPT | Performed by: INTERNAL MEDICINE

## 2021-09-19 PROCEDURE — 87040 BLOOD CULTURE FOR BACTERIA: CPT | Performed by: EMERGENCY MEDICINE

## 2021-09-19 PROCEDURE — 36600 WITHDRAWAL OF ARTERIAL BLOOD: CPT

## 2021-09-19 PROCEDURE — 85025 COMPLETE CBC W/AUTO DIFF WBC: CPT | Performed by: EMERGENCY MEDICINE

## 2021-09-19 PROCEDURE — 83605 ASSAY OF LACTIC ACID: CPT | Performed by: EMERGENCY MEDICINE

## 2021-09-19 PROCEDURE — 87186 SC STD MICRODIL/AGAR DIL: CPT | Performed by: EMERGENCY MEDICINE

## 2021-09-19 PROCEDURE — 93005 ELECTROCARDIOGRAM TRACING: CPT | Performed by: EMERGENCY MEDICINE

## 2021-09-19 PROCEDURE — 99285 EMERGENCY DEPT VISIT HI MDM: CPT

## 2021-09-19 PROCEDURE — 85610 PROTHROMBIN TIME: CPT | Performed by: EMERGENCY MEDICINE

## 2021-09-19 PROCEDURE — 0202U NFCT DS 22 TRGT SARS-COV-2: CPT | Performed by: EMERGENCY MEDICINE

## 2021-09-19 PROCEDURE — 87147 CULTURE TYPE IMMUNOLOGIC: CPT | Performed by: EMERGENCY MEDICINE

## 2021-09-19 PROCEDURE — 71250 CT THORAX DX C-: CPT

## 2021-09-19 PROCEDURE — 80053 COMPREHEN METABOLIC PANEL: CPT | Performed by: EMERGENCY MEDICINE

## 2021-09-19 PROCEDURE — 82803 BLOOD GASES ANY COMBINATION: CPT

## 2021-09-19 PROCEDURE — 83880 ASSAY OF NATRIURETIC PEPTIDE: CPT | Performed by: EMERGENCY MEDICINE

## 2021-09-19 PROCEDURE — 25010000002 VANCOMYCIN 10 G RECONSTITUTED SOLUTION: Performed by: EMERGENCY MEDICINE

## 2021-09-19 RX ORDER — BISACODYL 10 MG
10 SUPPOSITORY, RECTAL RECTAL DAILY PRN
Status: DISCONTINUED | OUTPATIENT
Start: 2021-09-19 | End: 2021-10-07 | Stop reason: HOSPADM

## 2021-09-19 RX ORDER — SODIUM POLYSTYRENE SULFONATE 15 G/60ML
15 SUSPENSION ORAL; RECTAL ONCE
Status: DISCONTINUED | OUTPATIENT
Start: 2021-09-19 | End: 2021-09-24

## 2021-09-19 RX ORDER — ONDANSETRON 2 MG/ML
4 INJECTION INTRAMUSCULAR; INTRAVENOUS EVERY 6 HOURS PRN
Status: DISCONTINUED | OUTPATIENT
Start: 2021-09-19 | End: 2021-10-07 | Stop reason: HOSPADM

## 2021-09-19 RX ORDER — SODIUM CHLORIDE 9 MG/ML
125 INJECTION, SOLUTION INTRAVENOUS CONTINUOUS
Status: DISCONTINUED | OUTPATIENT
Start: 2021-09-19 | End: 2021-09-21

## 2021-09-19 RX ORDER — SODIUM CHLORIDE 0.9 % (FLUSH) 0.9 %
10 SYRINGE (ML) INJECTION AS NEEDED
Status: DISCONTINUED | OUTPATIENT
Start: 2021-09-19 | End: 2021-10-07 | Stop reason: HOSPADM

## 2021-09-19 RX ORDER — ESCITALOPRAM OXALATE 10 MG/1
10 TABLET ORAL DAILY
Status: DISCONTINUED | OUTPATIENT
Start: 2021-09-20 | End: 2021-09-22

## 2021-09-19 RX ORDER — NITROGLYCERIN 0.4 MG/1
0.4 TABLET SUBLINGUAL
Status: DISCONTINUED | OUTPATIENT
Start: 2021-09-19 | End: 2021-10-07 | Stop reason: HOSPADM

## 2021-09-19 RX ORDER — PHENYTOIN 125 MG/5ML
300 SUSPENSION ORAL NIGHTLY
Status: DISCONTINUED | OUTPATIENT
Start: 2021-09-19 | End: 2021-09-21

## 2021-09-19 RX ORDER — LEVETIRACETAM 5 MG/ML
500 INJECTION INTRAVASCULAR EVERY 12 HOURS SCHEDULED
Status: DISCONTINUED | OUTPATIENT
Start: 2021-09-19 | End: 2021-09-24 | Stop reason: CLARIF

## 2021-09-19 RX ORDER — ONDANSETRON 4 MG/1
4 TABLET, FILM COATED ORAL EVERY 6 HOURS PRN
Status: DISCONTINUED | OUTPATIENT
Start: 2021-09-19 | End: 2021-10-07 | Stop reason: HOSPADM

## 2021-09-19 RX ORDER — SODIUM CHLORIDE 0.9 % (FLUSH) 0.9 %
10 SYRINGE (ML) INJECTION EVERY 12 HOURS SCHEDULED
Status: DISCONTINUED | OUTPATIENT
Start: 2021-09-19 | End: 2021-10-07 | Stop reason: HOSPADM

## 2021-09-19 RX ORDER — ACETAMINOPHEN 325 MG/1
650 TABLET ORAL EVERY 6 HOURS PRN
Status: DISCONTINUED | OUTPATIENT
Start: 2021-09-19 | End: 2021-10-07 | Stop reason: HOSPADM

## 2021-09-19 RX ORDER — SODIUM CHLORIDE, SODIUM LACTATE, POTASSIUM CHLORIDE, CALCIUM CHLORIDE 600; 310; 30; 20 MG/100ML; MG/100ML; MG/100ML; MG/100ML
100 INJECTION, SOLUTION INTRAVENOUS CONTINUOUS
Status: DISCONTINUED | OUTPATIENT
Start: 2021-09-19 | End: 2021-09-19

## 2021-09-19 RX ORDER — WARFARIN SODIUM 4 MG/1
4 TABLET ORAL
Status: DISCONTINUED | OUTPATIENT
Start: 2021-09-19 | End: 2021-09-20 | Stop reason: DRUGHIGH

## 2021-09-19 RX ADMIN — VANCOMYCIN HYDROCHLORIDE 1500 MG: 10 INJECTION, POWDER, LYOPHILIZED, FOR SOLUTION INTRAVENOUS at 22:00

## 2021-09-19 RX ADMIN — SODIUM CHLORIDE 1000 ML: 9 INJECTION, SOLUTION INTRAVENOUS at 16:57

## 2021-09-19 RX ADMIN — SODIUM CHLORIDE 2178 ML: 9 INJECTION, SOLUTION INTRAVENOUS at 18:15

## 2021-09-19 RX ADMIN — TAZOBACTAM SODIUM AND PIPERACILLIN SODIUM 3.38 G: 375; 3 INJECTION, SOLUTION INTRAVENOUS at 18:21

## 2021-09-20 PROBLEM — E11.9 TYPE 2 DIABETES MELLITUS (HCC): Status: ACTIVE | Noted: 2021-09-20

## 2021-09-20 PROBLEM — E83.52 HYPERCALCEMIA: Status: ACTIVE | Noted: 2021-09-20

## 2021-09-20 LAB
BACTERIA UR QL AUTO: ABNORMAL /HPF
BILIRUB UR QL STRIP: NEGATIVE
CLARITY UR: CLEAR
COLOR UR: YELLOW
GLUCOSE BLDC GLUCOMTR-MCNC: 109 MG/DL (ref 70–130)
GLUCOSE UR STRIP-MCNC: NEGATIVE MG/DL
HGB UR QL STRIP.AUTO: ABNORMAL
HYALINE CASTS UR QL AUTO: ABNORMAL /LPF
KETONES UR QL STRIP: ABNORMAL
LEUKOCYTE ESTERASE UR QL STRIP.AUTO: ABNORMAL
MRSA DNA SPEC QL NAA+PROBE: ABNORMAL
NITRITE UR QL STRIP: NEGATIVE
PH UR STRIP.AUTO: 5.5 [PH] (ref 5–8)
PROT UR QL STRIP: ABNORMAL
RBC # UR: ABNORMAL /HPF
REF LAB TEST METHOD: ABNORMAL
SP GR UR STRIP: 1.02 (ref 1–1.03)
SQUAMOUS #/AREA URNS HPF: ABNORMAL /HPF
UROBILINOGEN UR QL STRIP: ABNORMAL
WBC UR QL AUTO: ABNORMAL /HPF

## 2021-09-20 PROCEDURE — 82962 GLUCOSE BLOOD TEST: CPT

## 2021-09-20 PROCEDURE — 25010000002 LEVETIRACETAM IN NACL 0.82% 500 MG/100ML SOLUTION: Performed by: HOSPITALIST

## 2021-09-20 PROCEDURE — 25010000002 CEFEPIME PER 500 MG: Performed by: HOSPITALIST

## 2021-09-20 PROCEDURE — 87641 MR-STAPH DNA AMP PROBE: CPT | Performed by: HOSPITALIST

## 2021-09-20 PROCEDURE — 81001 URINALYSIS AUTO W/SCOPE: CPT | Performed by: EMERGENCY MEDICINE

## 2021-09-20 PROCEDURE — 25010000002 PIPERACILLIN SOD-TAZOBACTAM PER 1 G: Performed by: HOSPITALIST

## 2021-09-20 PROCEDURE — 25010000002 ENOXAPARIN PER 10 MG: Performed by: HOSPITALIST

## 2021-09-20 RX ORDER — INSULIN LISPRO 100 [IU]/ML
0-9 INJECTION, SOLUTION INTRAVENOUS; SUBCUTANEOUS
Status: DISCONTINUED | OUTPATIENT
Start: 2021-09-20 | End: 2021-10-07 | Stop reason: HOSPADM

## 2021-09-20 RX ORDER — BISACODYL 10 MG
10 SUPPOSITORY, RECTAL RECTAL DAILY PRN
Status: DISCONTINUED | OUTPATIENT
Start: 2021-09-20 | End: 2021-09-20 | Stop reason: SDUPTHER

## 2021-09-20 RX ORDER — WARFARIN SODIUM 5 MG/1
5 TABLET ORAL
Status: DISCONTINUED | OUTPATIENT
Start: 2021-09-20 | End: 2021-09-20

## 2021-09-20 RX ORDER — DEXTROSE MONOHYDRATE 25 G/50ML
25 INJECTION, SOLUTION INTRAVENOUS
Status: DISCONTINUED | OUTPATIENT
Start: 2021-09-20 | End: 2021-10-07 | Stop reason: HOSPADM

## 2021-09-20 RX ORDER — WARFARIN SODIUM 4 MG/1
4 TABLET ORAL
Status: DISCONTINUED | OUTPATIENT
Start: 2021-09-21 | End: 2021-09-20

## 2021-09-20 RX ORDER — NICOTINE POLACRILEX 4 MG
15 LOZENGE BUCCAL
Status: DISCONTINUED | OUTPATIENT
Start: 2021-09-20 | End: 2021-10-07 | Stop reason: HOSPADM

## 2021-09-20 RX ADMIN — LEVETIRACETAM 500 MG: 500 INJECTION, SOLUTION INTRAVENOUS at 20:58

## 2021-09-20 RX ADMIN — SODIUM CHLORIDE 125 ML/HR: 9 INJECTION, SOLUTION INTRAVENOUS at 22:05

## 2021-09-20 RX ADMIN — LEVETIRACETAM 500 MG: 500 INJECTION, SOLUTION INTRAVENOUS at 01:09

## 2021-09-20 RX ADMIN — CEFEPIME HYDROCHLORIDE 2 G: 2 INJECTION, POWDER, FOR SOLUTION INTRAVENOUS at 13:40

## 2021-09-20 RX ADMIN — SODIUM CHLORIDE, PRESERVATIVE FREE 10 ML: 5 INJECTION INTRAVENOUS at 02:32

## 2021-09-20 RX ADMIN — SODIUM CHLORIDE 500 ML: 9 INJECTION, SOLUTION INTRAVENOUS at 01:13

## 2021-09-20 RX ADMIN — SODIUM CHLORIDE, PRESERVATIVE FREE 10 ML: 5 INJECTION INTRAVENOUS at 20:59

## 2021-09-20 RX ADMIN — SODIUM CHLORIDE 500 ML: 9 INJECTION, SOLUTION INTRAVENOUS at 16:28

## 2021-09-20 RX ADMIN — SODIUM CHLORIDE 500 ML: 9 INJECTION, SOLUTION INTRAVENOUS at 04:41

## 2021-09-20 RX ADMIN — TAZOBACTAM SODIUM AND PIPERACILLIN SODIUM 3.38 G: 375; 3 INJECTION, SOLUTION INTRAVENOUS at 04:30

## 2021-09-20 RX ADMIN — LEVETIRACETAM 500 MG: 500 INJECTION, SOLUTION INTRAVENOUS at 08:58

## 2021-09-20 RX ADMIN — SODIUM CHLORIDE 125 ML/HR: 9 INJECTION, SOLUTION INTRAVENOUS at 02:34

## 2021-09-20 RX ADMIN — ENOXAPARIN SODIUM 30 MG: 30 INJECTION SUBCUTANEOUS at 13:40

## 2021-09-20 RX ADMIN — TAZOBACTAM SODIUM AND PIPERACILLIN SODIUM 3.38 G: 375; 3 INJECTION, SOLUTION INTRAVENOUS at 08:58

## 2021-09-20 RX ADMIN — BISACODYL 10 MG: 10 SUPPOSITORY RECTAL at 08:58

## 2021-09-21 ENCOUNTER — APPOINTMENT (OUTPATIENT)
Dept: GENERAL RADIOLOGY | Facility: HOSPITAL | Age: 61
End: 2021-09-21

## 2021-09-21 ENCOUNTER — APPOINTMENT (OUTPATIENT)
Dept: CARDIOLOGY | Facility: HOSPITAL | Age: 61
End: 2021-09-21

## 2021-09-21 PROBLEM — E87.5 HYPERKALEMIA: Status: RESOLVED | Noted: 2021-09-19 | Resolved: 2021-09-21

## 2021-09-21 LAB
ALBUMIN SERPL-MCNC: 2.1 G/DL (ref 3.5–5.2)
ALBUMIN/GLOB SERPL: 0.8 G/DL
ALP SERPL-CCNC: 46 U/L (ref 39–117)
ALT SERPL W P-5'-P-CCNC: 17 U/L (ref 1–41)
ANION GAP SERPL CALCULATED.3IONS-SCNC: 12.5 MMOL/L (ref 5–15)
ANION GAP SERPL CALCULATED.3IONS-SCNC: 12.7 MMOL/L (ref 5–15)
AST SERPL-CCNC: 42 U/L (ref 1–40)
BACTERIA BLD CULT: ABNORMAL
BH CV ECHO MEAS - AO MAX PG (FULL): 1.1 MMHG
BH CV ECHO MEAS - AO MAX PG: 3 MMHG
BH CV ECHO MEAS - AO MEAN PG (FULL): 1 MMHG
BH CV ECHO MEAS - AO MEAN PG: 2 MMHG
BH CV ECHO MEAS - AO V2 MAX: 86.2 CM/SEC
BH CV ECHO MEAS - AO V2 MEAN: 59.2 CM/SEC
BH CV ECHO MEAS - AO V2 VTI: 15.1 CM
BH CV ECHO MEAS - BSA(HAYCOCK): 1.5 M^2
BH CV ECHO MEAS - BSA: 1.5 M^2
BH CV ECHO MEAS - BZI_BMI: 18.2 KILOGRAMS/M^2
BH CV ECHO MEAS - BZI_METRIC_HEIGHT: 162.6 CM
BH CV ECHO MEAS - BZI_METRIC_WEIGHT: 48.1 KG
BH CV ECHO MEAS - EDV(CUBED): 35.9 ML
BH CV ECHO MEAS - EDV(MOD-SP4): 22 ML
BH CV ECHO MEAS - EDV(TEICH): 44.1 ML
BH CV ECHO MEAS - EF(CUBED): 70.4 %
BH CV ECHO MEAS - EF(MOD-SP4): 59.1 %
BH CV ECHO MEAS - EF(TEICH): 63.3 %
BH CV ECHO MEAS - ESV(CUBED): 10.6 ML
BH CV ECHO MEAS - ESV(MOD-SP4): 9 ML
BH CV ECHO MEAS - ESV(TEICH): 16.2 ML
BH CV ECHO MEAS - FS: 33.3 %
BH CV ECHO MEAS - IVS/LVPW: 1.4
BH CV ECHO MEAS - IVSD: 1 CM
BH CV ECHO MEAS - LAT PEAK E' VEL: 7.7 CM/SEC
BH CV ECHO MEAS - LV DIASTOLIC VOL/BSA (35-75): 14.7 ML/M^2
BH CV ECHO MEAS - LV MASS(C)D: 74.7 GRAMS
BH CV ECHO MEAS - LV MASS(C)DI: 50 GRAMS/M^2
BH CV ECHO MEAS - LV MAX PG: 1.8 MMHG
BH CV ECHO MEAS - LV MEAN PG: 1 MMHG
BH CV ECHO MEAS - LV SYSTOLIC VOL/BSA (12-30): 6 ML/M^2
BH CV ECHO MEAS - LV V1 MAX: 67.9 CM/SEC
BH CV ECHO MEAS - LV V1 MEAN: 51.5 CM/SEC
BH CV ECHO MEAS - LV V1 VTI: 11.9 CM
BH CV ECHO MEAS - LVIDD: 3.3 CM
BH CV ECHO MEAS - LVIDS: 2.2 CM
BH CV ECHO MEAS - LVLD AP4: 5.3 CM
BH CV ECHO MEAS - LVLS AP4: 5.2 CM
BH CV ECHO MEAS - LVPWD: 0.7 CM
BH CV ECHO MEAS - MED PEAK E' VEL: 7.8 CM/SEC
BH CV ECHO MEAS - MV A MAX VEL: 84.8 CM/SEC
BH CV ECHO MEAS - MV DEC SLOPE: 438.5 CM/SEC^2
BH CV ECHO MEAS - MV DEC TIME: 0.25 SEC
BH CV ECHO MEAS - MV E MAX VEL: 75.8 CM/SEC
BH CV ECHO MEAS - MV E/A: 0.89
BH CV ECHO MEAS - MV MAX PG: 3 MMHG
BH CV ECHO MEAS - MV MEAN PG: 2 MMHG
BH CV ECHO MEAS - MV P1/2T MAX VEL: 86.9 CM/SEC
BH CV ECHO MEAS - MV P1/2T: 58 MSEC
BH CV ECHO MEAS - MV V2 MAX: 86.1 CM/SEC
BH CV ECHO MEAS - MV V2 MEAN: 69.7 CM/SEC
BH CV ECHO MEAS - MV V2 VTI: 18.7 CM
BH CV ECHO MEAS - MVA P1/2T LCG: 2.5 CM^2
BH CV ECHO MEAS - MVA(P1/2T): 3.8 CM^2
BH CV ECHO MEAS - SI(CUBED): 16.9 ML/M^2
BH CV ECHO MEAS - SI(MOD-SP4): 8.7 ML/M^2
BH CV ECHO MEAS - SI(TEICH): 18.7 ML/M^2
BH CV ECHO MEAS - SV(CUBED): 25.3 ML
BH CV ECHO MEAS - SV(MOD-SP4): 13 ML
BH CV ECHO MEAS - SV(TEICH): 27.9 ML
BH CV ECHO MEASUREMENTS AVERAGE E/E' RATIO: 9.78
BILIRUB SERPL-MCNC: <0.2 MG/DL (ref 0–1.2)
BUN SERPL-MCNC: 50 MG/DL (ref 8–23)
BUN SERPL-MCNC: 50 MG/DL (ref 8–23)
BUN/CREAT SERPL: 32.7 (ref 7–25)
BUN/CREAT SERPL: 32.7 (ref 7–25)
CALCIUM SPEC-SCNC: 9.1 MG/DL (ref 8.6–10.5)
CALCIUM SPEC-SCNC: 9.3 MG/DL (ref 8.6–10.5)
CHLORIDE SERPL-SCNC: 133 MMOL/L (ref 98–107)
CHLORIDE SERPL-SCNC: 134 MMOL/L (ref 98–107)
CO2 SERPL-SCNC: 10.5 MMOL/L (ref 22–29)
CO2 SERPL-SCNC: 11.3 MMOL/L (ref 22–29)
CREAT SERPL-MCNC: 1.53 MG/DL (ref 0.76–1.27)
CREAT SERPL-MCNC: 1.53 MG/DL (ref 0.76–1.27)
DEPRECATED RDW RBC AUTO: 40.4 FL (ref 37–54)
ERYTHROCYTE [DISTWIDTH] IN BLOOD BY AUTOMATED COUNT: 13.9 % (ref 12.3–15.4)
GFR SERPL CREATININE-BSD FRML MDRD: 47 ML/MIN/1.73
GFR SERPL CREATININE-BSD FRML MDRD: 47 ML/MIN/1.73
GFR SERPL CREATININE-BSD FRML MDRD: 56 ML/MIN/1.73
GFR SERPL CREATININE-BSD FRML MDRD: 56 ML/MIN/1.73
GLOBULIN UR ELPH-MCNC: 2.7 GM/DL
GLUCOSE BLDC GLUCOMTR-MCNC: 101 MG/DL (ref 70–130)
GLUCOSE BLDC GLUCOMTR-MCNC: 122 MG/DL (ref 70–130)
GLUCOSE BLDC GLUCOMTR-MCNC: 131 MG/DL (ref 70–130)
GLUCOSE BLDC GLUCOMTR-MCNC: 51 MG/DL (ref 70–130)
GLUCOSE BLDC GLUCOMTR-MCNC: 70 MG/DL (ref 70–130)
GLUCOSE SERPL-MCNC: 123 MG/DL (ref 65–99)
GLUCOSE SERPL-MCNC: 56 MG/DL (ref 65–99)
HCT VFR BLD AUTO: 28 % (ref 37.5–51)
HGB BLD-MCNC: 9.4 G/DL (ref 13–17.7)
INR PPP: 2.04 (ref 0.9–1.1)
LV EF 2D ECHO EST: 65 %
MAGNESIUM SERPL-MCNC: 1.2 MG/DL (ref 1.6–2.4)
MCH RBC QN AUTO: 27.5 PG (ref 26.6–33)
MCHC RBC AUTO-ENTMCNC: 33.6 G/DL (ref 31.5–35.7)
MCV RBC AUTO: 81.9 FL (ref 79–97)
PHENYTOIN SERPL-MCNC: 4.1 MCG/ML (ref 10–20)
PHOSPHATE SERPL-MCNC: 2.3 MG/DL (ref 2.5–4.5)
PLATELET # BLD AUTO: 254 10*3/MM3 (ref 140–450)
PMV BLD AUTO: 10.3 FL (ref 6–12)
POTASSIUM SERPL-SCNC: 3.6 MMOL/L (ref 3.5–5.2)
POTASSIUM SERPL-SCNC: 3.7 MMOL/L (ref 3.5–5.2)
PROT SERPL-MCNC: 4.8 G/DL (ref 6–8.5)
PROTHROMBIN TIME: 22.8 SECONDS (ref 11.7–14.2)
RBC # BLD AUTO: 3.42 10*6/MM3 (ref 4.14–5.8)
SODIUM SERPL-SCNC: 157 MMOL/L (ref 136–145)
SODIUM SERPL-SCNC: 157 MMOL/L (ref 136–145)
TROPONIN T SERPL-MCNC: 0.03 NG/ML (ref 0–0.03)
VANCOMYCIN SERPL-MCNC: 11 MCG/ML (ref 5–40)
WBC # BLD AUTO: 11.98 10*3/MM3 (ref 3.4–10.8)

## 2021-09-21 PROCEDURE — 83735 ASSAY OF MAGNESIUM: CPT | Performed by: HOSPITALIST

## 2021-09-21 PROCEDURE — C1751 CATH, INF, PER/CENT/MIDLINE: HCPCS

## 2021-09-21 PROCEDURE — 85610 PROTHROMBIN TIME: CPT | Performed by: HOSPITALIST

## 2021-09-21 PROCEDURE — 25010000002 VANCOMYCIN 750 MG RECONSTITUTED SOLUTION: Performed by: HOSPITALIST

## 2021-09-21 PROCEDURE — 80186 ASSAY OF PHENYTOIN FREE: CPT | Performed by: HOSPITALIST

## 2021-09-21 PROCEDURE — 84484 ASSAY OF TROPONIN QUANT: CPT | Performed by: HOSPITALIST

## 2021-09-21 PROCEDURE — 93306 TTE W/DOPPLER COMPLETE: CPT | Performed by: INTERNAL MEDICINE

## 2021-09-21 PROCEDURE — 85027 COMPLETE CBC AUTOMATED: CPT | Performed by: HOSPITALIST

## 2021-09-21 PROCEDURE — 80202 ASSAY OF VANCOMYCIN: CPT | Performed by: HOSPITALIST

## 2021-09-21 PROCEDURE — 80053 COMPREHEN METABOLIC PANEL: CPT | Performed by: HOSPITALIST

## 2021-09-21 PROCEDURE — 25010000002 ENOXAPARIN PER 10 MG: Performed by: HOSPITALIST

## 2021-09-21 PROCEDURE — 05HY33Z INSERTION OF INFUSION DEVICE INTO UPPER VEIN, PERCUTANEOUS APPROACH: ICD-10-PCS | Performed by: NURSE PRACTITIONER

## 2021-09-21 PROCEDURE — 25810000003 DEXTROSE 5 % WITH KCL 20 MEQ 20-5 MEQ/L-% SOLUTION: Performed by: HOSPITALIST

## 2021-09-21 PROCEDURE — 25010000003 POTASSIUM CHLORIDE 10 MEQ/100ML SOLUTION: Performed by: HOSPITALIST

## 2021-09-21 PROCEDURE — 25010000002 CEFEPIME PER 500 MG: Performed by: HOSPITALIST

## 2021-09-21 PROCEDURE — 25010000002 FOSPHENYTOIN 100 MG PE/2ML SOLUTION: Performed by: HOSPITALIST

## 2021-09-21 PROCEDURE — 82962 GLUCOSE BLOOD TEST: CPT

## 2021-09-21 PROCEDURE — 25010000002 PERFLUTREN (DEFINITY) 8.476 MG IN SODIUM CHLORIDE (PF) 0.9 % 10 ML INJECTION: Performed by: HOSPITALIST

## 2021-09-21 PROCEDURE — 25010000002 MAGNESIUM SULFATE 2 GM/50ML SOLUTION: Performed by: HOSPITALIST

## 2021-09-21 PROCEDURE — 93306 TTE W/DOPPLER COMPLETE: CPT

## 2021-09-21 PROCEDURE — 92610 EVALUATE SWALLOWING FUNCTION: CPT

## 2021-09-21 PROCEDURE — 80185 ASSAY OF PHENYTOIN TOTAL: CPT | Performed by: HOSPITALIST

## 2021-09-21 PROCEDURE — 25010000002 LEVETIRACETAM IN NACL 0.82% 500 MG/100ML SOLUTION: Performed by: HOSPITALIST

## 2021-09-21 PROCEDURE — 84100 ASSAY OF PHOSPHORUS: CPT | Performed by: HOSPITALIST

## 2021-09-21 PROCEDURE — 99221 1ST HOSP IP/OBS SF/LOW 40: CPT | Performed by: NURSE PRACTITIONER

## 2021-09-21 RX ORDER — MAGNESIUM SULFATE HEPTAHYDRATE 40 MG/ML
2 INJECTION, SOLUTION INTRAVENOUS AS NEEDED
Status: DISCONTINUED | OUTPATIENT
Start: 2021-09-21 | End: 2021-09-28

## 2021-09-21 RX ORDER — ACETAMINOPHEN 650 MG/1
650 SUPPOSITORY RECTAL EVERY 6 HOURS PRN
Status: DISCONTINUED | OUTPATIENT
Start: 2021-09-21 | End: 2021-10-07 | Stop reason: HOSPADM

## 2021-09-21 RX ORDER — LANOLIN ALCOHOL/MO/W.PET/CERES
1000 CREAM (GRAM) TOPICAL DAILY
Status: ON HOLD | COMMUNITY
End: 2021-10-07 | Stop reason: SDUPTHER

## 2021-09-21 RX ORDER — ONDANSETRON 4 MG/1
4 TABLET, FILM COATED ORAL EVERY 4 HOURS PRN
Status: ON HOLD | COMMUNITY
End: 2021-10-07 | Stop reason: SDUPTHER

## 2021-09-21 RX ORDER — POTASSIUM CHLORIDE 7.45 MG/ML
10 INJECTION INTRAVENOUS
Status: DISCONTINUED | OUTPATIENT
Start: 2021-09-21 | End: 2021-10-07 | Stop reason: HOSPADM

## 2021-09-21 RX ORDER — AMLODIPINE BESYLATE 10 MG/1
10 TABLET ORAL DAILY
COMMUNITY
End: 2021-10-07 | Stop reason: HOSPADM

## 2021-09-21 RX ORDER — POTASSIUM CHLORIDE, DEXTROSE MONOHYDRATE 150; 5 MG/100ML; G/100ML
125 INJECTION, SOLUTION INTRAVENOUS CONTINUOUS
Status: DISCONTINUED | OUTPATIENT
Start: 2021-09-21 | End: 2021-09-21

## 2021-09-21 RX ORDER — MAGNESIUM L-LACTATE 84 MG
84 TABLET, EXTENDED RELEASE ORAL DAILY
COMMUNITY
End: 2021-10-07 | Stop reason: HOSPADM

## 2021-09-21 RX ORDER — ACETAMINOPHEN 500 MG
500 TABLET ORAL EVERY 6 HOURS PRN
COMMUNITY
End: 2021-10-07 | Stop reason: HOSPADM

## 2021-09-21 RX ORDER — MAGNESIUM SULFATE HEPTAHYDRATE 40 MG/ML
4 INJECTION, SOLUTION INTRAVENOUS AS NEEDED
Status: DISCONTINUED | OUTPATIENT
Start: 2021-09-21 | End: 2021-09-28

## 2021-09-21 RX ORDER — BACLOFEN 10 MG/1
5 TABLET ORAL 2 TIMES DAILY
Status: ON HOLD | COMMUNITY
End: 2021-10-07 | Stop reason: SDUPTHER

## 2021-09-21 RX ORDER — PHENYTOIN SODIUM 100 MG/1
100 CAPSULE, EXTENDED RELEASE ORAL 2 TIMES DAILY
COMMUNITY
End: 2021-10-07 | Stop reason: HOSPADM

## 2021-09-21 RX ORDER — POTASSIUM CHLORIDE, DEXTROSE MONOHYDRATE 150; 5 MG/100ML; G/100ML
125 INJECTION, SOLUTION INTRAVENOUS CONTINUOUS
Status: DISCONTINUED | OUTPATIENT
Start: 2021-09-21 | End: 2021-09-23

## 2021-09-21 RX ORDER — ATORVASTATIN CALCIUM 40 MG/1
40 TABLET, FILM COATED ORAL NIGHTLY
Status: ON HOLD | COMMUNITY
End: 2021-10-07 | Stop reason: SDUPTHER

## 2021-09-21 RX ORDER — FUROSEMIDE 20 MG/1
10 TABLET ORAL DAILY
Status: ON HOLD | COMMUNITY
End: 2021-10-07 | Stop reason: SDUPTHER

## 2021-09-21 RX ORDER — MELATONIN
1000 2 TIMES DAILY
Status: ON HOLD | COMMUNITY
End: 2021-10-07 | Stop reason: SDUPTHER

## 2021-09-21 RX ORDER — MIRTAZAPINE 15 MG/1
15 TABLET, FILM COATED ORAL NIGHTLY
Status: ON HOLD | COMMUNITY
End: 2021-10-07 | Stop reason: SDUPTHER

## 2021-09-21 RX ORDER — LACTULOSE 10 G/15ML
20 SOLUTION ORAL 2 TIMES DAILY PRN
Status: ON HOLD | COMMUNITY
End: 2021-10-07 | Stop reason: SDUPTHER

## 2021-09-21 RX ORDER — METOPROLOL TARTRATE 50 MG/1
50 TABLET, FILM COATED ORAL 2 TIMES DAILY
COMMUNITY
End: 2021-10-07 | Stop reason: HOSPADM

## 2021-09-21 RX ORDER — FOSPHENYTOIN SODIUM 50 MG/ML
100 INJECTION, SOLUTION INTRAMUSCULAR; INTRAVENOUS EVERY 12 HOURS SCHEDULED
Status: DISCONTINUED | OUTPATIENT
Start: 2021-09-21 | End: 2021-09-24 | Stop reason: CLARIF

## 2021-09-21 RX ORDER — DIPHENOXYLATE HYDROCHLORIDE AND ATROPINE SULFATE 2.5; .025 MG/1; MG/1
1 TABLET ORAL DAILY
Status: ON HOLD | COMMUNITY
End: 2021-10-07 | Stop reason: SDUPTHER

## 2021-09-21 RX ORDER — OMEPRAZOLE 20 MG/1
20 CAPSULE, DELAYED RELEASE ORAL DAILY
COMMUNITY
End: 2021-10-07 | Stop reason: HOSPADM

## 2021-09-21 RX ORDER — LISINOPRIL 20 MG/1
20 TABLET ORAL DAILY
COMMUNITY
End: 2021-10-07 | Stop reason: HOSPADM

## 2021-09-21 RX ADMIN — CEFEPIME HYDROCHLORIDE 2 G: 2 INJECTION, POWDER, FOR SOLUTION INTRAVENOUS at 12:07

## 2021-09-21 RX ADMIN — SODIUM CHLORIDE, PRESERVATIVE FREE 10 ML: 5 INJECTION INTRAVENOUS at 21:52

## 2021-09-21 RX ADMIN — ACETAMINOPHEN 650 MG: 650 SUPPOSITORY RECTAL at 05:28

## 2021-09-21 RX ADMIN — MAGNESIUM SULFATE HEPTAHYDRATE 2 G: 2 INJECTION, SOLUTION INTRAVENOUS at 21:53

## 2021-09-21 RX ADMIN — ENOXAPARIN SODIUM 30 MG: 30 INJECTION SUBCUTANEOUS at 12:07

## 2021-09-21 RX ADMIN — DEXTROSE MONOHYDRATE 25 G: 500 INJECTION PARENTERAL at 11:44

## 2021-09-21 RX ADMIN — PERFLUTREN 3 ML: 6.52 INJECTION, SUSPENSION INTRAVENOUS at 13:41

## 2021-09-21 RX ADMIN — FOSPHENYTOIN SODIUM 100 MG PE: 50 INJECTION, SOLUTION INTRAMUSCULAR; INTRAVENOUS at 23:04

## 2021-09-21 RX ADMIN — LEVETIRACETAM 500 MG: 500 INJECTION, SOLUTION INTRAVENOUS at 08:34

## 2021-09-21 RX ADMIN — POTASSIUM CHLORIDE 10 MEQ: 7.46 INJECTION, SOLUTION INTRAVENOUS at 13:57

## 2021-09-21 RX ADMIN — SODIUM CHLORIDE 125 ML/HR: 9 INJECTION, SOLUTION INTRAVENOUS at 05:28

## 2021-09-21 RX ADMIN — SODIUM CHLORIDE 1000 ML: 9 INJECTION, SOLUTION INTRAVENOUS at 12:24

## 2021-09-21 RX ADMIN — POTASSIUM CHLORIDE AND DEXTROSE MONOHYDRATE 125 ML/HR: 150; 5 INJECTION, SOLUTION INTRAVENOUS at 15:24

## 2021-09-21 RX ADMIN — MAGNESIUM SULFATE HEPTAHYDRATE 2 G: 40 INJECTION, SOLUTION INTRAVENOUS at 19:15

## 2021-09-21 RX ADMIN — MAGNESIUM SULFATE HEPTAHYDRATE 2 G: 40 INJECTION, SOLUTION INTRAVENOUS at 17:01

## 2021-09-21 RX ADMIN — LEVETIRACETAM 500 MG: 500 INJECTION, SOLUTION INTRAVENOUS at 21:47

## 2021-09-21 RX ADMIN — Medication 750 MG: at 15:24

## 2021-09-22 LAB
ANION GAP SERPL CALCULATED.3IONS-SCNC: 5.1 MMOL/L (ref 5–15)
ANION GAP SERPL CALCULATED.3IONS-SCNC: 6.8 MMOL/L (ref 5–15)
BUN SERPL-MCNC: 39 MG/DL (ref 8–23)
BUN SERPL-MCNC: 42 MG/DL (ref 8–23)
BUN/CREAT SERPL: 34.1 (ref 7–25)
BUN/CREAT SERPL: 34.5 (ref 7–25)
CALCIUM SPEC-SCNC: 8.7 MG/DL (ref 8.6–10.5)
CALCIUM SPEC-SCNC: 8.8 MG/DL (ref 8.6–10.5)
CHLORIDE SERPL-SCNC: 127 MMOL/L (ref 98–107)
CHLORIDE SERPL-SCNC: 129 MMOL/L (ref 98–107)
CO2 SERPL-SCNC: 13.2 MMOL/L (ref 22–29)
CO2 SERPL-SCNC: 13.9 MMOL/L (ref 22–29)
CREAT SERPL-MCNC: 1.13 MG/DL (ref 0.76–1.27)
CREAT SERPL-MCNC: 1.23 MG/DL (ref 0.76–1.27)
DEPRECATED RDW RBC AUTO: 42.1 FL (ref 37–54)
ERYTHROCYTE [DISTWIDTH] IN BLOOD BY AUTOMATED COUNT: 14 % (ref 12.3–15.4)
GFR SERPL CREATININE-BSD FRML MDRD: 60 ML/MIN/1.73
GFR SERPL CREATININE-BSD FRML MDRD: 66 ML/MIN/1.73
GFR SERPL CREATININE-BSD FRML MDRD: 73 ML/MIN/1.73
GFR SERPL CREATININE-BSD FRML MDRD: 80 ML/MIN/1.73
GLUCOSE BLDC GLUCOMTR-MCNC: 107 MG/DL (ref 70–130)
GLUCOSE BLDC GLUCOMTR-MCNC: 107 MG/DL (ref 70–130)
GLUCOSE BLDC GLUCOMTR-MCNC: 135 MG/DL (ref 70–130)
GLUCOSE BLDC GLUCOMTR-MCNC: 152 MG/DL (ref 70–130)
GLUCOSE SERPL-MCNC: 118 MG/DL (ref 65–99)
GLUCOSE SERPL-MCNC: 124 MG/DL (ref 65–99)
HCT VFR BLD AUTO: 26.4 % (ref 37.5–51)
HGB BLD-MCNC: 8.9 G/DL (ref 13–17.7)
INR PPP: 1.82 (ref 0.9–1.1)
MAGNESIUM SERPL-MCNC: 2.7 MG/DL (ref 1.6–2.4)
MCH RBC QN AUTO: 27.9 PG (ref 26.6–33)
MCHC RBC AUTO-ENTMCNC: 33.7 G/DL (ref 31.5–35.7)
MCV RBC AUTO: 82.8 FL (ref 79–97)
PLATELET # BLD AUTO: 223 10*3/MM3 (ref 140–450)
PMV BLD AUTO: 10.4 FL (ref 6–12)
POTASSIUM SERPL-SCNC: 3.6 MMOL/L (ref 3.5–5.2)
POTASSIUM SERPL-SCNC: 3.6 MMOL/L (ref 3.5–5.2)
PROTHROMBIN TIME: 20.8 SECONDS (ref 11.7–14.2)
RBC # BLD AUTO: 3.19 10*6/MM3 (ref 4.14–5.8)
SODIUM SERPL-SCNC: 146 MMOL/L (ref 136–145)
SODIUM SERPL-SCNC: 149 MMOL/L (ref 136–145)
VANCOMYCIN SERPL-MCNC: 15.9 MCG/ML (ref 5–40)
WBC # BLD AUTO: 10.19 10*3/MM3 (ref 3.4–10.8)

## 2021-09-22 PROCEDURE — 99232 SBSQ HOSP IP/OBS MODERATE 35: CPT | Performed by: NURSE PRACTITIONER

## 2021-09-22 PROCEDURE — 80048 BASIC METABOLIC PNL TOTAL CA: CPT | Performed by: HOSPITALIST

## 2021-09-22 PROCEDURE — 85610 PROTHROMBIN TIME: CPT | Performed by: HOSPITALIST

## 2021-09-22 PROCEDURE — 83735 ASSAY OF MAGNESIUM: CPT | Performed by: HOSPITALIST

## 2021-09-22 PROCEDURE — 25010000002 ENOXAPARIN PER 10 MG: Performed by: HOSPITALIST

## 2021-09-22 PROCEDURE — 25010000002 FOSPHENYTOIN 100 MG PE/2ML SOLUTION: Performed by: HOSPITALIST

## 2021-09-22 PROCEDURE — 25010000002 CEFEPIME PER 500 MG: Performed by: HOSPITALIST

## 2021-09-22 PROCEDURE — 85027 COMPLETE CBC AUTOMATED: CPT | Performed by: HOSPITALIST

## 2021-09-22 PROCEDURE — 25010000002 LEVETIRACETAM IN NACL 0.82% 500 MG/100ML SOLUTION: Performed by: HOSPITALIST

## 2021-09-22 PROCEDURE — 82962 GLUCOSE BLOOD TEST: CPT

## 2021-09-22 PROCEDURE — 25010000002 VANCOMYCIN PER 500 MG: Performed by: HOSPITALIST

## 2021-09-22 PROCEDURE — 25810000003 DEXTROSE 5 % WITH KCL 20 MEQ 20-5 MEQ/L-% SOLUTION: Performed by: HOSPITALIST

## 2021-09-22 PROCEDURE — 80202 ASSAY OF VANCOMYCIN: CPT | Performed by: HOSPITALIST

## 2021-09-22 RX ADMIN — BISACODYL 10 MG: 10 SUPPOSITORY RECTAL at 05:45

## 2021-09-22 RX ADMIN — LEVETIRACETAM 500 MG: 500 INJECTION, SOLUTION INTRAVENOUS at 21:09

## 2021-09-22 RX ADMIN — CEFEPIME 2 G: 2 INJECTION, POWDER, FOR SOLUTION INTRAVENOUS at 10:19

## 2021-09-22 RX ADMIN — SODIUM CHLORIDE 500 ML: 9 INJECTION, SOLUTION INTRAVENOUS at 09:02

## 2021-09-22 RX ADMIN — VANCOMYCIN HYDROCHLORIDE 500 MG: 500 INJECTION, POWDER, LYOPHILIZED, FOR SOLUTION INTRAVENOUS at 22:16

## 2021-09-22 RX ADMIN — SODIUM CHLORIDE, PRESERVATIVE FREE 10 ML: 5 INJECTION INTRAVENOUS at 10:19

## 2021-09-22 RX ADMIN — FOSPHENYTOIN SODIUM 100 MG PE: 50 INJECTION, SOLUTION INTRAMUSCULAR; INTRAVENOUS at 10:19

## 2021-09-22 RX ADMIN — POTASSIUM CHLORIDE AND DEXTROSE MONOHYDRATE 125 ML/HR: 150; 5 INJECTION, SOLUTION INTRAVENOUS at 00:48

## 2021-09-22 RX ADMIN — SODIUM CHLORIDE, PRESERVATIVE FREE 10 ML: 5 INJECTION INTRAVENOUS at 21:10

## 2021-09-22 RX ADMIN — ENOXAPARIN SODIUM 40 MG: 40 INJECTION SUBCUTANEOUS at 12:01

## 2021-09-22 RX ADMIN — FOSPHENYTOIN SODIUM 100 MG PE: 50 INJECTION, SOLUTION INTRAMUSCULAR; INTRAVENOUS at 21:09

## 2021-09-22 RX ADMIN — POTASSIUM CHLORIDE AND DEXTROSE MONOHYDRATE 125 ML/HR: 150; 5 INJECTION, SOLUTION INTRAVENOUS at 20:02

## 2021-09-22 RX ADMIN — POTASSIUM CHLORIDE AND DEXTROSE MONOHYDRATE 125 ML/HR: 150; 5 INJECTION, SOLUTION INTRAVENOUS at 09:55

## 2021-09-22 RX ADMIN — VANCOMYCIN HYDROCHLORIDE 500 MG: 500 INJECTION, POWDER, LYOPHILIZED, FOR SOLUTION INTRAVENOUS at 10:52

## 2021-09-22 RX ADMIN — LEVETIRACETAM 500 MG: 500 INJECTION, SOLUTION INTRAVENOUS at 10:52

## 2021-09-23 ENCOUNTER — APPOINTMENT (OUTPATIENT)
Dept: GENERAL RADIOLOGY | Facility: HOSPITAL | Age: 61
End: 2021-09-23

## 2021-09-23 ENCOUNTER — APPOINTMENT (OUTPATIENT)
Dept: INTERVENTIONAL RADIOLOGY/VASCULAR | Facility: HOSPITAL | Age: 61
End: 2021-09-23

## 2021-09-23 PROBLEM — E87.29 HIGH ANION GAP METABOLIC ACIDOSIS: Status: RESOLVED | Noted: 2021-09-19 | Resolved: 2021-09-23

## 2021-09-23 PROBLEM — E83.52 HYPERCALCEMIA: Status: RESOLVED | Noted: 2021-09-20 | Resolved: 2021-09-23

## 2021-09-23 PROBLEM — N17.9 AKI (ACUTE KIDNEY INJURY) (HCC): Status: RESOLVED | Noted: 2021-09-19 | Resolved: 2021-09-23

## 2021-09-23 LAB
ANION GAP SERPL CALCULATED.3IONS-SCNC: 3.3 MMOL/L (ref 5–15)
B-OH-BUTYR SERPL-SCNC: 0.05 MMOL/L (ref 0.02–0.27)
BACTERIA SPEC AEROBE CULT: ABNORMAL
BUN SERPL-MCNC: 27 MG/DL (ref 8–23)
BUN/CREAT SERPL: 34.6 (ref 7–25)
CALCIUM SPEC-SCNC: 8.7 MG/DL (ref 8.6–10.5)
CHLORIDE SERPL-SCNC: 126 MMOL/L (ref 98–107)
CHLORIDE UR-SCNC: 108 MMOL/L
CO2 SERPL-SCNC: 14.7 MMOL/L (ref 22–29)
CREAT SERPL-MCNC: 0.78 MG/DL (ref 0.76–1.27)
D-LACTATE SERPL-SCNC: 1.1 MMOL/L (ref 0.5–2)
DEPRECATED RDW RBC AUTO: 43.7 FL (ref 37–54)
ERYTHROCYTE [DISTWIDTH] IN BLOOD BY AUTOMATED COUNT: 14.6 % (ref 12.3–15.4)
GFR SERPL CREATININE-BSD FRML MDRD: 101 ML/MIN/1.73
GFR SERPL CREATININE-BSD FRML MDRD: 123 ML/MIN/1.73
GLUCOSE BLDC GLUCOMTR-MCNC: 104 MG/DL (ref 70–130)
GLUCOSE BLDC GLUCOMTR-MCNC: 75 MG/DL (ref 70–130)
GLUCOSE BLDC GLUCOMTR-MCNC: 80 MG/DL (ref 70–130)
GLUCOSE BLDC GLUCOMTR-MCNC: 86 MG/DL (ref 70–130)
GLUCOSE SERPL-MCNC: 102 MG/DL (ref 65–99)
GRAM STN SPEC: ABNORMAL
HCT VFR BLD AUTO: 26.7 % (ref 37.5–51)
HGB BLD-MCNC: 8.9 G/DL (ref 13–17.7)
INR PPP: 1.52 (ref 0.9–1.1)
INR PPP: 1.68 (ref 0.9–1.1)
ISOLATED FROM: ABNORMAL
MCH RBC QN AUTO: 27.6 PG (ref 26.6–33)
MCHC RBC AUTO-ENTMCNC: 33.3 G/DL (ref 31.5–35.7)
MCV RBC AUTO: 82.9 FL (ref 79–97)
PHENYTOIN FREE SERPL-MCNC: 1 UG/ML (ref 1–2)
PHENYTOIN SERPL-MCNC: 3.7 UG/ML (ref 10–20)
PLATELET # BLD AUTO: 201 10*3/MM3 (ref 140–450)
PMV BLD AUTO: 10 FL (ref 6–12)
POTASSIUM SERPL-SCNC: 4.1 MMOL/L (ref 3.5–5.2)
POTASSIUM UR-SCNC: 15.8 MMOL/L
PROTHROMBIN TIME: 18 SECONDS (ref 11.7–14.2)
PROTHROMBIN TIME: 19.6 SECONDS (ref 11.7–14.2)
RBC # BLD AUTO: 3.22 10*6/MM3 (ref 4.14–5.8)
SODIUM SERPL-SCNC: 144 MMOL/L (ref 136–145)
SODIUM UR-SCNC: 75 MMOL/L
VANCOMYCIN TROUGH SERPL-MCNC: 17.8 MCG/ML (ref 5–20)
WBC # BLD AUTO: 8.86 10*3/MM3 (ref 3.4–10.8)

## 2021-09-23 PROCEDURE — 25010000002 FOSPHENYTOIN 100 MG PE/2ML SOLUTION: Performed by: HOSPITALIST

## 2021-09-23 PROCEDURE — 87040 BLOOD CULTURE FOR BACTERIA: CPT | Performed by: HOSPITALIST

## 2021-09-23 PROCEDURE — 25010000002 LEVETIRACETAM IN NACL 0.82% 500 MG/100ML SOLUTION: Performed by: HOSPITALIST

## 2021-09-23 PROCEDURE — 83605 ASSAY OF LACTIC ACID: CPT | Performed by: INTERNAL MEDICINE

## 2021-09-23 PROCEDURE — 82436 ASSAY OF URINE CHLORIDE: CPT | Performed by: INTERNAL MEDICINE

## 2021-09-23 PROCEDURE — 84133 ASSAY OF URINE POTASSIUM: CPT | Performed by: INTERNAL MEDICINE

## 2021-09-23 PROCEDURE — 25810000003 DEXTROSE 5 % WITH KCL 20 MEQ 20-5 MEQ/L-% SOLUTION: Performed by: HOSPITALIST

## 2021-09-23 PROCEDURE — 25010000002 ENOXAPARIN PER 10 MG: Performed by: HOSPITALIST

## 2021-09-23 PROCEDURE — 85610 PROTHROMBIN TIME: CPT | Performed by: HOSPITALIST

## 2021-09-23 PROCEDURE — 74018 RADEX ABDOMEN 1 VIEW: CPT

## 2021-09-23 PROCEDURE — 84300 ASSAY OF URINE SODIUM: CPT | Performed by: INTERNAL MEDICINE

## 2021-09-23 PROCEDURE — 36597 REPOSITION VENOUS CATHETER: CPT

## 2021-09-23 PROCEDURE — 82962 GLUCOSE BLOOD TEST: CPT

## 2021-09-23 PROCEDURE — 80048 BASIC METABOLIC PNL TOTAL CA: CPT | Performed by: HOSPITALIST

## 2021-09-23 PROCEDURE — 82010 KETONE BODYS QUAN: CPT | Performed by: INTERNAL MEDICINE

## 2021-09-23 PROCEDURE — 99232 SBSQ HOSP IP/OBS MODERATE 35: CPT | Performed by: NURSE PRACTITIONER

## 2021-09-23 PROCEDURE — 25010000002 VANCOMYCIN PER 500 MG: Performed by: HOSPITALIST

## 2021-09-23 PROCEDURE — 85027 COMPLETE CBC AUTOMATED: CPT | Performed by: HOSPITALIST

## 2021-09-23 PROCEDURE — 80202 ASSAY OF VANCOMYCIN: CPT | Performed by: HOSPITALIST

## 2021-09-23 RX ORDER — MIDODRINE HYDROCHLORIDE 5 MG/1
5 TABLET ORAL
Status: DISCONTINUED | OUTPATIENT
Start: 2021-09-23 | End: 2021-09-24

## 2021-09-23 RX ORDER — SODIUM BICARBONATE 650 MG/1
1300 TABLET ORAL 3 TIMES DAILY
Status: DISCONTINUED | OUTPATIENT
Start: 2021-09-23 | End: 2021-10-07 | Stop reason: HOSPADM

## 2021-09-23 RX ORDER — DEXTROSE, SODIUM CHLORIDE, AND POTASSIUM CHLORIDE 5; .45; .15 G/100ML; G/100ML; G/100ML
75 INJECTION INTRAVENOUS CONTINUOUS
Status: DISCONTINUED | OUTPATIENT
Start: 2021-09-23 | End: 2021-09-23

## 2021-09-23 RX ADMIN — FOSPHENYTOIN SODIUM 100 MG PE: 50 INJECTION, SOLUTION INTRAMUSCULAR; INTRAVENOUS at 08:49

## 2021-09-23 RX ADMIN — APIXABAN 5 MG: 5 TABLET, FILM COATED ORAL at 21:14

## 2021-09-23 RX ADMIN — LEVETIRACETAM 500 MG: 500 INJECTION, SOLUTION INTRAVENOUS at 08:49

## 2021-09-23 RX ADMIN — SODIUM BICARBONATE 1300 MG: 650 TABLET ORAL at 17:27

## 2021-09-23 RX ADMIN — VANCOMYCIN HYDROCHLORIDE 500 MG: 500 INJECTION, POWDER, LYOPHILIZED, FOR SOLUTION INTRAVENOUS at 21:39

## 2021-09-23 RX ADMIN — SODIUM CHLORIDE, PRESERVATIVE FREE 10 ML: 5 INJECTION INTRAVENOUS at 21:15

## 2021-09-23 RX ADMIN — VANCOMYCIN HYDROCHLORIDE 500 MG: 500 INJECTION, POWDER, LYOPHILIZED, FOR SOLUTION INTRAVENOUS at 12:38

## 2021-09-23 RX ADMIN — POTASSIUM CHLORIDE AND DEXTROSE MONOHYDRATE 125 ML/HR: 150; 5 INJECTION, SOLUTION INTRAVENOUS at 05:23

## 2021-09-23 RX ADMIN — ENOXAPARIN SODIUM 40 MG: 40 INJECTION SUBCUTANEOUS at 12:38

## 2021-09-23 RX ADMIN — MIDODRINE HYDROCHLORIDE 5 MG: 5 TABLET ORAL at 21:14

## 2021-09-23 RX ADMIN — LEVETIRACETAM 500 MG: 500 INJECTION, SOLUTION INTRAVENOUS at 21:15

## 2021-09-23 RX ADMIN — SODIUM BICARBONATE 1300 MG: 650 TABLET ORAL at 21:14

## 2021-09-23 RX ADMIN — FOSPHENYTOIN SODIUM 100 MG PE: 50 INJECTION, SOLUTION INTRAMUSCULAR; INTRAVENOUS at 21:13

## 2021-09-23 RX ADMIN — SODIUM CHLORIDE, PRESERVATIVE FREE 10 ML: 5 INJECTION INTRAVENOUS at 08:49

## 2021-09-24 LAB
ALBUMIN SERPL-MCNC: 1.7 G/DL (ref 3.5–5.2)
ANION GAP SERPL CALCULATED.3IONS-SCNC: 4.9 MMOL/L (ref 5–15)
BACTERIA SPEC AEROBE CULT: NORMAL
BASOPHILS # BLD AUTO: 0.02 10*3/MM3 (ref 0–0.2)
BASOPHILS NFR BLD AUTO: 0.2 % (ref 0–1.5)
BUN SERPL-MCNC: 20 MG/DL (ref 8–23)
BUN/CREAT SERPL: 40 (ref 7–25)
CALCIUM SPEC-SCNC: 8.9 MG/DL (ref 8.6–10.5)
CHLORIDE SERPL-SCNC: 123 MMOL/L (ref 98–107)
CO2 SERPL-SCNC: 15.1 MMOL/L (ref 22–29)
CREAT SERPL-MCNC: 0.5 MG/DL (ref 0.76–1.27)
DEPRECATED RDW RBC AUTO: 42.4 FL (ref 37–54)
EOSINOPHIL # BLD AUTO: 0.07 10*3/MM3 (ref 0–0.4)
EOSINOPHIL NFR BLD AUTO: 0.8 % (ref 0.3–6.2)
ERYTHROCYTE [DISTWIDTH] IN BLOOD BY AUTOMATED COUNT: 14.8 % (ref 12.3–15.4)
GFR SERPL CREATININE-BSD FRML MDRD: >150 ML/MIN/1.73
GFR SERPL CREATININE-BSD FRML MDRD: >150 ML/MIN/1.73
GLUCOSE BLDC GLUCOMTR-MCNC: 132 MG/DL (ref 70–130)
GLUCOSE BLDC GLUCOMTR-MCNC: 141 MG/DL (ref 70–130)
GLUCOSE BLDC GLUCOMTR-MCNC: 62 MG/DL (ref 70–130)
GLUCOSE BLDC GLUCOMTR-MCNC: 64 MG/DL (ref 70–130)
GLUCOSE BLDC GLUCOMTR-MCNC: 81 MG/DL (ref 70–130)
GLUCOSE SERPL-MCNC: 67 MG/DL (ref 65–99)
HCT VFR BLD AUTO: 26.7 % (ref 37.5–51)
HGB BLD-MCNC: 9.1 G/DL (ref 13–17.7)
IMM GRANULOCYTES # BLD AUTO: 0.08 10*3/MM3 (ref 0–0.05)
IMM GRANULOCYTES NFR BLD AUTO: 1 % (ref 0–0.5)
INR PPP: 1.81 (ref 0.9–1.1)
LYMPHOCYTES # BLD AUTO: 1.49 10*3/MM3 (ref 0.7–3.1)
LYMPHOCYTES NFR BLD AUTO: 17.8 % (ref 19.6–45.3)
MAGNESIUM SERPL-MCNC: 1.9 MG/DL (ref 1.6–2.4)
MCH RBC QN AUTO: 27.7 PG (ref 26.6–33)
MCHC RBC AUTO-ENTMCNC: 34.1 G/DL (ref 31.5–35.7)
MCV RBC AUTO: 81.4 FL (ref 79–97)
MONOCYTES # BLD AUTO: 0.88 10*3/MM3 (ref 0.1–0.9)
MONOCYTES NFR BLD AUTO: 10.5 % (ref 5–12)
NEUTROPHILS NFR BLD AUTO: 5.81 10*3/MM3 (ref 1.7–7)
NEUTROPHILS NFR BLD AUTO: 69.7 % (ref 42.7–76)
NRBC BLD AUTO-RTO: 0.1 /100 WBC (ref 0–0.2)
PHOSPHATE SERPL-MCNC: 1.2 MG/DL (ref 2.5–4.5)
PLATELET # BLD AUTO: 237 10*3/MM3 (ref 140–450)
PMV BLD AUTO: 10.4 FL (ref 6–12)
POTASSIUM SERPL-SCNC: 4.2 MMOL/L (ref 3.5–5.2)
PROTHROMBIN TIME: 20.7 SECONDS (ref 11.7–14.2)
PTH-INTACT SERPL-MCNC: 70 PG/ML (ref 15–65)
RBC # BLD AUTO: 3.28 10*6/MM3 (ref 4.14–5.8)
SODIUM SERPL-SCNC: 143 MMOL/L (ref 136–145)
URATE SERPL-MCNC: 7 MG/DL (ref 3.4–7)
WBC # BLD AUTO: 8.35 10*3/MM3 (ref 3.4–10.8)

## 2021-09-24 PROCEDURE — 82962 GLUCOSE BLOOD TEST: CPT

## 2021-09-24 PROCEDURE — 85610 PROTHROMBIN TIME: CPT | Performed by: HOSPITALIST

## 2021-09-24 PROCEDURE — 25010000002 LEVETIRACETAM IN NACL 0.82% 500 MG/100ML SOLUTION: Performed by: HOSPITALIST

## 2021-09-24 PROCEDURE — 83970 ASSAY OF PARATHORMONE: CPT | Performed by: INTERNAL MEDICINE

## 2021-09-24 PROCEDURE — 84550 ASSAY OF BLOOD/URIC ACID: CPT | Performed by: INTERNAL MEDICINE

## 2021-09-24 PROCEDURE — 80202 ASSAY OF VANCOMYCIN: CPT | Performed by: HOSPITALIST

## 2021-09-24 PROCEDURE — 99222 1ST HOSP IP/OBS MODERATE 55: CPT | Performed by: PSYCHIATRY & NEUROLOGY

## 2021-09-24 PROCEDURE — 83735 ASSAY OF MAGNESIUM: CPT | Performed by: INTERNAL MEDICINE

## 2021-09-24 PROCEDURE — 25010000002 VANCOMYCIN PER 500 MG: Performed by: HOSPITALIST

## 2021-09-24 PROCEDURE — 25010000002 FOSPHENYTOIN 100 MG PE/2ML SOLUTION: Performed by: HOSPITALIST

## 2021-09-24 PROCEDURE — 80069 RENAL FUNCTION PANEL: CPT | Performed by: INTERNAL MEDICINE

## 2021-09-24 PROCEDURE — 85025 COMPLETE CBC W/AUTO DIFF WBC: CPT | Performed by: HOSPITALIST

## 2021-09-24 RX ORDER — FOSPHENYTOIN SODIUM 50 MG/ML
100 INJECTION, SOLUTION INTRAMUSCULAR; INTRAVENOUS ONCE
Status: COMPLETED | OUTPATIENT
Start: 2021-09-24 | End: 2021-09-24

## 2021-09-24 RX ORDER — PHENYTOIN 125 MG/5ML
100 SUSPENSION ORAL EVERY 8 HOURS SCHEDULED
Status: DISCONTINUED | OUTPATIENT
Start: 2021-09-24 | End: 2021-09-25 | Stop reason: ALTCHOICE

## 2021-09-24 RX ORDER — MIDODRINE HYDROCHLORIDE 5 MG/1
5 TABLET ORAL
Status: DISCONTINUED | OUTPATIENT
Start: 2021-09-24 | End: 2021-10-07 | Stop reason: HOSPADM

## 2021-09-24 RX ORDER — LEVETIRACETAM 100 MG/ML
500 SOLUTION ORAL EVERY 12 HOURS SCHEDULED
Status: DISCONTINUED | OUTPATIENT
Start: 2021-09-24 | End: 2021-09-24

## 2021-09-24 RX ORDER — PHENYTOIN 125 MG/5ML
100 SUSPENSION ORAL EVERY 12 HOURS SCHEDULED
Status: DISCONTINUED | OUTPATIENT
Start: 2021-09-24 | End: 2021-09-24

## 2021-09-24 RX ADMIN — MIDODRINE HYDROCHLORIDE 5 MG: 5 TABLET ORAL at 06:32

## 2021-09-24 RX ADMIN — LEVETIRACETAM 500 MG: 500 INJECTION, SOLUTION INTRAVENOUS at 08:04

## 2021-09-24 RX ADMIN — SODIUM CHLORIDE, PRESERVATIVE FREE 10 ML: 5 INJECTION INTRAVENOUS at 20:32

## 2021-09-24 RX ADMIN — VANCOMYCIN HYDROCHLORIDE 500 MG: 500 INJECTION, POWDER, LYOPHILIZED, FOR SOLUTION INTRAVENOUS at 09:45

## 2021-09-24 RX ADMIN — APIXABAN 5 MG: 5 TABLET, FILM COATED ORAL at 08:04

## 2021-09-24 RX ADMIN — SODIUM CHLORIDE, PRESERVATIVE FREE 10 ML: 5 INJECTION INTRAVENOUS at 08:04

## 2021-09-24 RX ADMIN — DEXTROSE MONOHYDRATE 25 G: 500 INJECTION PARENTERAL at 07:00

## 2021-09-24 RX ADMIN — SODIUM BICARBONATE 1300 MG: 650 TABLET ORAL at 08:04

## 2021-09-24 RX ADMIN — FOSPHENYTOIN SODIUM 100 MG PE: 50 INJECTION, SOLUTION INTRAMUSCULAR; INTRAVENOUS at 13:38

## 2021-09-24 RX ADMIN — Medication 1 PACKET: at 11:30

## 2021-09-24 RX ADMIN — DEXTROSE MONOHYDRATE 25 G: 500 INJECTION PARENTERAL at 21:15

## 2021-09-24 RX ADMIN — MIDODRINE HYDROCHLORIDE 5 MG: 5 TABLET ORAL at 11:30

## 2021-09-24 RX ADMIN — VANCOMYCIN HYDROCHLORIDE 500 MG: 500 INJECTION, POWDER, LYOPHILIZED, FOR SOLUTION INTRAVENOUS at 21:15

## 2021-09-25 ENCOUNTER — APPOINTMENT (OUTPATIENT)
Dept: CT IMAGING | Facility: HOSPITAL | Age: 61
End: 2021-09-25

## 2021-09-25 LAB
ALBUMIN SERPL-MCNC: 1.7 G/DL (ref 3.5–5.2)
ANION GAP SERPL CALCULATED.3IONS-SCNC: 5.2 MMOL/L (ref 5–15)
BASOPHILS # BLD AUTO: 0.02 10*3/MM3 (ref 0–0.2)
BASOPHILS NFR BLD AUTO: 0.3 % (ref 0–1.5)
BUN SERPL-MCNC: 14 MG/DL (ref 8–23)
BUN/CREAT SERPL: 31.8 (ref 7–25)
CALCIUM SPEC-SCNC: 8.8 MG/DL (ref 8.6–10.5)
CHLORIDE SERPL-SCNC: 124 MMOL/L (ref 98–107)
CO2 SERPL-SCNC: 16.8 MMOL/L (ref 22–29)
CREAT SERPL-MCNC: 0.44 MG/DL (ref 0.76–1.27)
DEPRECATED RDW RBC AUTO: 43.1 FL (ref 37–54)
EOSINOPHIL # BLD AUTO: 0.13 10*3/MM3 (ref 0–0.4)
EOSINOPHIL NFR BLD AUTO: 1.6 % (ref 0.3–6.2)
ERYTHROCYTE [DISTWIDTH] IN BLOOD BY AUTOMATED COUNT: 14.7 % (ref 12.3–15.4)
GFR SERPL CREATININE-BSD FRML MDRD: >150 ML/MIN/1.73
GFR SERPL CREATININE-BSD FRML MDRD: >150 ML/MIN/1.73
GLUCOSE BLDC GLUCOMTR-MCNC: 117 MG/DL (ref 70–130)
GLUCOSE BLDC GLUCOMTR-MCNC: 167 MG/DL (ref 70–130)
GLUCOSE BLDC GLUCOMTR-MCNC: 64 MG/DL (ref 70–130)
GLUCOSE BLDC GLUCOMTR-MCNC: 67 MG/DL (ref 70–130)
GLUCOSE BLDC GLUCOMTR-MCNC: 68 MG/DL (ref 70–130)
GLUCOSE BLDC GLUCOMTR-MCNC: 69 MG/DL (ref 70–130)
GLUCOSE BLDC GLUCOMTR-MCNC: 72 MG/DL (ref 70–130)
GLUCOSE SERPL-MCNC: 57 MG/DL (ref 65–99)
HCT VFR BLD AUTO: 23.8 % (ref 37.5–51)
HGB BLD-MCNC: 8.2 G/DL (ref 13–17.7)
IMM GRANULOCYTES # BLD AUTO: 0.08 10*3/MM3 (ref 0–0.05)
IMM GRANULOCYTES NFR BLD AUTO: 1 % (ref 0–0.5)
INR PPP: 1.4 (ref 0.9–1.1)
LYMPHOCYTES # BLD AUTO: 1.89 10*3/MM3 (ref 0.7–3.1)
LYMPHOCYTES NFR BLD AUTO: 24 % (ref 19.6–45.3)
MAGNESIUM SERPL-MCNC: 1.6 MG/DL (ref 1.6–2.4)
MCH RBC QN AUTO: 27.8 PG (ref 26.6–33)
MCHC RBC AUTO-ENTMCNC: 34.5 G/DL (ref 31.5–35.7)
MCV RBC AUTO: 80.7 FL (ref 79–97)
MONOCYTES # BLD AUTO: 0.84 10*3/MM3 (ref 0.1–0.9)
MONOCYTES NFR BLD AUTO: 10.7 % (ref 5–12)
NEUTROPHILS NFR BLD AUTO: 4.92 10*3/MM3 (ref 1.7–7)
NEUTROPHILS NFR BLD AUTO: 62.4 % (ref 42.7–76)
NRBC BLD AUTO-RTO: 0 /100 WBC (ref 0–0.2)
PHENYTOIN SERPL-MCNC: 5 MCG/ML (ref 10–20)
PHOSPHATE SERPL-MCNC: 1.3 MG/DL (ref 2.5–4.5)
PLATELET # BLD AUTO: 211 10*3/MM3 (ref 140–450)
PMV BLD AUTO: 10 FL (ref 6–12)
POTASSIUM SERPL-SCNC: 3.9 MMOL/L (ref 3.5–5.2)
PROTHROMBIN TIME: 17 SECONDS (ref 11.7–14.2)
RBC # BLD AUTO: 2.95 10*6/MM3 (ref 4.14–5.8)
SODIUM SERPL-SCNC: 146 MMOL/L (ref 136–145)
URATE SERPL-MCNC: 6.3 MG/DL (ref 3.4–7)
VANCOMYCIN SERPL-MCNC: 22.8 MCG/ML (ref 5–40)
WBC # BLD AUTO: 7.88 10*3/MM3 (ref 3.4–10.8)

## 2021-09-25 PROCEDURE — 82962 GLUCOSE BLOOD TEST: CPT

## 2021-09-25 PROCEDURE — 85610 PROTHROMBIN TIME: CPT | Performed by: HOSPITALIST

## 2021-09-25 PROCEDURE — 25010000002 FOSPHENYTOIN 100 MG PE/2ML SOLUTION: Performed by: HOSPITALIST

## 2021-09-25 PROCEDURE — 80185 ASSAY OF PHENYTOIN TOTAL: CPT | Performed by: HOSPITALIST

## 2021-09-25 PROCEDURE — 70450 CT HEAD/BRAIN W/O DYE: CPT

## 2021-09-25 PROCEDURE — 25010000002 VANCOMYCIN PER 500 MG: Performed by: HOSPITALIST

## 2021-09-25 PROCEDURE — 83735 ASSAY OF MAGNESIUM: CPT | Performed by: INTERNAL MEDICINE

## 2021-09-25 PROCEDURE — 80069 RENAL FUNCTION PANEL: CPT | Performed by: INTERNAL MEDICINE

## 2021-09-25 PROCEDURE — 85025 COMPLETE CBC W/AUTO DIFF WBC: CPT | Performed by: HOSPITALIST

## 2021-09-25 PROCEDURE — 84550 ASSAY OF BLOOD/URIC ACID: CPT | Performed by: INTERNAL MEDICINE

## 2021-09-25 PROCEDURE — 99231 SBSQ HOSP IP/OBS SF/LOW 25: CPT | Performed by: PSYCHIATRY & NEUROLOGY

## 2021-09-25 RX ORDER — FOSPHENYTOIN SODIUM 50 MG/ML
100 INJECTION, SOLUTION INTRAMUSCULAR; INTRAVENOUS EVERY 8 HOURS SCHEDULED
Status: DISCONTINUED | OUTPATIENT
Start: 2021-09-25 | End: 2021-10-06

## 2021-09-25 RX ORDER — SODIUM CHLORIDE 450 MG/100ML
75 INJECTION, SOLUTION INTRAVENOUS CONTINUOUS
Status: DISCONTINUED | OUTPATIENT
Start: 2021-09-25 | End: 2021-09-26

## 2021-09-25 RX ADMIN — FOSPHENYTOIN SODIUM 100 MG PE: 50 INJECTION, SOLUTION INTRAMUSCULAR; INTRAVENOUS at 15:32

## 2021-09-25 RX ADMIN — SODIUM BICARBONATE 1300 MG: 650 TABLET ORAL at 17:29

## 2021-09-25 RX ADMIN — APIXABAN 5 MG: 5 TABLET, FILM COATED ORAL at 13:34

## 2021-09-25 RX ADMIN — DEXTROSE MONOHYDRATE 25 G: 500 INJECTION PARENTERAL at 06:47

## 2021-09-25 RX ADMIN — SODIUM PHOSPHATE, MONOBASIC, MONOHYDRATE 20 MMOL: 276; 142 INJECTION, SOLUTION INTRAVENOUS at 13:34

## 2021-09-25 RX ADMIN — SODIUM CHLORIDE, PRESERVATIVE FREE 10 ML: 5 INJECTION INTRAVENOUS at 13:35

## 2021-09-25 RX ADMIN — FOSPHENYTOIN SODIUM 100 MG PE: 50 INJECTION, SOLUTION INTRAMUSCULAR; INTRAVENOUS at 21:52

## 2021-09-25 RX ADMIN — SODIUM CHLORIDE, PRESERVATIVE FREE 10 ML: 5 INJECTION INTRAVENOUS at 21:52

## 2021-09-25 RX ADMIN — VANCOMYCIN HYDROCHLORIDE 500 MG: 500 INJECTION, POWDER, LYOPHILIZED, FOR SOLUTION INTRAVENOUS at 13:34

## 2021-09-25 RX ADMIN — DEXTROSE MONOHYDRATE 25 G: 500 INJECTION PARENTERAL at 22:08

## 2021-09-25 RX ADMIN — APIXABAN 5 MG: 5 TABLET, FILM COATED ORAL at 21:52

## 2021-09-25 RX ADMIN — SODIUM BICARBONATE 1300 MG: 650 TABLET ORAL at 21:52

## 2021-09-25 RX ADMIN — SODIUM CHLORIDE 75 ML/HR: 4.5 INJECTION, SOLUTION INTRAVENOUS at 17:00

## 2021-09-25 RX ADMIN — MIDODRINE HYDROCHLORIDE 5 MG: 5 TABLET ORAL at 17:29

## 2021-09-26 LAB
25(OH)D3 SERPL-MCNC: 36.2 NG/ML (ref 30–100)
ALBUMIN SERPL-MCNC: 1.7 G/DL (ref 3.5–5.2)
ALBUMIN SERPL-MCNC: 1.7 G/DL (ref 3.5–5.2)
ANION GAP SERPL CALCULATED.3IONS-SCNC: 5.5 MMOL/L (ref 5–15)
ANION GAP SERPL CALCULATED.3IONS-SCNC: 5.7 MMOL/L (ref 5–15)
BUN SERPL-MCNC: 10 MG/DL (ref 8–23)
BUN SERPL-MCNC: 9 MG/DL (ref 8–23)
BUN/CREAT SERPL: 20.9 (ref 7–25)
BUN/CREAT SERPL: 23.8 (ref 7–25)
CALCIUM SPEC-SCNC: 8.7 MG/DL (ref 8.6–10.5)
CALCIUM SPEC-SCNC: 8.8 MG/DL (ref 8.6–10.5)
CHLORIDE SERPL-SCNC: 122 MMOL/L (ref 98–107)
CHLORIDE SERPL-SCNC: 122 MMOL/L (ref 98–107)
CO2 SERPL-SCNC: 17.3 MMOL/L (ref 22–29)
CO2 SERPL-SCNC: 18.5 MMOL/L (ref 22–29)
CREAT SERPL-MCNC: 0.42 MG/DL (ref 0.76–1.27)
CREAT SERPL-MCNC: 0.43 MG/DL (ref 0.76–1.27)
DEPRECATED RDW RBC AUTO: 47.7 FL (ref 37–54)
ERYTHROCYTE [DISTWIDTH] IN BLOOD BY AUTOMATED COUNT: 15.4 % (ref 12.3–15.4)
GFR SERPL CREATININE-BSD FRML MDRD: >150 ML/MIN/1.73
GLUCOSE BLDC GLUCOMTR-MCNC: 75 MG/DL (ref 70–130)
GLUCOSE BLDC GLUCOMTR-MCNC: 89 MG/DL (ref 70–130)
GLUCOSE BLDC GLUCOMTR-MCNC: 91 MG/DL (ref 70–130)
GLUCOSE BLDC GLUCOMTR-MCNC: 98 MG/DL (ref 70–130)
GLUCOSE SERPL-MCNC: 64 MG/DL (ref 65–99)
GLUCOSE SERPL-MCNC: 73 MG/DL (ref 65–99)
HCT VFR BLD AUTO: 26 % (ref 37.5–51)
HGB BLD-MCNC: 8.4 G/DL (ref 13–17.7)
INR PPP: 1.39 (ref 0.9–1.1)
MCH RBC QN AUTO: 27.6 PG (ref 26.6–33)
MCHC RBC AUTO-ENTMCNC: 32.3 G/DL (ref 31.5–35.7)
MCV RBC AUTO: 85.5 FL (ref 79–97)
PHOSPHATE SERPL-MCNC: 2.3 MG/DL (ref 2.5–4.5)
PHOSPHATE SERPL-MCNC: 2.6 MG/DL (ref 2.5–4.5)
PLATELET # BLD AUTO: 242 10*3/MM3 (ref 140–450)
PMV BLD AUTO: 10.1 FL (ref 6–12)
POTASSIUM SERPL-SCNC: 3.7 MMOL/L (ref 3.5–5.2)
POTASSIUM SERPL-SCNC: 4 MMOL/L (ref 3.5–5.2)
PROTHROMBIN TIME: 16.9 SECONDS (ref 11.7–14.2)
RBC # BLD AUTO: 3.04 10*6/MM3 (ref 4.14–5.8)
SODIUM SERPL-SCNC: 145 MMOL/L (ref 136–145)
SODIUM SERPL-SCNC: 146 MMOL/L (ref 136–145)
VANCOMYCIN SERPL-MCNC: 21.1 MCG/ML (ref 5–40)
WBC # BLD AUTO: 7.75 10*3/MM3 (ref 3.4–10.8)

## 2021-09-26 PROCEDURE — 25010000002 VANCOMYCIN PER 500 MG: Performed by: HOSPITALIST

## 2021-09-26 PROCEDURE — 82962 GLUCOSE BLOOD TEST: CPT

## 2021-09-26 PROCEDURE — 80202 ASSAY OF VANCOMYCIN: CPT | Performed by: HOSPITALIST

## 2021-09-26 PROCEDURE — 25010000002 FOSPHENYTOIN 100 MG PE/2ML SOLUTION: Performed by: HOSPITALIST

## 2021-09-26 PROCEDURE — 85610 PROTHROMBIN TIME: CPT | Performed by: HOSPITALIST

## 2021-09-26 PROCEDURE — 80069 RENAL FUNCTION PANEL: CPT | Performed by: HOSPITALIST

## 2021-09-26 PROCEDURE — 25010000002 VANCOMYCIN 750 MG RECONSTITUTED SOLUTION: Performed by: HOSPITALIST

## 2021-09-26 PROCEDURE — 85027 COMPLETE CBC AUTOMATED: CPT | Performed by: HOSPITALIST

## 2021-09-26 PROCEDURE — 82306 VITAMIN D 25 HYDROXY: CPT | Performed by: HOSPITALIST

## 2021-09-26 RX ORDER — DEXTROSE AND SODIUM CHLORIDE 5; .45 G/100ML; G/100ML
75 INJECTION, SOLUTION INTRAVENOUS CONTINUOUS
Status: DISCONTINUED | OUTPATIENT
Start: 2021-09-26 | End: 2021-09-27

## 2021-09-26 RX ADMIN — SODIUM CHLORIDE, PRESERVATIVE FREE 10 ML: 5 INJECTION INTRAVENOUS at 20:33

## 2021-09-26 RX ADMIN — DEXTROSE AND SODIUM CHLORIDE 75 ML/HR: 5; 450 INJECTION, SOLUTION INTRAVENOUS at 22:13

## 2021-09-26 RX ADMIN — APIXABAN 5 MG: 5 TABLET, FILM COATED ORAL at 15:15

## 2021-09-26 RX ADMIN — FOSPHENYTOIN SODIUM 100 MG PE: 50 INJECTION, SOLUTION INTRAMUSCULAR; INTRAVENOUS at 06:04

## 2021-09-26 RX ADMIN — FOSPHENYTOIN SODIUM 100 MG PE: 50 INJECTION, SOLUTION INTRAMUSCULAR; INTRAVENOUS at 22:16

## 2021-09-26 RX ADMIN — SODIUM CHLORIDE 750 MG: 900 INJECTION, SOLUTION INTRAVENOUS at 00:24

## 2021-09-26 RX ADMIN — SODIUM BICARBONATE 1300 MG: 650 TABLET ORAL at 22:15

## 2021-09-26 RX ADMIN — DEXTROSE AND SODIUM CHLORIDE 75 ML/HR: 5; 450 INJECTION, SOLUTION INTRAVENOUS at 09:00

## 2021-09-26 RX ADMIN — FOSPHENYTOIN SODIUM 100 MG PE: 50 INJECTION, SOLUTION INTRAMUSCULAR; INTRAVENOUS at 15:17

## 2021-09-26 RX ADMIN — SODIUM BICARBONATE 1300 MG: 650 TABLET ORAL at 15:15

## 2021-09-26 RX ADMIN — VANCOMYCIN HYDROCHLORIDE 500 MG: 500 INJECTION, POWDER, LYOPHILIZED, FOR SOLUTION INTRAVENOUS at 20:32

## 2021-09-26 RX ADMIN — SODIUM CHLORIDE, PRESERVATIVE FREE 10 ML: 5 INJECTION INTRAVENOUS at 09:26

## 2021-09-26 RX ADMIN — MIDODRINE HYDROCHLORIDE 5 MG: 5 TABLET ORAL at 15:14

## 2021-09-27 LAB
ALBUMIN SERPL-MCNC: 1.9 G/DL (ref 3.5–5.2)
CREAT SERPL-MCNC: 0.43 MG/DL (ref 0.76–1.27)
GFR SERPL CREATININE-BSD FRML MDRD: >150 ML/MIN/1.73
GFR SERPL CREATININE-BSD FRML MDRD: >150 ML/MIN/1.73
GLUCOSE BLDC GLUCOMTR-MCNC: 110 MG/DL (ref 70–130)
GLUCOSE BLDC GLUCOMTR-MCNC: 116 MG/DL (ref 70–130)
GLUCOSE BLDC GLUCOMTR-MCNC: 127 MG/DL (ref 70–130)
GLUCOSE BLDC GLUCOMTR-MCNC: 129 MG/DL (ref 70–130)
GLUCOSE BLDC GLUCOMTR-MCNC: 154 MG/DL (ref 70–130)
INR PPP: 1.31 (ref 0.9–1.1)
PHENYTOIN SERPL-MCNC: 8.8 MCG/ML (ref 10–20)
PROTHROMBIN TIME: 16 SECONDS (ref 11.7–14.2)
VANCOMYCIN SERPL-MCNC: 18.8 MCG/ML (ref 5–40)

## 2021-09-27 PROCEDURE — 92526 ORAL FUNCTION THERAPY: CPT

## 2021-09-27 PROCEDURE — 82565 ASSAY OF CREATININE: CPT | Performed by: HOSPITALIST

## 2021-09-27 PROCEDURE — 80185 ASSAY OF PHENYTOIN TOTAL: CPT | Performed by: HOSPITALIST

## 2021-09-27 PROCEDURE — 82962 GLUCOSE BLOOD TEST: CPT

## 2021-09-27 PROCEDURE — 25010000002 FOSPHENYTOIN 100 MG PE/2ML SOLUTION: Performed by: HOSPITALIST

## 2021-09-27 PROCEDURE — 85610 PROTHROMBIN TIME: CPT | Performed by: HOSPITALIST

## 2021-09-27 PROCEDURE — 63710000001 INSULIN LISPRO (HUMAN) PER 5 UNITS: Performed by: HOSPITALIST

## 2021-09-27 PROCEDURE — 82040 ASSAY OF SERUM ALBUMIN: CPT | Performed by: HOSPITALIST

## 2021-09-27 PROCEDURE — 80202 ASSAY OF VANCOMYCIN: CPT | Performed by: HOSPITALIST

## 2021-09-27 PROCEDURE — 25010000002 VANCOMYCIN PER 500 MG: Performed by: INTERNAL MEDICINE

## 2021-09-27 RX ORDER — SODIUM CHLORIDE 0.9 % (FLUSH) 0.9 %
10 SYRINGE (ML) INJECTION AS NEEDED
Status: DISCONTINUED | OUTPATIENT
Start: 2021-09-27 | End: 2021-10-07 | Stop reason: HOSPADM

## 2021-09-27 RX ORDER — SODIUM CHLORIDE 0.9 % (FLUSH) 0.9 %
10 SYRINGE (ML) INJECTION EVERY 12 HOURS SCHEDULED
Status: DISCONTINUED | OUTPATIENT
Start: 2021-09-27 | End: 2021-10-07 | Stop reason: HOSPADM

## 2021-09-27 RX ORDER — SODIUM CHLORIDE 0.9 % (FLUSH) 0.9 %
20 SYRINGE (ML) INJECTION AS NEEDED
Status: DISCONTINUED | OUTPATIENT
Start: 2021-09-27 | End: 2021-10-07 | Stop reason: HOSPADM

## 2021-09-27 RX ORDER — VANCOMYCIN HYDROCHLORIDE 1 G/200ML
1000 INJECTION, SOLUTION INTRAVENOUS EVERY 24 HOURS
Status: COMPLETED | OUTPATIENT
Start: 2021-09-27 | End: 2021-09-27

## 2021-09-27 RX ADMIN — APIXABAN 5 MG: 5 TABLET, FILM COATED ORAL at 03:59

## 2021-09-27 RX ADMIN — APIXABAN 5 MG: 5 TABLET, FILM COATED ORAL at 09:17

## 2021-09-27 RX ADMIN — SODIUM CHLORIDE, PRESERVATIVE FREE 10 ML: 5 INJECTION INTRAVENOUS at 09:23

## 2021-09-27 RX ADMIN — APIXABAN 5 MG: 5 TABLET, FILM COATED ORAL at 20:20

## 2021-09-27 RX ADMIN — MIDODRINE HYDROCHLORIDE 5 MG: 5 TABLET ORAL at 20:20

## 2021-09-27 RX ADMIN — FOSPHENYTOIN SODIUM 100 MG PE: 50 INJECTION, SOLUTION INTRAMUSCULAR; INTRAVENOUS at 06:29

## 2021-09-27 RX ADMIN — SODIUM CHLORIDE, PRESERVATIVE FREE 10 ML: 5 INJECTION INTRAVENOUS at 20:41

## 2021-09-27 RX ADMIN — SODIUM PHOSPHATE, MONOBASIC, MONOHYDRATE AND SODIUM PHOSPHATE, DIBASIC, ANHYDROUS 20 MMOL: 276; 142 INJECTION, SOLUTION INTRAVENOUS at 12:15

## 2021-09-27 RX ADMIN — VANCOMYCIN HYDROCHLORIDE 1000 MG: 1 INJECTION, SOLUTION INTRAVENOUS at 18:10

## 2021-09-27 RX ADMIN — INSULIN LISPRO 2 UNITS: 100 INJECTION, SOLUTION INTRAVENOUS; SUBCUTANEOUS at 09:23

## 2021-09-27 RX ADMIN — MIDODRINE HYDROCHLORIDE 5 MG: 5 TABLET ORAL at 13:15

## 2021-09-27 RX ADMIN — FOSPHENYTOIN SODIUM 100 MG PE: 50 INJECTION, SOLUTION INTRAMUSCULAR; INTRAVENOUS at 13:12

## 2021-09-27 RX ADMIN — SODIUM BICARBONATE 1300 MG: 650 TABLET ORAL at 22:20

## 2021-09-27 RX ADMIN — SODIUM BICARBONATE 1300 MG: 650 TABLET ORAL at 09:23

## 2021-09-27 RX ADMIN — SODIUM CHLORIDE, PRESERVATIVE FREE 10 ML: 5 INJECTION INTRAVENOUS at 12:15

## 2021-09-27 RX ADMIN — SODIUM BICARBONATE 1300 MG: 650 TABLET ORAL at 17:28

## 2021-09-27 RX ADMIN — MIDODRINE HYDROCHLORIDE 5 MG: 5 TABLET ORAL at 06:43

## 2021-09-27 RX ADMIN — SODIUM CHLORIDE, PRESERVATIVE FREE 10 ML: 5 INJECTION INTRAVENOUS at 20:21

## 2021-09-27 RX ADMIN — FOSPHENYTOIN SODIUM 100 MG PE: 50 INJECTION, SOLUTION INTRAMUSCULAR; INTRAVENOUS at 22:21

## 2021-09-28 ENCOUNTER — APPOINTMENT (OUTPATIENT)
Dept: NEUROLOGY | Facility: HOSPITAL | Age: 61
End: 2021-09-28

## 2021-09-28 ENCOUNTER — APPOINTMENT (OUTPATIENT)
Dept: GENERAL RADIOLOGY | Facility: HOSPITAL | Age: 61
End: 2021-09-28

## 2021-09-28 LAB
ALBUMIN SERPL-MCNC: 1.8 G/DL (ref 3.5–5.2)
ANION GAP SERPL CALCULATED.3IONS-SCNC: 5.6 MMOL/L (ref 5–15)
BACTERIA SPEC AEROBE CULT: NORMAL
BACTERIA SPEC AEROBE CULT: NORMAL
BASOPHILS # BLD AUTO: 0.04 10*3/MM3 (ref 0–0.2)
BASOPHILS NFR BLD AUTO: 0.5 % (ref 0–1.5)
BUN SERPL-MCNC: 11 MG/DL (ref 8–23)
BUN/CREAT SERPL: 32.4 (ref 7–25)
CALCIUM SPEC-SCNC: 8.2 MG/DL (ref 8.6–10.5)
CHLORIDE SERPL-SCNC: 115 MMOL/L (ref 98–107)
CO2 SERPL-SCNC: 21.4 MMOL/L (ref 22–29)
CREAT SERPL-MCNC: 0.34 MG/DL (ref 0.76–1.27)
DEPRECATED RDW RBC AUTO: 46.7 FL (ref 37–54)
EOSINOPHIL # BLD AUTO: 0.12 10*3/MM3 (ref 0–0.4)
EOSINOPHIL NFR BLD AUTO: 1.4 % (ref 0.3–6.2)
ERYTHROCYTE [DISTWIDTH] IN BLOOD BY AUTOMATED COUNT: 15.5 % (ref 12.3–15.4)
GFR SERPL CREATININE-BSD FRML MDRD: >150 ML/MIN/1.73
GFR SERPL CREATININE-BSD FRML MDRD: >150 ML/MIN/1.73
GLUCOSE BLDC GLUCOMTR-MCNC: 110 MG/DL (ref 70–130)
GLUCOSE BLDC GLUCOMTR-MCNC: 119 MG/DL (ref 70–130)
GLUCOSE BLDC GLUCOMTR-MCNC: 123 MG/DL (ref 70–130)
GLUCOSE BLDC GLUCOMTR-MCNC: 138 MG/DL (ref 70–130)
GLUCOSE BLDC GLUCOMTR-MCNC: 61 MG/DL (ref 70–130)
GLUCOSE SERPL-MCNC: 117 MG/DL (ref 65–99)
HCT VFR BLD AUTO: 24.7 % (ref 37.5–51)
HGB BLD-MCNC: 8.2 G/DL (ref 13–17.7)
IMM GRANULOCYTES # BLD AUTO: 0.07 10*3/MM3 (ref 0–0.05)
IMM GRANULOCYTES NFR BLD AUTO: 0.8 % (ref 0–0.5)
LYMPHOCYTES # BLD AUTO: 2.22 10*3/MM3 (ref 0.7–3.1)
LYMPHOCYTES NFR BLD AUTO: 26.5 % (ref 19.6–45.3)
MAGNESIUM SERPL-MCNC: 1.3 MG/DL (ref 1.6–2.4)
MCH RBC QN AUTO: 27.8 PG (ref 26.6–33)
MCHC RBC AUTO-ENTMCNC: 33.2 G/DL (ref 31.5–35.7)
MCV RBC AUTO: 83.7 FL (ref 79–97)
MONOCYTES # BLD AUTO: 1.11 10*3/MM3 (ref 0.1–0.9)
MONOCYTES NFR BLD AUTO: 13.2 % (ref 5–12)
NEUTROPHILS NFR BLD AUTO: 4.82 10*3/MM3 (ref 1.7–7)
NEUTROPHILS NFR BLD AUTO: 57.6 % (ref 42.7–76)
NRBC BLD AUTO-RTO: 0 /100 WBC (ref 0–0.2)
OSMOLALITY SERPL: 295 MOSM/KG (ref 280–301)
PHOSPHATE SERPL-MCNC: 2.4 MG/DL (ref 2.5–4.5)
PLATELET # BLD AUTO: 230 10*3/MM3 (ref 140–450)
PMV BLD AUTO: 9.8 FL (ref 6–12)
POTASSIUM SERPL-SCNC: 4.2 MMOL/L (ref 3.5–5.2)
RBC # BLD AUTO: 2.95 10*6/MM3 (ref 4.14–5.8)
SODIUM SERPL-SCNC: 142 MMOL/L (ref 136–145)
URATE SERPL-MCNC: 5 MG/DL (ref 3.4–7)
VANCOMYCIN TROUGH SERPL-MCNC: 18.4 MCG/ML (ref 5–20)
WBC # BLD AUTO: 8.38 10*3/MM3 (ref 3.4–10.8)

## 2021-09-28 PROCEDURE — 95816 EEG AWAKE AND DROWSY: CPT | Performed by: PSYCHIATRY & NEUROLOGY

## 2021-09-28 PROCEDURE — 25010000002 VANCOMYCIN PER 500 MG: Performed by: HOSPITALIST

## 2021-09-28 PROCEDURE — 82962 GLUCOSE BLOOD TEST: CPT

## 2021-09-28 PROCEDURE — 80202 ASSAY OF VANCOMYCIN: CPT | Performed by: INTERNAL MEDICINE

## 2021-09-28 PROCEDURE — 92611 MOTION FLUOROSCOPY/SWALLOW: CPT

## 2021-09-28 PROCEDURE — 25010000002 FOSPHENYTOIN 100 MG PE/2ML SOLUTION: Performed by: HOSPITALIST

## 2021-09-28 PROCEDURE — 80069 RENAL FUNCTION PANEL: CPT | Performed by: INTERNAL MEDICINE

## 2021-09-28 PROCEDURE — 84550 ASSAY OF BLOOD/URIC ACID: CPT | Performed by: INTERNAL MEDICINE

## 2021-09-28 PROCEDURE — 83930 ASSAY OF BLOOD OSMOLALITY: CPT | Performed by: HOSPITALIST

## 2021-09-28 PROCEDURE — 25010000003 MAGNESIUM SULFATE 4 GM/100ML SOLUTION: Performed by: HOSPITALIST

## 2021-09-28 PROCEDURE — 83735 ASSAY OF MAGNESIUM: CPT | Performed by: INTERNAL MEDICINE

## 2021-09-28 PROCEDURE — 85025 COMPLETE CBC W/AUTO DIFF WBC: CPT | Performed by: INTERNAL MEDICINE

## 2021-09-28 PROCEDURE — 74230 X-RAY XM SWLNG FUNCJ C+: CPT

## 2021-09-28 PROCEDURE — 95816 EEG AWAKE AND DROWSY: CPT

## 2021-09-28 RX ORDER — MAGNESIUM SULFATE HEPTAHYDRATE 40 MG/ML
4 INJECTION, SOLUTION INTRAVENOUS ONCE
Status: COMPLETED | OUTPATIENT
Start: 2021-09-28 | End: 2021-09-28

## 2021-09-28 RX ORDER — VANCOMYCIN HYDROCHLORIDE 1 G/200ML
1000 INJECTION, SOLUTION INTRAVENOUS EVERY 24 HOURS
Status: DISCONTINUED | OUTPATIENT
Start: 2021-09-28 | End: 2021-10-04

## 2021-09-28 RX ADMIN — SODIUM BICARBONATE 1300 MG: 650 TABLET ORAL at 10:46

## 2021-09-28 RX ADMIN — BARIUM SULFATE 50 ML: 400 SUSPENSION ORAL at 09:20

## 2021-09-28 RX ADMIN — SODIUM CHLORIDE, PRESERVATIVE FREE 10 ML: 5 INJECTION INTRAVENOUS at 20:22

## 2021-09-28 RX ADMIN — FOSPHENYTOIN SODIUM 100 MG PE: 50 INJECTION, SOLUTION INTRAMUSCULAR; INTRAVENOUS at 16:08

## 2021-09-28 RX ADMIN — APIXABAN 5 MG: 5 TABLET, FILM COATED ORAL at 21:35

## 2021-09-28 RX ADMIN — MIDODRINE HYDROCHLORIDE 5 MG: 5 TABLET ORAL at 10:47

## 2021-09-28 RX ADMIN — MIDODRINE HYDROCHLORIDE 5 MG: 5 TABLET ORAL at 17:00

## 2021-09-28 RX ADMIN — MAGNESIUM SULFATE HEPTAHYDRATE 4 G: 4 INJECTION, SOLUTION INTRAVENOUS at 21:38

## 2021-09-28 RX ADMIN — MIDODRINE HYDROCHLORIDE 5 MG: 5 TABLET ORAL at 16:07

## 2021-09-28 RX ADMIN — SODIUM BICARBONATE 1300 MG: 650 TABLET ORAL at 21:35

## 2021-09-28 RX ADMIN — SODIUM CHLORIDE, PRESERVATIVE FREE 10 ML: 5 INJECTION INTRAVENOUS at 10:48

## 2021-09-28 RX ADMIN — APIXABAN 5 MG: 5 TABLET, FILM COATED ORAL at 10:47

## 2021-09-28 RX ADMIN — SODIUM PHOSPHATE, MONOBASIC, MONOHYDRATE AND SODIUM PHOSPHATE, DIBASIC, ANHYDROUS 20 MMOL: 276; 142 INJECTION, SOLUTION INTRAVENOUS at 17:23

## 2021-09-28 RX ADMIN — SODIUM CHLORIDE, PRESERVATIVE FREE 10 ML: 5 INJECTION INTRAVENOUS at 10:47

## 2021-09-28 RX ADMIN — FOSPHENYTOIN SODIUM 100 MG PE: 50 INJECTION, SOLUTION INTRAMUSCULAR; INTRAVENOUS at 06:19

## 2021-09-28 RX ADMIN — VANCOMYCIN HYDROCHLORIDE 1000 MG: 1 INJECTION, SOLUTION INTRAVENOUS at 16:06

## 2021-09-28 RX ADMIN — SODIUM BICARBONATE 1300 MG: 650 TABLET ORAL at 16:07

## 2021-09-28 RX ADMIN — BARIUM SULFATE 135 ML: 980 POWDER, FOR SUSPENSION ORAL at 09:20

## 2021-09-28 RX ADMIN — BARIUM SULFATE 55 ML: 0.81 POWDER, FOR SUSPENSION ORAL at 09:20

## 2021-09-28 RX ADMIN — BISACODYL 10 MG: 10 SUPPOSITORY RECTAL at 17:14

## 2021-09-29 PROBLEM — R78.81 MRSA BACTEREMIA: Status: ACTIVE | Noted: 2021-09-29

## 2021-09-29 PROBLEM — B95.62 MRSA BACTEREMIA: Status: ACTIVE | Noted: 2021-09-29

## 2021-09-29 PROBLEM — J15.212 MRSA PNEUMONIA (HCC): Status: ACTIVE | Noted: 2021-09-29

## 2021-09-29 LAB
ALBUMIN SERPL-MCNC: 1.8 G/DL (ref 3.5–5.2)
ANION GAP SERPL CALCULATED.3IONS-SCNC: 3.5 MMOL/L (ref 5–15)
BUN SERPL-MCNC: 14 MG/DL (ref 8–23)
BUN/CREAT SERPL: 38.9 (ref 7–25)
CALCIUM SPEC-SCNC: 8.1 MG/DL (ref 8.6–10.5)
CHLORIDE SERPL-SCNC: 110 MMOL/L (ref 98–107)
CO2 SERPL-SCNC: 25.5 MMOL/L (ref 22–29)
CREAT SERPL-MCNC: 0.36 MG/DL (ref 0.76–1.27)
GFR SERPL CREATININE-BSD FRML MDRD: >150 ML/MIN/1.73
GFR SERPL CREATININE-BSD FRML MDRD: >150 ML/MIN/1.73
GLUCOSE BLDC GLUCOMTR-MCNC: 117 MG/DL (ref 70–130)
GLUCOSE BLDC GLUCOMTR-MCNC: 119 MG/DL (ref 70–130)
GLUCOSE BLDC GLUCOMTR-MCNC: 130 MG/DL (ref 70–130)
GLUCOSE BLDC GLUCOMTR-MCNC: 135 MG/DL (ref 70–130)
GLUCOSE BLDC GLUCOMTR-MCNC: 142 MG/DL (ref 70–130)
GLUCOSE SERPL-MCNC: 133 MG/DL (ref 65–99)
HCT VFR BLD AUTO: 23.4 % (ref 37.5–51)
HGB BLD-MCNC: 7.8 G/DL (ref 13–17.7)
MAGNESIUM SERPL-MCNC: 1.7 MG/DL (ref 1.6–2.4)
PHOSPHATE SERPL-MCNC: 1.8 MG/DL (ref 2.5–4.5)
POTASSIUM SERPL-SCNC: 4 MMOL/L (ref 3.5–5.2)
SODIUM SERPL-SCNC: 139 MMOL/L (ref 136–145)

## 2021-09-29 PROCEDURE — 92526 ORAL FUNCTION THERAPY: CPT

## 2021-09-29 PROCEDURE — 82962 GLUCOSE BLOOD TEST: CPT

## 2021-09-29 PROCEDURE — 83735 ASSAY OF MAGNESIUM: CPT | Performed by: HOSPITALIST

## 2021-09-29 PROCEDURE — 85018 HEMOGLOBIN: CPT | Performed by: HOSPITALIST

## 2021-09-29 PROCEDURE — 25010000002 FOSPHENYTOIN 100 MG PE/2ML SOLUTION: Performed by: HOSPITALIST

## 2021-09-29 PROCEDURE — 85014 HEMATOCRIT: CPT | Performed by: HOSPITALIST

## 2021-09-29 PROCEDURE — 25010000002 VANCOMYCIN PER 500 MG: Performed by: HOSPITALIST

## 2021-09-29 PROCEDURE — 80069 RENAL FUNCTION PANEL: CPT | Performed by: HOSPITALIST

## 2021-09-29 RX ADMIN — FOSPHENYTOIN SODIUM 100 MG PE: 50 INJECTION, SOLUTION INTRAMUSCULAR; INTRAVENOUS at 09:41

## 2021-09-29 RX ADMIN — MIDODRINE HYDROCHLORIDE 5 MG: 5 TABLET ORAL at 09:41

## 2021-09-29 RX ADMIN — VANCOMYCIN HYDROCHLORIDE 1000 MG: 1 INJECTION, SOLUTION INTRAVENOUS at 09:41

## 2021-09-29 RX ADMIN — SODIUM CHLORIDE, PRESERVATIVE FREE 10 ML: 5 INJECTION INTRAVENOUS at 20:53

## 2021-09-29 RX ADMIN — FOSPHENYTOIN SODIUM 100 MG PE: 50 INJECTION, SOLUTION INTRAMUSCULAR; INTRAVENOUS at 17:57

## 2021-09-29 RX ADMIN — SODIUM CHLORIDE, PRESERVATIVE FREE 10 ML: 5 INJECTION INTRAVENOUS at 09:41

## 2021-09-29 RX ADMIN — MIDODRINE HYDROCHLORIDE 5 MG: 5 TABLET ORAL at 17:57

## 2021-09-29 RX ADMIN — SODIUM BICARBONATE 1300 MG: 650 TABLET ORAL at 09:40

## 2021-09-29 RX ADMIN — FOSPHENYTOIN SODIUM 100 MG PE: 50 INJECTION, SOLUTION INTRAMUSCULAR; INTRAVENOUS at 23:22

## 2021-09-29 RX ADMIN — SODIUM CHLORIDE, PRESERVATIVE FREE 10 ML: 5 INJECTION INTRAVENOUS at 09:42

## 2021-09-29 RX ADMIN — SODIUM CHLORIDE, PRESERVATIVE FREE 10 ML: 5 INJECTION INTRAVENOUS at 20:57

## 2021-09-29 RX ADMIN — SODIUM CHLORIDE, PRESERVATIVE FREE 10 ML: 5 INJECTION INTRAVENOUS at 20:52

## 2021-09-29 RX ADMIN — APIXABAN 5 MG: 5 TABLET, FILM COATED ORAL at 09:40

## 2021-09-29 RX ADMIN — SODIUM BICARBONATE 1300 MG: 650 TABLET ORAL at 20:51

## 2021-09-29 RX ADMIN — SODIUM BICARBONATE 1300 MG: 650 TABLET ORAL at 17:57

## 2021-09-29 RX ADMIN — FOSPHENYTOIN SODIUM 100 MG PE: 50 INJECTION, SOLUTION INTRAMUSCULAR; INTRAVENOUS at 00:47

## 2021-09-30 ENCOUNTER — INPATIENT HOSPITAL (OUTPATIENT)
Dept: URBAN - METROPOLITAN AREA HOSPITAL 113 | Facility: HOSPITAL | Age: 61
End: 2021-09-30
Payer: MEDICARE

## 2021-09-30 ENCOUNTER — APPOINTMENT (OUTPATIENT)
Dept: GENERAL RADIOLOGY | Facility: HOSPITAL | Age: 61
End: 2021-09-30

## 2021-09-30 DIAGNOSIS — E11.9 TYPE 2 DIABETES MELLITUS WITHOUT COMPLICATIONS: ICD-10-CM

## 2021-09-30 DIAGNOSIS — R79.89 OTHER SPECIFIED ABNORMAL FINDINGS OF BLOOD CHEMISTRY: ICD-10-CM

## 2021-09-30 DIAGNOSIS — E87.1 HYPO-OSMOLALITY AND HYPONATREMIA: ICD-10-CM

## 2021-09-30 DIAGNOSIS — B95.62 METHICILLIN RESISTANT STAPHYLOCOCCUS AUREUS INFECTION AS THE: ICD-10-CM

## 2021-09-30 DIAGNOSIS — J15.212 PNEUMONIA DUE TO METHICILLIN RESISTANT STAPHYLOCOCCUS AUREUS: ICD-10-CM

## 2021-09-30 LAB
ANION GAP SERPL CALCULATED.3IONS-SCNC: 3.9 MMOL/L (ref 5–15)
BASOPHILS # BLD AUTO: 0.03 10*3/MM3 (ref 0–0.2)
BASOPHILS NFR BLD AUTO: 0.3 % (ref 0–1.5)
BUN SERPL-MCNC: 15 MG/DL (ref 8–23)
BUN/CREAT SERPL: 45.5 (ref 7–25)
CALCIUM SPEC-SCNC: 8.3 MG/DL (ref 8.6–10.5)
CHLORIDE SERPL-SCNC: 109 MMOL/L (ref 98–107)
CO2 SERPL-SCNC: 27.1 MMOL/L (ref 22–29)
CREAT SERPL-MCNC: 0.33 MG/DL (ref 0.76–1.27)
DEPRECATED RDW RBC AUTO: 45.3 FL (ref 37–54)
EOSINOPHIL # BLD AUTO: 0.19 10*3/MM3 (ref 0–0.4)
EOSINOPHIL NFR BLD AUTO: 2.1 % (ref 0.3–6.2)
ERYTHROCYTE [DISTWIDTH] IN BLOOD BY AUTOMATED COUNT: 15.3 % (ref 12.3–15.4)
GFR SERPL CREATININE-BSD FRML MDRD: >150 ML/MIN/1.73
GFR SERPL CREATININE-BSD FRML MDRD: >150 ML/MIN/1.73
GLUCOSE BLDC GLUCOMTR-MCNC: 100 MG/DL (ref 70–130)
GLUCOSE BLDC GLUCOMTR-MCNC: 103 MG/DL (ref 70–130)
GLUCOSE BLDC GLUCOMTR-MCNC: 110 MG/DL (ref 70–130)
GLUCOSE BLDC GLUCOMTR-MCNC: 87 MG/DL (ref 70–130)
GLUCOSE SERPL-MCNC: 90 MG/DL (ref 65–99)
HCT VFR BLD AUTO: 23.5 % (ref 37.5–51)
HGB BLD-MCNC: 7.9 G/DL (ref 13–17.7)
IMM GRANULOCYTES # BLD AUTO: 0.04 10*3/MM3 (ref 0–0.05)
IMM GRANULOCYTES NFR BLD AUTO: 0.4 % (ref 0–0.5)
LYMPHOCYTES # BLD AUTO: 2.22 10*3/MM3 (ref 0.7–3.1)
LYMPHOCYTES NFR BLD AUTO: 24.7 % (ref 19.6–45.3)
MAGNESIUM SERPL-MCNC: 1.6 MG/DL (ref 1.6–2.4)
MCH RBC QN AUTO: 27.8 PG (ref 26.6–33)
MCHC RBC AUTO-ENTMCNC: 33.6 G/DL (ref 31.5–35.7)
MCV RBC AUTO: 82.7 FL (ref 79–97)
MONOCYTES # BLD AUTO: 1.01 10*3/MM3 (ref 0.1–0.9)
MONOCYTES NFR BLD AUTO: 11.2 % (ref 5–12)
NEUTROPHILS NFR BLD AUTO: 5.5 10*3/MM3 (ref 1.7–7)
NEUTROPHILS NFR BLD AUTO: 61.3 % (ref 42.7–76)
NRBC BLD AUTO-RTO: 0.1 /100 WBC (ref 0–0.2)
PHOSPHATE SERPL-MCNC: 2.4 MG/DL (ref 2.5–4.5)
PLATELET # BLD AUTO: 225 10*3/MM3 (ref 140–450)
PMV BLD AUTO: 9.7 FL (ref 6–12)
POTASSIUM SERPL-SCNC: 4.5 MMOL/L (ref 3.5–5.2)
RBC # BLD AUTO: 2.84 10*6/MM3 (ref 4.14–5.8)
SODIUM SERPL-SCNC: 140 MMOL/L (ref 136–145)
VANCOMYCIN TROUGH SERPL-MCNC: 18.5 MCG/ML (ref 5–20)
WBC # BLD AUTO: 8.99 10*3/MM3 (ref 3.4–10.8)

## 2021-09-30 PROCEDURE — 84100 ASSAY OF PHOSPHORUS: CPT | Performed by: STUDENT IN AN ORGANIZED HEALTH CARE EDUCATION/TRAINING PROGRAM

## 2021-09-30 PROCEDURE — 82962 GLUCOSE BLOOD TEST: CPT

## 2021-09-30 PROCEDURE — 99222 1ST HOSP IP/OBS MODERATE 55: CPT | Performed by: INTERNAL MEDICINE

## 2021-09-30 PROCEDURE — 80202 ASSAY OF VANCOMYCIN: CPT | Performed by: HOSPITALIST

## 2021-09-30 PROCEDURE — 25010000002 VANCOMYCIN PER 500 MG: Performed by: HOSPITALIST

## 2021-09-30 PROCEDURE — 83735 ASSAY OF MAGNESIUM: CPT | Performed by: STUDENT IN AN ORGANIZED HEALTH CARE EDUCATION/TRAINING PROGRAM

## 2021-09-30 PROCEDURE — 85025 COMPLETE CBC W/AUTO DIFF WBC: CPT | Performed by: STUDENT IN AN ORGANIZED HEALTH CARE EDUCATION/TRAINING PROGRAM

## 2021-09-30 PROCEDURE — 99221 1ST HOSP IP/OBS SF/LOW 40: CPT | Performed by: INTERNAL MEDICINE

## 2021-09-30 PROCEDURE — 25010000002 FOSPHENYTOIN 100 MG PE/2ML SOLUTION: Performed by: HOSPITALIST

## 2021-09-30 PROCEDURE — 80048 BASIC METABOLIC PNL TOTAL CA: CPT | Performed by: STUDENT IN AN ORGANIZED HEALTH CARE EDUCATION/TRAINING PROGRAM

## 2021-09-30 RX ADMIN — MIDODRINE HYDROCHLORIDE 5 MG: 5 TABLET ORAL at 17:44

## 2021-09-30 RX ADMIN — VANCOMYCIN HYDROCHLORIDE 1000 MG: 1 INJECTION, SOLUTION INTRAVENOUS at 11:24

## 2021-09-30 RX ADMIN — MIDODRINE HYDROCHLORIDE 5 MG: 5 TABLET ORAL at 06:30

## 2021-09-30 RX ADMIN — FOSPHENYTOIN SODIUM 100 MG PE: 50 INJECTION, SOLUTION INTRAMUSCULAR; INTRAVENOUS at 08:49

## 2021-09-30 RX ADMIN — SODIUM CHLORIDE, PRESERVATIVE FREE 10 ML: 5 INJECTION INTRAVENOUS at 08:50

## 2021-09-30 RX ADMIN — POTASSIUM PHOSPHATE, MONOBASIC AND POTASSIUM PHOSPHATE, DIBASIC 15 MMOL: 224; 236 INJECTION, SOLUTION, CONCENTRATE INTRAVENOUS at 01:02

## 2021-09-30 RX ADMIN — SODIUM CHLORIDE, PRESERVATIVE FREE 10 ML: 5 INJECTION INTRAVENOUS at 08:49

## 2021-09-30 RX ADMIN — SODIUM BICARBONATE 1300 MG: 650 TABLET ORAL at 17:44

## 2021-09-30 RX ADMIN — SODIUM BICARBONATE 1300 MG: 650 TABLET ORAL at 08:49

## 2021-10-01 ENCOUNTER — INPATIENT HOSPITAL (OUTPATIENT)
Dept: URBAN - METROPOLITAN AREA HOSPITAL 113 | Facility: HOSPITAL | Age: 61
End: 2021-10-01
Payer: MEDICAID

## 2021-10-01 ENCOUNTER — APPOINTMENT (OUTPATIENT)
Dept: INTERVENTIONAL RADIOLOGY/VASCULAR | Facility: HOSPITAL | Age: 61
End: 2021-10-01

## 2021-10-01 DIAGNOSIS — B95.62 METHICILLIN RESISTANT STAPHYLOCOCCUS AUREUS INFECTION AS THE: ICD-10-CM

## 2021-10-01 DIAGNOSIS — E87.1 HYPO-OSMOLALITY AND HYPONATREMIA: ICD-10-CM

## 2021-10-01 DIAGNOSIS — R13.12 DYSPHAGIA, OROPHARYNGEAL PHASE: ICD-10-CM

## 2021-10-01 DIAGNOSIS — E11.9 TYPE 2 DIABETES MELLITUS WITHOUT COMPLICATIONS: ICD-10-CM

## 2021-10-01 DIAGNOSIS — I50.33 ACUTE ON CHRONIC DIASTOLIC (CONGESTIVE) HEART FAILURE: ICD-10-CM

## 2021-10-01 DIAGNOSIS — R56.9 UNSPECIFIED CONVULSIONS: ICD-10-CM

## 2021-10-01 DIAGNOSIS — R79.89 OTHER SPECIFIED ABNORMAL FINDINGS OF BLOOD CHEMISTRY: ICD-10-CM

## 2021-10-01 DIAGNOSIS — G93.41 METABOLIC ENCEPHALOPATHY: ICD-10-CM

## 2021-10-01 DIAGNOSIS — J15.212 PNEUMONIA DUE TO METHICILLIN RESISTANT STAPHYLOCOCCUS AUREUS: ICD-10-CM

## 2021-10-01 LAB
GLUCOSE BLDC GLUCOMTR-MCNC: 111 MG/DL (ref 70–130)
GLUCOSE BLDC GLUCOMTR-MCNC: 71 MG/DL (ref 70–130)
GLUCOSE BLDC GLUCOMTR-MCNC: 73 MG/DL (ref 70–130)

## 2021-10-01 PROCEDURE — 82962 GLUCOSE BLOOD TEST: CPT

## 2021-10-01 PROCEDURE — 25010000002 VANCOMYCIN PER 500 MG: Performed by: INTERNAL MEDICINE

## 2021-10-01 PROCEDURE — 99232 SBSQ HOSP IP/OBS MODERATE 35: CPT | Performed by: INTERNAL MEDICINE

## 2021-10-01 PROCEDURE — C1769 GUIDE WIRE: HCPCS

## 2021-10-01 PROCEDURE — 25010000002 FOSPHENYTOIN 100 MG PE/2ML SOLUTION: Performed by: HOSPITALIST

## 2021-10-01 PROCEDURE — 36597 REPOSITION VENOUS CATHETER: CPT

## 2021-10-01 RX ORDER — CEFAZOLIN SODIUM 1 G/50ML
1 INJECTION, SOLUTION INTRAVENOUS ONCE
Status: COMPLETED | OUTPATIENT
Start: 2021-10-03 | End: 2021-10-05

## 2021-10-01 RX ORDER — CEFAZOLIN SODIUM 1 G/50ML
1 INJECTION, SOLUTION INTRAVENOUS ONCE
Status: DISCONTINUED | OUTPATIENT
Start: 2021-10-01 | End: 2021-10-03

## 2021-10-01 RX ADMIN — SODIUM BICARBONATE 1300 MG: 650 TABLET ORAL at 21:29

## 2021-10-01 RX ADMIN — FOSPHENYTOIN SODIUM 100 MG PE: 50 INJECTION, SOLUTION INTRAMUSCULAR; INTRAVENOUS at 23:53

## 2021-10-01 RX ADMIN — VANCOMYCIN HYDROCHLORIDE 1000 MG: 1 INJECTION, SOLUTION INTRAVENOUS at 13:39

## 2021-10-01 RX ADMIN — FOSPHENYTOIN SODIUM 100 MG PE: 50 INJECTION, SOLUTION INTRAMUSCULAR; INTRAVENOUS at 17:11

## 2021-10-01 RX ADMIN — SODIUM CHLORIDE, PRESERVATIVE FREE 10 ML: 5 INJECTION INTRAVENOUS at 21:29

## 2021-10-01 RX ADMIN — MIDODRINE HYDROCHLORIDE 5 MG: 5 TABLET ORAL at 17:11

## 2021-10-01 RX ADMIN — SODIUM CHLORIDE, PRESERVATIVE FREE 10 ML: 5 INJECTION INTRAVENOUS at 09:07

## 2021-10-01 RX ADMIN — SODIUM BICARBONATE 1300 MG: 650 TABLET ORAL at 17:11

## 2021-10-01 RX ADMIN — FOSPHENYTOIN SODIUM 100 MG PE: 50 INJECTION, SOLUTION INTRAMUSCULAR; INTRAVENOUS at 09:05

## 2021-10-01 NOTE — NURSING NOTE
IV therapy was asked to place a peripheral IV  Due to the PICC line  Needing to be re positioned. I attempted x 3 while using the ultrasound, while I was able to access all 3 veins the catheter would not thread. I also used a guidewire to see if I could slide the catheter over it and was unable to do so. The guide wire when used at the antecubital came out in the shape of a [id number].

## 2021-10-01 NOTE — PLAN OF CARE
Problem: Adult Inpatient Plan of Care  Goal: Plan of Care Review  10/1/2021 1738 by Seven Bejarano RN  Outcome: Ongoing, Progressing  Flowsheets  Taken 10/1/2021 1738 by Seven Bejarano RN  Progress: improving  Outcome Summary: Restraints still necessary due to pulling, cortrak replaced, PICC fixed in IR, and tube feeding restarted.  Turn every two.  Large BM today.  Wounds addressed.  Taken 10/1/2021 0440 by Ilene Amor RN  Plan of Care Reviewed With: patient  10/1/2021 1504 by Seven Bejarano RN  Outcome: Ongoing, Progressing  Flowsheets  Taken 10/1/2021 1504 by Seven Bejarano RN  Progress: no change  Outcome Summary: Down stairs for IR PICC line adjustment, Radiologist okay with one consent form.  PEG tube in future once family calls back and gives verbal concent for placement otherwise cortrak in place.  Large BM today.  Tube feed restarted.  Taken 10/1/2021 0440 by Ilene Amor RN  Plan of Care Reviewed With: patient  Goal: Patient-Specific Goal (Individualized)  10/1/2021 1738 by Seven Bejarano RN  Outcome: Ongoing, Progressing  10/1/2021 1504 by Seven Bejarano RN  Outcome: Ongoing, Progressing  Goal: Absence of Hospital-Acquired Illness or Injury  10/1/2021 1738 by Seven Bejarano RN  Outcome: Ongoing, Progressing  10/1/2021 1504 by Seven Bejarano RN  Outcome: Ongoing, Progressing  Intervention: Identify and Manage Fall Risk  Recent Flowsheet Documentation  Taken 10/1/2021 1716 by Seven Bejarano RN  Safety Promotion/Fall Prevention: safety round/check completed  Taken 10/1/2021 1638 by Seven Bejarano RN  Safety Promotion/Fall Prevention:   muscle strengthening facilitated   nonskid shoes/slippers when out of bed   room organization consistent   safety round/check completed   toileting scheduled  Taken 10/1/2021 1439 by Seven Bejarano RN  Safety Promotion/Fall Prevention:   fall prevention program maintained   clutter free environment maintained   activity supervised   muscle strengthening facilitated    nonskid shoes/slippers when out of bed   room organization consistent   safety round/check completed   toileting scheduled  Taken 10/1/2021 1300 by Seven Bejarano RN  Safety Promotion/Fall Prevention: safety round/check completed  Taken 10/1/2021 1111 by Seven Bejarano RN  Safety Promotion/Fall Prevention:   room organization consistent   safety round/check completed   toileting scheduled  Taken 10/1/2021 0907 by Seven Bejarano RN  Safety Promotion/Fall Prevention:   nonskid shoes/slippers when out of bed   muscle strengthening facilitated   room organization consistent   safety round/check completed   toileting scheduled  Taken 10/1/2021 0727 by Seven Bejarano RN  Safety Promotion/Fall Prevention: safety round/check completed  Intervention: Prevent Skin Injury  Recent Flowsheet Documentation  Taken 10/1/2021 1716 by Seven Bejarano RN  Body Position: tilted, left  Taken 10/1/2021 1638 by Seven Bejarano RN  Body Position:   tilted, left   weight shift assistance provided   legs elevated   log-rolled   turned  Taken 10/1/2021 1439 by Seven Bejarano RN  Body Position:   turned   legs elevated   log-rolled   tilted, left   weight shift assistance provided  Taken 10/1/2021 1300 by Seven Bejarano RN  Body Position:   tilted, right   weight shift assistance provided   legs elevated   log-rolled   turned  Taken 10/1/2021 1111 by Seven Bejarano RN  Body Position:   supine   weight shift assistance provided   turned  Taken 10/1/2021 0907 by Seven Bejarano RN  Body Position:   tilted, left   legs elevated   log-rolled   turned   weight shift assistance provided  Skin Protection: incontinence pads utilized  Taken 10/1/2021 0727 by Seven Bejarano RN  Body Position:   tilted, left   turned   weight shift assistance provided  Intervention: Prevent and Manage VTE (venous thromboembolism) Risk  Recent Flowsheet Documentation  Taken 10/1/2021 0907 by Seven Bejarano RN  VTE Prevention/Management: (dvt proph )   bilateral    dorsiflexion/plantar flexion performed   bleeding risk factor(s) identified   other (see comments)  Intervention: Prevent Infection  Recent Flowsheet Documentation  Taken 10/1/2021 1638 by Seven Bejarano RN  Infection Prevention:   cohorting utilized   equipment surfaces disinfected   hand hygiene promoted   personal protective equipment utilized   single patient room provided   rest/sleep promoted   environmental surveillance performed  Taken 10/1/2021 1439 by Seven Bejarano RN  Infection Prevention:   cohorting utilized   environmental surveillance performed   equipment surfaces disinfected   personal protective equipment utilized   rest/sleep promoted   single patient room provided   hand hygiene promoted  Taken 10/1/2021 1111 by Seven Bejarano RN  Infection Prevention:   cohorting utilized   environmental surveillance performed   equipment surfaces disinfected   hand hygiene promoted   personal protective equipment utilized   rest/sleep promoted   single patient room provided  Taken 10/1/2021 0907 by Seven Bejarano RN  Infection Prevention:   cohorting utilized   environmental surveillance performed   equipment surfaces disinfected   hand hygiene promoted   personal protective equipment utilized   rest/sleep promoted   single patient room provided  Taken 10/1/2021 0727 by Seven Bejarano RN  Infection Prevention:   cohorting utilized   environmental surveillance performed   equipment surfaces disinfected   hand hygiene promoted   personal protective equipment utilized   rest/sleep promoted   single patient room provided  Goal: Optimal Comfort and Wellbeing  10/1/2021 1738 by Seven Bejarano RN  Outcome: Ongoing, Progressing  10/1/2021 1504 by Seven Bejarano RN  Outcome: Ongoing, Progressing  Intervention: Provide Person-Centered Care  Recent Flowsheet Documentation  Taken 10/1/2021 1439 by Seven Bejarano RN  Trust Relationship/Rapport:   care explained   choices provided   questions answered   questions  encouraged   reassurance provided   thoughts/feelings acknowledged  Taken 10/1/2021 0907 by Seven Bejarano, RN  Trust Relationship/Rapport:   care explained   choices provided   questions answered   questions encouraged   reassurance provided   thoughts/feelings acknowledged  Goal: Readiness for Transition of Care  10/1/2021 1738 by Seven Bejarano, RN  Outcome: Ongoing, Progressing  10/1/2021 1504 by Seven Bejarano, RN  Outcome: Ongoing, Progressing   Goal Outcome Evaluation:           Progress: improving  Outcome Summary: Restraints still necessary due to pulling, cortrak replaced, PICC fixed in IR, and tube feeding restarted.  Turn every two.  Large BM today.  Wounds addressed.

## 2021-10-01 NOTE — PROGRESS NOTES
"  Infectious Diseases Progress Note    Tricia Bose MD     [facility]  Los: 12 days  Patient Identification:  Name: Servando Kapadia  Age: 61 y.o.  Sex: male  :  1960  MRN: [id number]         Primary Care Physician: No primary care provider on file.            Subjective: Denies any specific complaints interactive but difficult to understand.  Interval History: See consultation note.    Objective:    Scheduled Meds:fosphenytoin, 100 mg PE, Intravenous, Q8H  insulin lispro, 0-9 Units, Subcutaneous, TID AC  midodrine, 5 mg, Nasogastric, TID AC  sodium bicarbonate, 1,300 mg, Nasogastric, TID  sodium chloride, 10 mL, Intravenous, Q12H  sodium chloride, 10 mL, Intravenous, Q12H  vancomycin, 1,000 mg, Intravenous, Q24H      Continuous Infusions:Pharmacy Consult,   Pharmacy to dose vancomycin,         Vital signs in last 24 hours:  Temp:  [97.1 °F (36.2 °C)-98.2 °F (36.8 °C)] 97.2 °F (36.2 °C)  Heart Rate:  [80-91] 84  Resp:  [16-18] 16  BP: ()/(75-87) 105/86    Intake/Output:    Intake/Output Summary (Last 24 hours) at 10/1/2021 1317  Last data filed at 10/1/2021 1311  Gross per 24 hour   Intake 620 ml   Output 1600 ml   Net -980 ml       Exam:  /86   Pulse 84   Temp 97.2 °F (36.2 °C) (Oral)   Resp 16   Ht 162.6 cm (64.02\")   Wt 67.7 kg (149 lb 4 oz)   SpO2 96%   BMI 25.61 kg/m²   Patient is examined using the personal protective equipment as per guidelines from infection control for this particular patient as enacted.  Hand washing was performed before and after patient interaction.  General Appearance: Chronically ill, awake, not answering questions, mumbles  Skin: warm and dry  HEENT: Oral mucosa is dry, nonicteric sclera, feeding tube in place  Neck: supple, no JVD  Lungs: Lateral rhonchi, unlabored breathing effort  Heart: RRR, normal S1 and S2, no S3, no rub  Abdomen: soft, no guarding, normoactive bowel, left lower/flank area pain pump site appears to be not inflamed or " having any drainage.  : no palpable bladder, Beyer catheter anchored  Extremities: Significant contractures  Neuro: Does not engage and interact and has sequelae of previous stroke with contractures of extremities.       Data Review:    I reviewed the patient's new clinical results.  Results from last 7 days   Lab Units 09/30/21  0556 09/29/21  0453 09/28/21  0618 09/26/21  0644 09/25/21  0537   WBC 10*3/mm3 8.99  --  8.38 7.75 7.88   HEMOGLOBIN g/dL 7.9* 7.8* 8.2* 8.4* 8.2*   PLATELETS 10*3/mm3 225  --  230 242 211     Results from last 7 days   Lab Units 09/30/21  0556 09/29/21  1535 09/28/21  0618 09/27/21  0628 09/26/21  1244 09/26/21  0644 09/25/21  0535   SODIUM mmol/L 140 139 142  --  146* 145 146*   POTASSIUM mmol/L 4.5 4.0 4.2  --  3.7 4.0 3.9   CHLORIDE mmol/L 109* 110* 115*  --  122* 122* 124*   CO2 mmol/L 27.1 25.5 21.4*  --  18.5* 17.3* 16.8*   BUN mg/dL 15 14 11  --  9 10 14   CREATININE mg/dL 0.33* 0.36* 0.34* 0.43* 0.43* 0.42* 0.44*   CALCIUM mg/dL 8.3* 8.1* 8.2*  --  8.8 8.7 8.8   GLUCOSE mg/dL 90 133* 117*  --  73 64* 57*       Microbiology Results (last 10 days)     Procedure Component Value - Date/Time    Blood Culture - Blood, Arm, Left [[id number]] Collected: 09/23/21 1322    Lab Status: Final result Specimen: Blood from Arm, Left Updated: 09/28/21 1346     Blood Culture No growth at 5 days    Blood Culture - Blood, Blood, PICC Line [[id number]] Collected: 09/23/21 0240    Lab Status: Final result Specimen: Blood, PICC Line Updated: 09/28/21 0301     Blood Culture No growth at 5 days          Assessment:    MRSA pneumonia (HCC)    Metabolic encephalopathy    Chronic anticoagulation    HCAP (healthcare-associated pneumonia)    Seizure disorder (HCC)    Hypernatremia    Elevated troponin    Acute on chronic diastolic heart failure (HCC)    Type 2 diabetes mellitus (HCC)    MRSA bacteremia  1-MRSA bacteremic sepsis likely secondary to healthcare associated pneumonia overall improved with current  treatment.  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        Recommendations/Discussions:  · No further work-up for origin/source of and secondary seeding of MRSA bacteremia unless he shows objective evidence of clinical deterioration.    · Given his inability to participate in review of systems and inability to decide which organ system needs to be worked up for secondary seeding or source of MRSA bacteremia and his propensity for future recurrent hospitalizations I would recommend completing treatment of bacteremic MRSA sepsis with 4 weeks of IV vancomycin from last negative blood culture.    · Continue aggressive supportive care and prevent complications of immobility and dysphagia if possible.  · Prognosis is guarded to poor with risk of future recurrent hospitalization  · Plans for feeding tube noted.    Tricia Bose MD  10/1/2021  13:17 EDT    Much of this encounter note is an electronic transcription/translation of spoken language to printed text. The electronic translation of spoken language may permit erroneous, or at times, nonsensical words or phrases to be inadvertently transcribed; Although I have reviewed the note for such errors, some may still exist     Detail Level: Detailed Size Of Lesion In Cm (Optional): 0

## 2021-10-01 NOTE — PLAN OF CARE
Goal Outcome Evaluation:  Plan of Care Reviewed With: patient        Progress: no change  Outcome Summary: VSS on room air. Q2 turn and reposition. Remains on restraints. Pulled out cortrak right before shift change. MD made aware and new order placed. Unable to flush or draw back from picc, MD aware chest xray obtained. Will contiue to monitor.

## 2021-10-01 NOTE — PROGRESS NOTES
Continued Stay Note  [facility]     Patient Name: Servando Kapadia  MRN: [id number]  Today's Date: 10/1/2021    Admit Date: 9/19/2021    Discharge Plan     Row Name 10/01/21 1815       Plan    Plan  Return to [facility] from long term care with a bedhold need EMS transport    Plan Comments  Plan for a Peg to be placed this weekend Sat or Sun.  Phone message for son to return call to [facility] to again verify dc plan.  Last spoke on 9/29 and he stated he prefers return to [facility].  Patient will need EMS transport and facility will need to be notified of DC.  Packet with report and fax is in the chart with EMS form..........................Tracie Almendarez RN        Discharge Codes    No documentation.       Expected Discharge Date and Time     Expected Discharge Date Expected Discharge Time    Oct 4, 2021             Tracie Almendarez RN

## 2021-10-01 NOTE — PROGRESS NOTES
[facility] Medicine Services  PROGRESS NOTE    Patient Name: Servando Kapadia  : 1960  MRN: [id number]    Date of Admission: 2021  Primary Care Physician: No primary care provider on file.    Subjective   Subjective     CC:  Follow-up pneumonia    HPI:  Patient resting comfortably.  Nursing noting increased issues with PICC line.  Nursing replacing feeding tube.  Unable to give much meaningful history from patient.    Review of Systems  Limited due to mental status.    Objective   Objective     Vital Signs:   Temp:  [97.1 °F (36.2 °C)-98.2 °F (36.8 °C)] 98.2 °F (36.8 °C)  Heart Rate:  [80-91] 80  Resp:  [16-18] 18  BP: (100-110)/(72-87) 104/79        Physical Exam:  Constitutional:Awake, alert, chronically ill-appearing  HENT: NCAT, mucous membranes moist, neck supple  Respiratory: Occasional coarse breath sounds, normal pulmonary effort and no respiratory distress  Cardiovascular: RRR, normal radial pulses  Gastrointestinal: Positive bowel sounds, soft, nontender, nondistended  Musculoskeletal: Normal musculature for age, no lower extremity edema, BMI 25  Psychiatric: Calm affect, not particularly conversational  Neurologic: Speaks occasional words, difficult to assess orientation, can follow some simple commands  Skin: No rashes or jaundice, warm      Results Reviewed:  Results from last 7 days   Lab Units 21  0556 21  0453 21  0618 21  0629 21  0644 21  0644 21  0537 21  0535   WBC 10*3/mm3 8.99  --  8.38  --   --  7.75   < >  --    HEMOGLOBIN g/dL 7.9* 7.8* 8.2*  --    < > 8.4*   < >  --    HEMATOCRIT % 23.5* 23.4* 24.7*  --    < > 26.0*   < >  --    PLATELETS 10*3/mm3 225  --  230  --   --  242   < >  --    INR   --   --   --  1.31*  --  1.39*  --  1.40*    < > = values in this interval not displayed.     Results from last 7 days   Lab Units 21  0556 21  1535 21  0618   SODIUM mmol/L 140 139 142   POTASSIUM mmol/L 4.5 4.0 4.2    CHLORIDE mmol/L 109* 110* 115*   CO2 mmol/L 27.1 25.5 21.4*   BUN mg/dL 15 14 11   CREATININE mg/dL 0.33* 0.36* 0.34*   GLUCOSE mg/dL 90 133* 117*   CALCIUM mg/dL 8.3* 8.1* 8.2*     Estimated Creatinine Clearance: 225.1 mL/min (A) (by C-G formula based on SCr of 0.33 mg/dL (L)).    Microbiology Results Abnormal     Procedure Component Value - Date/Time    Blood Culture - Blood, Arm, Left [[id number]] Collected: 09/23/21 1322    Lab Status: Final result Specimen: Blood from Arm, Left Updated: 09/28/21 1346     Blood Culture No growth at 5 days    Blood Culture - Blood, Blood, PICC Line [[id number]] Collected: 09/23/21 0240    Lab Status: Final result Specimen: Blood, PICC Line Updated: 09/28/21 0301     Blood Culture No growth at 5 days    Blood Culture - Blood, Arm, Right [[id number]] Collected: 09/19/21 1640    Lab Status: Final result Specimen: Blood from Arm, Right Updated: 09/24/21 1700     Blood Culture No growth at 5 days    Respiratory Panel PCR w/COVID-19(SARS-CoV-2) NADEGE/GARETH/SHARIF/PAD/COR/MAD/BRENNEN In-House, NP Swab in UTM/VTM, 3-4 HR TAT - Swab, Nasopharynx [[id number]]  (Normal) Collected: 09/19/21 1648    Lab Status: Final result Specimen: Swab from Nasopharynx Updated: 09/19/21 1753     ADENOVIRUS, PCR Not Detected     Coronavirus 229E Not Detected     Coronavirus HKU1 Not Detected     Coronavirus NL63 Not Detected     Coronavirus OC43 Not Detected     COVID19 Not Detected     Human Metapneumovirus Not Detected     Human Rhinovirus/Enterovirus Not Detected     Influenza A PCR Not Detected     Influenza B PCR Not Detected     Parainfluenza Virus 1 Not Detected     Parainfluenza Virus 2 Not Detected     Parainfluenza Virus 3 Not Detected     Parainfluenza Virus 4 Not Detected     RSV, PCR Not Detected     Bordetella pertussis pcr Not Detected     Bordetella parapertussis PCR Not Detected     Chlamydophila pneumoniae PCR Not Detected     Mycoplasma pneumo by PCR Not Detected    Narrative:      In the setting of a  positive respiratory panel with a viral infection PLUS a negative procalcitonin without other underlying concern for bacterial infection, consider observing off antibiotics or discontinuation of antibiotics and continue supportive care. If the respiratory panel is positive for atypical bacterial infection (Bordetella pertussis, Chlamydophila pneumoniae, or Mycoplasma pneumoniae), consider antibiotic de-escalation to target atypical bacterial infection.          Imaging Results (Last 24 Hours)     Procedure Component Value Units Date/Time    XR Chest Post CVA Port [[id number]] Collected: 09/30/21 1750     Updated: 09/30/21 1803    Narrative:      CHEST SINGLE VIEW     HISTORY: Check PICC placement     COMPARISON: AP chest 09/19/2021     FINDINGS: Right-sided PICC has been placed and the tip extends  superiorly into the right IJ vein and right neck and off the field of  view. This needs to be repositioned.     A feeding tube is present. There is hazy basilar opacity consistent with  a combination of pleural fluid and atelectasis or infiltrates. There is  no pneumothorax. There is gaseous distention of bowel loops in the upper  abdomen. Cardiac monitoring leads are noted.       Impression:      1. Right-sided PICC has been placed and the tip extends into the right  IJ vein/neck and off the field superiorly and this needs to be  repositioned.  2. Low lung volumes with small bilateral pleural effusions associated  with hazy basilar atelectasis or infiltrates.  3. Gaseous distention of bowel loops in the upper abdomen.  4. Feeding tube is present.     This report was finalized on 9/30/2021 6:00 PM by Dr. Richard Pfeiffer M.D.             Results for orders placed during the hospital encounter of 09/19/21    Adult Transthoracic Echo Complete W/ Cont if Necessary Per Protocol    Interpretation Summary  · Estimated left ventricular EF = 65% Left ventricular systolic function is normal.  · The left ventricular cavity is small  in size.  · Left ventricular diastolic function was normal.  · Very technically difficult study with limited views available      I have reviewed the medications:  Scheduled Meds:fosphenytoin, 100 mg PE, Intravenous, Q8H  insulin lispro, 0-9 Units, Subcutaneous, TID AC  midodrine, 5 mg, Nasogastric, TID AC  sodium bicarbonate, 1,300 mg, Nasogastric, TID  sodium chloride, 10 mL, Intravenous, Q12H  sodium chloride, 10 mL, Intravenous, Q12H  vancomycin, 1,000 mg, Intravenous, Q24H      Continuous Infusions:Pharmacy Consult,   Pharmacy to dose vancomycin,       PRN Meds:.•  acetaminophen  •  acetaminophen  •  bisacodyl  •  dextrose  •  dextrose  •  glucagon (human recombinant)  •  nitroglycerin  •  ondansetron **OR** ondansetron  •  Pharmacy Consult  •  Pharmacy to dose vancomycin  •  potassium chloride  •  potassium phosphate infusion greater than 15 mMoles **OR** potassium phosphate infusion greater than 15 mMoles **OR** potassium phosphate **OR** sodium phosphate IVPB **OR** sodium phosphate IVPB  •  [COMPLETED] Insert peripheral IV **AND** sodium chloride  •  sodium chloride  •  sodium chloride  •  sodium chloride    Assessment/Plan   Assessment & Plan     Active Hospital Problems    Diagnosis  POA   • **MRSA pneumonia ([facility]) [J15.212]  Yes   • MRSA bacteremia [R78.81, B95.62]  Yes   • Type 2 diabetes mellitus ([facility]) [E11.9]  Yes   • HCAP (healthcare-associated pneumonia) [J18.9]  Yes   • Seizure disorder ([facility]) [G40.909]  Yes   • Hypernatremia [E87.0]  Yes   • Elevated troponin [R77.8]  Yes   • Acute on chronic diastolic heart failure ([facility]) [I50.33]  Yes   • Chronic anticoagulation [Z79.01]  Not Applicable   • Metabolic encephalopathy [G93.41]  Yes      Resolved Hospital Problems    Diagnosis Date Resolved POA   • Hypercalcemia [E83.52] 09/23/2021 Yes   • Hyperkalemia [E87.5] 09/21/2021 Yes   • TI (acute kidney injury) ([facility]) [N17.9] 09/23/2021 Yes   • High anion gap metabolic acidosis [E87.2] 09/23/2021 Yes   •  Sepsis with acute organ dysfunction (HCC) [A41.9, R65.20] 09/23/2021 Yes        Brief Hospital Course to date:  Servando Kapadia is a 61 y.o. male with history of stroke with left-sided hemiparesis, type 2 diabetes, A. fib, seizure disorder who was sent in from his nursing home for shortness of breath.  He was admitted with septic shock due to MRSA bacteremia and pneumonia.     Dysphagia  -Dobbhoff tube in place.  Speech therapy recommends remain n.p.o.  -Discussed case with brother and sister in law.  They want to proceed with PEG tube.   -GI consulted for placement, noted Eliquis held for procedure     Septic shock due to MRSA bacteremia pneumonia  -On IV Vanc, plan for 4 weeks total from negative blood culture (end 10/21)  -TTE complete but not the most diagnostic as was technically difficult study.       Seizure disorder on fosphenytoin   -Neurology was concerned Keppra contributed to sedation.  Keppra was discontinued and Dilantin increased.     ARF/hyponatremia/acidosis   -Resolved     Elevated troponin secondary to renal disease/probable type II MI  -No global dyskinesia-hypokinesis with EF 65% on 9/21/2021 echo     Type 2 diabetes  -Issues with hypoglycemia earlier in admission have resolved  -Continue sliding scale     Past history of CVA with resulting immobility on Eliquis .     Metabolic encephalopathy  -Mental status is slowly improving.  He remains very dysarthric, but follows commands, answers yes or no questions     HTN off meds with improving hypotension on midodrine  -Home blood pressure medicines held on admission due to shock.  Currently requiring midodrine 5 mg 3 times daily.  Wean as tolerated        · DVT prophylaxis , Eliquis held in anticipation of procedure.    SCDs for now  · DNR.  · Discussed with patient and nursing  · Anticipate discharge to SNU facility timing yet to be determined.         CODE STATUS:   Code Status and Medical Interventions:   Ordered at: 09/19/21 2139     Limited  Support to NOT Include:    Antiarrhythmic Drugs    Cardioversion/Defibrillation    Dialysis    Intubation    NIPPV (Non-Invasive Positive Pressure Support)    Vasopressors     Level Of Support Discussed With:    Next of Kin (If No Surrogate)     Code Status:    No CPR     Medical Interventions (Level of Support Prior to Arrest):    Limited       Jacoby Triana MD  10/01/21

## 2021-10-01 NOTE — CONSULTS
Nutrition Services    Patient Name:  Servando Kapadia  YOB: 1960  MRN: [id number]  Admit Date:  9/19/2021    Nutrition Consult: Cortrak Placement  Replaced cortrak tube gastrcially at the bedside via cortrak machine.      Electronically signed by:  Vanna Reynoso RD  10/01/21 11:28 EDT

## 2021-10-01 NOTE — PROGRESS NOTES
Adult Nutrition  Assessment/PES    Patient Name:  Servando Kapadia  YOB: 1960  MRN: [id number]  Admit Date:  9/19/2021    Assessment Date:  10/1/2021    Comments:  Nutrition Follow- up  Tube feeds off at the time of visit. Feeding tube was pulled out again. Replaced via cortrak machine at the bedside. Patient previously tolerating Diabetisource AC at 70ml/hr (goal) and 50 ml free water flush every 4 hrs.  Family wants to proceed with PEG placement, per notes.  Rec- Resume Diabetisource at goal rate of 70ml/hr.    RD to continue to follow clinical course.      Reason for Assessment     Row Name 10/01/21 1239          Reason for Assessment    Reason For Assessment  follow-up protocol         Nutrition/Diet History     Row Name 10/01/21 1239          Nutrition/Diet History    Typical Food/Fluid Intake  tube pulled out again.  Replaced cortrak tube gastrically at the bedside.     Factors Affecting Nutritional Intake  impaired cognitive status/motor control;difficulty/impaired swallowing         Anthropometrics     Row Name 10/01/21 0530          Anthropometrics    Weight  67.7 kg (149 lb 4 oz)         Labs/Tests/Procedures/Meds     Row Name 10/01/21 1240          Labs/Procedures/Meds    Lab Results Reviewed  reviewed     Lab Results Comments  Phos: 2.4 (9/30)        Diagnostic Tests/Procedures    Diagnostic Test/Procedure Reviewed  reviewed     Diagnostic Test/Procedures Comments  feeding tube placed 10/1.  Plans for PEG 10/2        Medications    Pertinent Medications Reviewed  reviewed, pertinent     Pertinent Medications Comments  Vanc, fosphenytoin (injection), NaCl, Kphos, KCl         Physical Findings     Row Name 10/01/21 1241          Physical Findings    Overall Physical Appearance  other (see comments);overweight Room air     Gastrointestinal  feeding tube     Tubes  nasogastric tube     Skin  pressure injury;edema           Nutrition Prescription Ordered     Row Name 10/01/21 1242           Nutrition Prescription PO    Current PO Diet  NPO        Nutrition Prescription EN    Product  Diabetisource AC (Glucerna 1.2)     TF Delivery Method  Continuous     Continuous TF Goal Rate (mL/hr)  70 mL/hr     Continuous TF Current Rate (mL/hr)  0 mL/hr     Water flush (mL)   50 mL     Water Flush Frequency  Every 4 hours         Evaluation of Received Nutrient/Fluid Intake     Row Name 10/01/21 1243          Calories Evaluation    Enteral Calories (kcal)  2016        Protein Evaluation    Enteral Protein (gm)  100.8        Intake Assessment    Energy/Calorie Requirement Assessment  meeting needs     Protein Requirement Assessment  meeting needs     Fluid Requirement Assessment  meeting needs        Fluid Intake Evaluation    Enteral (Free Water) Fluid (mL)  1377.6     Free Water Flush Fluid (mL)  300     Total Free Water Intake (mL)  1677.6 mL               Problem/Interventions:          Intervention Goal     Row Name 10/01/21 1244          Intervention Goal    General  Maintain nutrition;Nutrition support treatment;Reduce/improve symptoms;Meet nutritional needs for age/condition     TF/PN  Maintain TF/PN;Deliver (%) goal     Deliver % of Goal  100 %     Weight  Maintain weight         Nutrition Intervention     Row Name 10/01/21 1244          Nutrition Intervention    RD/Tech Action  Follow Tx progress;Care plan reviewd           Education/Evaluation     Row Name 10/01/21 1244          Education    Education  Education not appropriate at this time     Please explain  Patient confusion        Monitor/Evaluation    Monitor  Per protocol     Education Follow-up  Reinforce PRN           Electronically signed by:  Vanna Reynoso RD  10/01/21 12:45 EDT

## 2021-10-01 NOTE — PROGRESS NOTES
[facility]     Progress Note    Patient Name: Servando Kapadia  : 1960  MRN: [id number]  Primary Care Physician:  No primary care provider on file.  Date of admission: 2021    Subjective   Subjective     Chief Complaint: dysphagia  History of Present Illness  Patient Reports feeling ok, smiling     Review of Systems   Unable to perform ROS: Psychiatric disorder       Objective   Objective     Vitals:   Temp:  [97.1 °F (36.2 °C)-97.8 °F (36.6 °C)] 97.1 °F (36.2 °C)  Heart Rate:  [81-92] 88  Resp:  [16-18] 16  BP: (100-117)/() 110/87    Physical Exam  HENT:      Right Ear: External ear normal.      Left Ear: External ear normal.      Mouth/Throat:      Pharynx: Oropharynx is clear.   Eyes:      Conjunctiva/sclera: Conjunctivae normal.   Cardiovascular:      Rate and Rhythm: Normal rate.      Pulses: Normal pulses.   Pulmonary:      Effort: Pulmonary effort is normal.   Abdominal:      General: Abdomen is flat.   Neurological:      General: No focal deficit present.      Mental Status: He is alert.   Psychiatric:         Mood and Affect: Mood normal.          Result Review    Result Review:  I have personally reviewed the results from the time of this admission to 10/1/2021 06:10 EDT and agree with these findings:  []  Laboratory  []  Microbiology  []  Radiology  []  EKG/Telemetry   []  Cardiology/Vascular   []  Pathology  []  Old records  []  Other:    Assessment/Plan   Assessment / Plan     Brief Patient Summary:  Servando Kapadia is a 61 y.o. male   oropharygneal dysphagia  Pneumonia    Active Hospital Problems:  Active Hospital Problems    Diagnosis    • **MRSA pneumonia (HCC)    • MRSA bacteremia    • Type 2 diabetes mellitus (HCC)    • HCAP (healthcare-associated pneumonia)    • Seizure disorder (HCC)    • Hypernatremia    • Elevated troponin    • Acute on chronic diastolic heart failure (HCC)    • Chronic anticoagulation    • Metabolic encephalopathy      Plan: hope for egd with gastrostomy  tube placement .  This will be done Sunday ( covereing MD tomorrow does not do gastrostomy tube placement)   rn notified.      DVT prophylaxis:  Medical and mechanical DVT prophylaxis orders are present.    CODE STATUS:    Limited Support to NOT Include: Antiarrhythmic Drugs; Cardioversion/Defibrillation; Dialysis; Intubation; NIPPV (Non-Invasive Positive Pressure Support); Vasopressors  Level Of Support Discussed With: Next of Kin (If No Surrogate)  Code Status: No CPR  Medical Interventions (Level of Support Prior to Arrest): Limited    Disposition:  I expect patient to be discharged uncertain     Electronically signed by Lion Weber MD, 10/01/21, 6:10 AM EDT.

## 2021-10-01 NOTE — PROGRESS NOTES
"Physicians Statement of Medical Necessity for  Ambulance Transportation    GENERAL INFORMATION     Name: Servando Kapadia  YOB: 1960  Medicare #: [id number]  Transport Date:                 (Valid for round trips this date, or for scheduled repetitive trips for 60 days from the date signed below.)  Origin: [facility]     Destination: [facility]  Is the Patient's stay covered under Medicare Part A (PPS/DRG?)Yes  Closest appropriate facility? Yes  If this a hosp-hosp transfer? No  Is this a hospice patient? No    MEDICAL NECESSITY QUESTIONAIRE    Ambulance Transportation is medically necessary only if other means of transportation are contraindicated or would be potentially harmful to the patient.  To meet this requirement, the patient must be either \"bed confined\" or suffer from a condition such that transport by means other than an ambulance is contraindicated by the patient's condition.  The following questions must be answered by the healthcare professional signing below for this form to be valid:     1) Describe the MEDICAL CONDITION (physical and/or mental) of this patient AT THE TIME OF AMBULANCE TRANSPORT that requires the patient to be transported in an ambulance, and why transport by other means is contraindicated by the patient's condition: Dehydration  Past Medical History:   Diagnosis Date   • Anemia    • Atrial fibrillation (CMS/HCC)    • Atrial fibrillation (CMS/HCC)    • Benign prostatic hyperplasia with lower urinary tract symptoms    • Cerebral infarct (CMS/HCC)    • Chronic anticoagulation    • Chronic obstructive pyelonephritis    • Constipation    • Depression    • Diabetes mellitus (CMS/HCC)     type 2   • Enlarged prostate    • Hemiplegia and hemiparesis    • Hyperlipidemia    • Hypertension    • Kidney stone    • Seizures (CMS/HCC)    • Sleep apnea    • Stroke (CMS/HCC)     apparent right side residual weakness   • Urinary incontinence       Past Surgical History:   Procedure Laterality " "Date   • BACLOFEN PUMP IMPLANTATION      [address]  Serial #[id number]  Dr Salvador [phone]   • CYSTOSCOPY W/ URETERAL STENT PLACEMENT N/A 5/3/2016    Procedure: CYSTOSCOPY LASER LITHOTRIPSY LEFT RETROGRADE URETERAL CATHETER AND STENT PLACEMENT;  Surgeon: Eduin Brennan MD;  Location: [facility];  Service:    • ENDOSCOPY W/ PEG TUBE PLACEMENT N/A 5/13/2016    Procedure: ESOPHAGOGASTRODUODENOSCOPY WITH PERCUTANEOUS ENDOSCOPIC GASTROSTOMY TUBE INSERTION;  Surgeon: Lion Weber MD;  Location: [facility];  Service:    • URETEROSCOPY LASER LITHOTRIPSY WITH STENT INSERTION N/A 6/8/2016    Procedure: URETEROSCOPY LASER LITHOTRIPSY STONE EXTRACTION WITH JJ STENT INSERTION;  Surgeon: Eduin Brennan MD;  Location: [facility];  Service:       2) Is this patient \"bed confined\" as defined below?Yes   To be \"bed confined\" the patient must satisfy all three of the following criteria:  (1) unable to get up from bed without assistance; AND (2) unable to ambulate;  AND (3) unable to sit in a chair or wheelchair.  3) Can this patient safely be transported by car or wheelchair van (I.e., may safely sit during transport, without an attendant or monitoring?)No   4. In addition to completing questions 1-3 above, please check any of the following conditions that apply*:          *Note: supporting documentation for any boxes checked must be maintained in the patient's medical records Unable to sit in a chair or wheelchair due to decubitus ulcers or other wounds, CVA, immobility      SIGNATURE OF PHYSICIAN OR OTHER AUTHORIZED HEALTHCARE PROFESSIONAL    I certify that the above information is true and correct based on my evaluation of this patient, and represent that the patient requires transport by ambulance and that other forms of transport are contraindicated.  I understand that this information will be used by the Centers for Medicare and Medicaid Services (CMS) to support the determiniation of " medical necessity for ambulance services, and I represent that I have personal knowledge of the patient's condition at the time of transport.       If this box is checked, I also certify that the patient is physically or mentally incapable of signing the ambulance service's claim form and that the institution with which I am affiliated has furnished care, services or assistance to the patient.  My signature below is made on behalf of the patient pursuant to 42 .36(b)(4). In accordance with 42 .37, the specific reason(s) that the patient is physically or mentally incapable of signing the claim for is as follows:     Signature of Physician or Healthcare Professional  Date/Time:        (For Scheduled repetitive transport, this form is not valid for transports performed more than 60 days after this date).                                                                                                                                            --------------------------------------------------------------------------------------------  Printed Name and Credentials of Physician or Authorized Healthcare Professional     *Form must be signed by patient's attending physician for scheduled, repetitive transports,.  For non-repetitive ambulance transports, if unable to obtain the signature of the attending physician, any of the following may sign (please select below):     Physician  Clinical Nurse Specialist  Registered Nurse     Physician Assistant  Discharge Planner  Licensed Practical Nurse     Nurse Practitioner

## 2021-10-01 NOTE — PLAN OF CARE
Problem: Adult Inpatient Plan of Care  Goal: Plan of Care Review  Outcome: Ongoing, Progressing  Flowsheets  Taken 10/1/2021 1504 by Seven Bejarano RN  Progress: no change  Outcome Summary: Down stairs for IR PICC line adjustment, Radiologist okay with one consent form.  PEG tube in future once family calls back and gives verbal concent for placement otherwise cortrak in place.  Large BM today.  Tube feed restarted.  Taken 10/1/2021 0440 by Ilene Amor RN  Plan of Care Reviewed With: patient  Goal: Patient-Specific Goal (Individualized)  Outcome: Ongoing, Progressing  Goal: Absence of Hospital-Acquired Illness or Injury  Outcome: Ongoing, Progressing  Intervention: Identify and Manage Fall Risk  Recent Flowsheet Documentation  Taken 10/1/2021 1439 by Seven Bejarano RN  Safety Promotion/Fall Prevention:   fall prevention program maintained   clutter free environment maintained   activity supervised   muscle strengthening facilitated   nonskid shoes/slippers when out of bed   room organization consistent   safety round/check completed   toileting scheduled  Taken 10/1/2021 1300 by Seven Bejarano RN  Safety Promotion/Fall Prevention: safety round/check completed  Taken 10/1/2021 1111 by Seven Bejarano RN  Safety Promotion/Fall Prevention:   room organization consistent   safety round/check completed   toileting scheduled  Taken 10/1/2021 0907 by Seven Bejarano RN  Safety Promotion/Fall Prevention:   nonskid shoes/slippers when out of bed   muscle strengthening facilitated   room organization consistent   safety round/check completed   toileting scheduled  Taken 10/1/2021 0727 by Seven Bejarano, RN  Safety Promotion/Fall Prevention: safety round/check completed  Intervention: Prevent Skin Injury  Recent Flowsheet Documentation  Taken 10/1/2021 1439 by Seven Bejarano RN  Body Position:   turned   legs elevated   log-rolled   tilted, left   weight shift assistance provided  Taken 10/1/2021 1300 by Seven Bejarano  RN  Body Position:   tilted, right   weight shift assistance provided   legs elevated   log-rolled   turned  Taken 10/1/2021 1111 by Seven Bejarano RN  Body Position:   supine   weight shift assistance provided   turned  Taken 10/1/2021 0907 by Seven Bejarano RN  Body Position:   tilted, left   legs elevated   log-rolled   turned   weight shift assistance provided  Skin Protection: incontinence pads utilized  Taken 10/1/2021 0727 by Seven Bejarano RN  Body Position:   tilted, left   turned   weight shift assistance provided  Intervention: Prevent and Manage VTE (venous thromboembolism) Risk  Recent Flowsheet Documentation  Taken 10/1/2021 0907 by Seven Bejarano RN  VTE Prevention/Management: (dvt proph )   bilateral   dorsiflexion/plantar flexion performed   bleeding risk factor(s) identified   other (see comments)  Intervention: Prevent Infection  Recent Flowsheet Documentation  Taken 10/1/2021 1439 by Seven Bejarano RN  Infection Prevention:   cohorting utilized   environmental surveillance performed   equipment surfaces disinfected   personal protective equipment utilized   rest/sleep promoted   single patient room provided   hand hygiene promoted  Taken 10/1/2021 1111 by Seven Bejarano RN  Infection Prevention:   cohorting utilized   environmental surveillance performed   equipment surfaces disinfected   hand hygiene promoted   personal protective equipment utilized   rest/sleep promoted   single patient room provided  Taken 10/1/2021 0907 by Seven Bejarano RN  Infection Prevention:   cohorting utilized   environmental surveillance performed   equipment surfaces disinfected   hand hygiene promoted   personal protective equipment utilized   rest/sleep promoted   single patient room provided  Taken 10/1/2021 0727 by Seven Bejarano RN  Infection Prevention:   cohorting utilized   environmental surveillance performed   equipment surfaces disinfected   hand hygiene promoted   personal protective equipment utilized    rest/sleep promoted   single patient room provided  Goal: Optimal Comfort and Wellbeing  Outcome: Ongoing, Progressing  Intervention: Provide Person-Centered Care  Recent Flowsheet Documentation  Taken 10/1/2021 1439 by Seven Bejarano, RN  Trust Relationship/Rapport:   care explained   choices provided   questions answered   questions encouraged   reassurance provided   thoughts/feelings acknowledged  Taken 10/1/2021 0907 by Seven Bejarano, RN  Trust Relationship/Rapport:   care explained   choices provided   questions answered   questions encouraged   reassurance provided   thoughts/feelings acknowledged  Goal: Readiness for Transition of Care  Outcome: Ongoing, Progressing   Goal Outcome Evaluation:           Progress: no change  Outcome Summary: Down stairs for IR PICC line adjustment, Radiologist okay with one consent form.  PEG tube in future once family calls back and gives verbal concent for placement otherwise cortrak in place.  Large BM today.  Tube feed restarted.

## 2021-10-02 LAB
ALBUMIN SERPL-MCNC: 1.7 G/DL (ref 3.5–5.2)
ALBUMIN/GLOB SERPL: 0.6 G/DL
ALP SERPL-CCNC: 70 U/L (ref 39–117)
ALT SERPL W P-5'-P-CCNC: 12 U/L (ref 1–41)
ANION GAP SERPL CALCULATED.3IONS-SCNC: 5.3 MMOL/L (ref 5–15)
AST SERPL-CCNC: 13 U/L (ref 1–40)
BILIRUB SERPL-MCNC: <0.2 MG/DL (ref 0–1.2)
BUN SERPL-MCNC: 17 MG/DL (ref 8–23)
BUN/CREAT SERPL: 45.9 (ref 7–25)
CALCIUM SPEC-SCNC: 8.4 MG/DL (ref 8.6–10.5)
CHLORIDE SERPL-SCNC: 105 MMOL/L (ref 98–107)
CO2 SERPL-SCNC: 28.7 MMOL/L (ref 22–29)
CREAT SERPL-MCNC: 0.37 MG/DL (ref 0.76–1.27)
DEPRECATED RDW RBC AUTO: 44.1 FL (ref 37–54)
ERYTHROCYTE [DISTWIDTH] IN BLOOD BY AUTOMATED COUNT: 14.8 % (ref 12.3–15.4)
GFR SERPL CREATININE-BSD FRML MDRD: >150 ML/MIN/1.73
GFR SERPL CREATININE-BSD FRML MDRD: >150 ML/MIN/1.73
GLOBULIN UR ELPH-MCNC: 2.7 GM/DL
GLUCOSE BLDC GLUCOMTR-MCNC: 105 MG/DL (ref 70–130)
GLUCOSE BLDC GLUCOMTR-MCNC: 125 MG/DL (ref 70–130)
GLUCOSE BLDC GLUCOMTR-MCNC: 132 MG/DL (ref 70–130)
GLUCOSE BLDC GLUCOMTR-MCNC: 136 MG/DL (ref 70–130)
GLUCOSE BLDC GLUCOMTR-MCNC: 156 MG/DL (ref 70–130)
GLUCOSE SERPL-MCNC: 124 MG/DL (ref 65–99)
HCT VFR BLD AUTO: 22.4 % (ref 37.5–51)
HGB BLD-MCNC: 7.3 G/DL (ref 13–17.7)
MCH RBC QN AUTO: 27 PG (ref 26.6–33)
MCHC RBC AUTO-ENTMCNC: 32.6 G/DL (ref 31.5–35.7)
MCV RBC AUTO: 83 FL (ref 79–97)
PLATELET # BLD AUTO: 236 10*3/MM3 (ref 140–450)
PMV BLD AUTO: 9.7 FL (ref 6–12)
POTASSIUM SERPL-SCNC: 3.9 MMOL/L (ref 3.5–5.2)
PROT SERPL-MCNC: 4.4 G/DL (ref 6–8.5)
RBC # BLD AUTO: 2.7 10*6/MM3 (ref 4.14–5.8)
SODIUM SERPL-SCNC: 139 MMOL/L (ref 136–145)
WBC # BLD AUTO: 8.88 10*3/MM3 (ref 3.4–10.8)

## 2021-10-02 PROCEDURE — 80053 COMPREHEN METABOLIC PANEL: CPT | Performed by: INTERNAL MEDICINE

## 2021-10-02 PROCEDURE — 25010000002 VANCOMYCIN PER 500 MG: Performed by: INTERNAL MEDICINE

## 2021-10-02 PROCEDURE — 25010000002 FOSPHENYTOIN 100 MG PE/2ML SOLUTION: Performed by: HOSPITALIST

## 2021-10-02 PROCEDURE — 82962 GLUCOSE BLOOD TEST: CPT

## 2021-10-02 PROCEDURE — 85027 COMPLETE CBC AUTOMATED: CPT | Performed by: INTERNAL MEDICINE

## 2021-10-02 RX ADMIN — MIDODRINE HYDROCHLORIDE 5 MG: 5 TABLET ORAL at 18:09

## 2021-10-02 RX ADMIN — SODIUM BICARBONATE 1300 MG: 650 TABLET ORAL at 16:05

## 2021-10-02 RX ADMIN — SODIUM BICARBONATE 1300 MG: 650 TABLET ORAL at 20:51

## 2021-10-02 RX ADMIN — MIDODRINE HYDROCHLORIDE 5 MG: 5 TABLET ORAL at 06:33

## 2021-10-02 RX ADMIN — FOSPHENYTOIN SODIUM 100 MG PE: 50 INJECTION, SOLUTION INTRAMUSCULAR; INTRAVENOUS at 08:01

## 2021-10-02 RX ADMIN — FOSPHENYTOIN SODIUM 100 MG PE: 50 INJECTION, SOLUTION INTRAMUSCULAR; INTRAVENOUS at 16:05

## 2021-10-02 RX ADMIN — VANCOMYCIN HYDROCHLORIDE 1000 MG: 1 INJECTION, SOLUTION INTRAVENOUS at 09:58

## 2021-10-02 RX ADMIN — SODIUM CHLORIDE, PRESERVATIVE FREE 10 ML: 5 INJECTION INTRAVENOUS at 20:52

## 2021-10-02 RX ADMIN — FOSPHENYTOIN SODIUM 100 MG PE: 50 INJECTION, SOLUTION INTRAMUSCULAR; INTRAVENOUS at 23:31

## 2021-10-02 RX ADMIN — MIDODRINE HYDROCHLORIDE 5 MG: 5 TABLET ORAL at 12:00

## 2021-10-02 RX ADMIN — SODIUM CHLORIDE, PRESERVATIVE FREE 10 ML: 5 INJECTION INTRAVENOUS at 08:01

## 2021-10-02 RX ADMIN — SODIUM BICARBONATE 1300 MG: 650 TABLET ORAL at 08:01

## 2021-10-02 NOTE — PROGRESS NOTES
[facility] Medicine Services  PROGRESS NOTE    Patient Name: Servando Kapadia  : 1960  MRN: [id number]    Date of Admission: 2021  Primary Care Physician: No primary care provider on file.    Subjective   Subjective     CC:  Follow-up pneumonia    HPI:  Patient resting comfortably.  PICC repositioned and reportedly functioning appropriately per nursing.  Worsening anemia noted without reported bleeding.  Patient with occasional verbalization but not conversational    Review of Systems  Limited due to mental status.    Objective   Objective     Vital Signs:   Temp:  [95.7 °F (35.4 °C)-98.5 °F (36.9 °C)] 97.6 °F (36.4 °C)  Heart Rate:  [71-92] 74  Resp:  [16-18] 16  BP: ()/(58-86) 92/75        Physical Exam:  Constitutional:Awake, alert, chronically ill-appearing  HENT: NCAT, mucous membranes moist, neck supple  Respiratory: Occasional coarse breath sounds, normal pulmonary effort and no respiratory distress  Cardiovascular: RRR, normal radial pulses  Gastrointestinal: Positive bowel sounds, soft, nontender, nondistended  Musculoskeletal: Normal musculature for age, no lower extremity edema, BMI 25  Psychiatric: Calm affect, not conversational but occasional verbalization  Neurologic: Speaks occasional words, difficult to assess orientation, can follow some simple commands  Skin: No rashes or jaundice, warm      Results Reviewed:  Results from last 7 days   Lab Units 10/02/21  0646 21  0556 21  0453 21  0618 21  0618 21  0629 21  0644 21  0644   WBC 10*3/mm3 8.88 8.99  --   --  8.38  --    < > 7.75   HEMOGLOBIN g/dL 7.3* 7.9* 7.8*   < > 8.2*  --    < > 8.4*   HEMATOCRIT % 22.4* 23.5* 23.4*   < > 24.7*  --    < > 26.0*   PLATELETS 10*3/mm3 236 225  --   --  230  --    < > 242   INR   --   --   --   --   --  1.31*  --  1.39*    < > = values in this interval not displayed.     Results from last 7 days   Lab Units 10/02/21  0647 21  0556  09/29/21  1535   SODIUM mmol/L 139 140 139   POTASSIUM mmol/L 3.9 4.5 4.0   CHLORIDE mmol/L 105 109* 110*   CO2 mmol/L 28.7 27.1 25.5   BUN mg/dL 17 15 14   CREATININE mg/dL 0.37* 0.33* 0.36*   GLUCOSE mg/dL 124* 90 133*   CALCIUM mg/dL 8.4* 8.3* 8.1*   ALT (SGPT) U/L 12  --   --    AST (SGOT) U/L 13  --   --      Estimated Creatinine Clearance: 207.6 mL/min (A) (by C-G formula based on SCr of 0.37 mg/dL (L)).    Microbiology Results Abnormal     Procedure Component Value - Date/Time    Blood Culture - Blood, Arm, Left [[id number]] Collected: 09/23/21 1322    Lab Status: Final result Specimen: Blood from Arm, Left Updated: 09/28/21 1346     Blood Culture No growth at 5 days    Blood Culture - Blood, Blood, PICC Line [[id number]] Collected: 09/23/21 0240    Lab Status: Final result Specimen: Blood, PICC Line Updated: 09/28/21 0301     Blood Culture No growth at 5 days    Blood Culture - Blood, Arm, Right [[id number]] Collected: 09/19/21 1640    Lab Status: Final result Specimen: Blood from Arm, Right Updated: 09/24/21 1700     Blood Culture No growth at 5 days    Respiratory Panel PCR w/COVID-19(SARS-CoV-2) NADEGE/GARETH/SHARIF/PAD/COR/MAD/BRENNEN In-House, NP Swab in [facility], 3-4 HR TAT - Swab, Nasopharynx [[id number]]  (Normal) Collected: 09/19/21 1648    Lab Status: Final result Specimen: Swab from Nasopharynx Updated: 09/19/21 1753     ADENOVIRUS, PCR Not Detected     Coronavirus 229E Not Detected     Coronavirus HKU1 Not Detected     Coronavirus NL63 Not Detected     Coronavirus OC43 Not Detected     COVID19 Not Detected     Human Metapneumovirus Not Detected     Human Rhinovirus/Enterovirus Not Detected     Influenza A PCR Not Detected     Influenza B PCR Not Detected     Parainfluenza Virus 1 Not Detected     Parainfluenza Virus 2 Not Detected     Parainfluenza Virus 3 Not Detected     Parainfluenza Virus 4 Not Detected     RSV, PCR Not Detected     Bordetella pertussis pcr Not Detected     Bordetella parapertussis PCR Not  Detected     Chlamydophila pneumoniae PCR Not Detected     Mycoplasma pneumo by PCR Not Detected    Narrative:      In the setting of a positive respiratory panel with a viral infection PLUS a negative procalcitonin without other underlying concern for bacterial infection, consider observing off antibiotics or discontinuation of antibiotics and continue supportive care. If the respiratory panel is positive for atypical bacterial infection (Bordetella pertussis, Chlamydophila pneumoniae, or Mycoplasma pneumoniae), consider antibiotic de-escalation to target atypical bacterial infection.          Imaging Results (Last 24 Hours)     Procedure Component Value Units Date/Time    IR reposition central line w fluoro [[id number]] Collected: 10/01/21 1642     Updated: 10/01/21 1647    Narrative:      PICC LINE REPOSITION     INDICATION: Malpositioned right PICC line     Total fluoroscopy time 9 seconds. 2 fluoroscopic images obtained.     TECHNIQUE:  Maximum sterile barrier technique was used including sterile cap, mask,  gloves, gown and large sterile sheet as well as pre-procedure hand  washing and cutaneous antisepsis with 2 % chlorhexidine.      image obtained showing the existing right upper extremity PICC  line with tip in the right internal jugular vein. A wire was advanced  through the PICC line under fluoroscopic guidance. The PICC line was  then retracted and advanced back into the lower SVC over the wire. There  was good blood return from the PICC line and it flushed easily upon  completion. Sterile dressing was applied. No immediate complications.       Impression:      Successful right-sided PICC line repositioning over a guidewire     This report was finalized on 10/1/2021 4:44 PM by Dr. Khalif Leonard M.D.             Results for orders placed during the hospital encounter of 09/19/21    Adult Transthoracic Echo Complete W/ Cont if Necessary Per Protocol    Interpretation Summary  · Estimated left  ventricular EF = 65% Left ventricular systolic function is normal.  · The left ventricular cavity is small in size.  · Left ventricular diastolic function was normal.  · Very technically difficult study with limited views available      I have reviewed the medications:  Scheduled Meds:ceFAZolin, 1 g, Intravenous, Once  [START ON 10/3/2021] ceFAZolin, 1 g, Intravenous, Once  fosphenytoin, 100 mg PE, Intravenous, Q8H  insulin lispro, 0-9 Units, Subcutaneous, TID AC  midodrine, 5 mg, Nasogastric, TID AC  sodium bicarbonate, 1,300 mg, Nasogastric, TID  sodium chloride, 10 mL, Intravenous, Q12H  sodium chloride, 10 mL, Intravenous, Q12H  vancomycin, 1,000 mg, Intravenous, Q24H      Continuous Infusions:Pharmacy Consult,   Pharmacy to dose vancomycin,       PRN Meds:.•  acetaminophen  •  acetaminophen  •  bisacodyl  •  dextrose  •  dextrose  •  glucagon (human recombinant)  •  nitroglycerin  •  ondansetron **OR** ondansetron  •  Pharmacy Consult  •  Pharmacy to dose vancomycin  •  potassium chloride  •  potassium phosphate infusion greater than 15 mMoles **OR** potassium phosphate infusion greater than 15 mMoles **OR** potassium phosphate **OR** sodium phosphate IVPB **OR** sodium phosphate IVPB  •  [COMPLETED] Insert peripheral IV **AND** sodium chloride  •  sodium chloride  •  sodium chloride  •  sodium chloride    Assessment/Plan   Assessment & Plan     Active Hospital Problems    Diagnosis  POA   • **MRSA pneumonia ([facility]) [J15.212]  Yes   • MRSA bacteremia [R78.81, B95.62]  Yes   • Type 2 diabetes mellitus ([facility]) [E11.9]  Yes   • HCAP (healthcare-associated pneumonia) [J18.9]  Yes   • Seizure disorder ([facility]) [G40.909]  Yes   • Hypernatremia [E87.0]  Yes   • Elevated troponin [R77.8]  Yes   • Acute on chronic diastolic heart failure ([facility]) [I50.33]  Yes   • Chronic anticoagulation [Z79.01]  Not Applicable   • Oropharyngeal dysphagia [R13.12]  Unknown   • Metabolic encephalopathy [G93.41]  Yes      Resolved Hospital  Problems    Diagnosis Date Resolved POA   • Hypercalcemia [E83.52] 09/23/2021 Yes   • Hyperkalemia [E87.5] 09/21/2021 Yes   • TI (acute kidney injury) (HCC) [N17.9] 09/23/2021 Yes   • High anion gap metabolic acidosis [E87.2] 09/23/2021 Yes   • Sepsis with acute organ dysfunction (HCC) [A41.9, R65.20] 09/23/2021 Yes        Brief Hospital Course to date:  Servando Kapadia is a 61 y.o. male with history of stroke with left-sided hemiparesis, type 2 diabetes, A. fib, seizure disorder who was sent in from his nursing home for shortness of breath.  He was admitted with septic shock due to MRSA bacteremia and pneumonia.     Discussion/plan:  IR reposition PICC.  X-ray images reviewed and appropriately placed currently on images.  Cleared to use PICC.  Replace phosphorus today.  Continue to trend worsening anemia.  Vancomycin as dosed by pharmacy.  Continue to monitor levels.  Plan is for 4 weeks IV treatment.  Repeat laboratory studies tomorrow.  PEG tube delayed until family consents.    Dysphagia  -Received Dobbhoff tube feeding.  Speech therapy recommends remain n.p.o.  -Dr. Bansal discussed case with brother and sister in law.  They want to proceed with PEG tube.   -GI consulted for placement, noted Eliquis held for procedure     Septic shock due to MRSA bacteremia pneumonia  -On IV Vanc, plan for 4 weeks total from negative blood culture (end 10/21)  -TTE complete but not the most diagnostic as was technically difficult study.       Seizure disorder on fosphenytoin   -Neurology was concerned Keppra contributed to sedation.  Keppra was discontinued and Dilantin increased.     ARF/hyponatremia/acidosis   -Resolved     Elevated troponin secondary to renal disease/probable type II MI  -No global dyskinesia-hypokinesis with EF 65% on 9/21/2021 echo     Type 2 diabetes  -Issues with hypoglycemia earlier in admission have resolved  -Continue sliding scale     Past history of CVA with resulting immobility on  Eliquis .     Metabolic encephalopathy  -Mental status is slowly improving.  He remains very dysarthric, but follows commands, answers yes or no questions     HTN off meds with improving hypotension on midodrine  -Home blood pressure medicines held on admission due to shock.  Currently requiring midodrine 5 mg 3 times daily.  Wean as tolerated        · DVT prophylaxis , Eliquis held in anticipation of procedure.    SCDs for now  · DNR.  · Discussed with patient and nursing  · Anticipate discharge to SNU facility timing yet to be determined.         CODE STATUS:   Code Status and Medical Interventions:   Ordered at: 09/19/21 2134     Limited Support to NOT Include:    Antiarrhythmic Drugs    Cardioversion/Defibrillation    Dialysis    Intubation    NIPPV (Non-Invasive Positive Pressure Support)    Vasopressors     Level Of Support Discussed With:    Next of Kin (If No Surrogate)     Code Status:    No CPR     Medical Interventions (Level of Support Prior to Arrest):    Limited       Jacoby Triana MD  10/02/21

## 2021-10-02 NOTE — PLAN OF CARE
Problem: Adult Inpatient Plan of Care  Goal: Plan of Care Review  Outcome: Ongoing, Progressing  Flowsheets  Taken 10/2/2021 1412 by Seven Bejarano RN  Progress: no change  Outcome Summary: Spoke with family today.  Explained PEG tube procedure again with family.  Educated about patient condition, outcomes, and future goal directed care. Questions answered from family.  VSS, q2turn, no BM today but very large one yesterday.  He denies pain. Beyer care addressed.  Bath given by aid and two student nurses.  No change in medications.  NPO at midnight tonight.  Taken 10/1/2021 0440 by Ilene Amor RN  Plan of Care Reviewed With: patient  Goal: Patient-Specific Goal (Individualized)  Outcome: Ongoing, Progressing  Goal: Absence of Hospital-Acquired Illness or Injury  Outcome: Ongoing, Progressing  Intervention: Identify and Manage Fall Risk  Recent Flowsheet Documentation  Taken 10/2/2021 1200 by Seven Bejarano RN  Safety Promotion/Fall Prevention:   activity supervised   clutter free environment maintained   fall prevention program maintained   muscle strengthening facilitated   nonskid shoes/slippers when out of bed   room organization consistent   toileting scheduled   safety round/check completed  Taken 10/2/2021 0818 by Seven Bejarano RN  Safety Promotion/Fall Prevention:   activity supervised   clutter free environment maintained   fall prevention program maintained   muscle strengthening facilitated   nonskid shoes/slippers when out of bed   room organization consistent   safety round/check completed   toileting scheduled  Intervention: Prevent Skin Injury  Recent Flowsheet Documentation  Taken 10/2/2021 1200 by Seven Bejarano, RN  Body Position:   supine   turned  Skin Protection: incontinence pads utilized  Taken 10/2/2021 1005 by Seven Bejarano, RN  Body Position: tilted, left  Taken 10/2/2021 0818 by Seven Bejarano RN  Body Position:   turned   tilted, right  Skin Protection: incontinence pads  utilized  Intervention: Prevent and Manage VTE (venous thromboembolism) Risk  Recent Flowsheet Documentation  Taken 10/2/2021 0818 by Seven Bejarano RN  VTE Prevention/Management: (AC on hold, per PEG placement ) --  Intervention: Prevent Infection  Recent Flowsheet Documentation  Taken 10/2/2021 1200 by Seven Bejarano RN  Infection Prevention:   cohorting utilized   personal protective equipment utilized   single patient room provided   rest/sleep promoted   hand hygiene promoted   equipment surfaces disinfected   environmental surveillance performed  Taken 10/2/2021 1005 by Seven Bejarano RN  Infection Prevention:   cohorting utilized   environmental surveillance performed   equipment surfaces disinfected   personal protective equipment utilized   rest/sleep promoted   single patient room provided   hand hygiene promoted  Taken 10/2/2021 0818 by Seven Bejarano RN  Infection Prevention:   cohorting utilized   environmental surveillance performed   equipment surfaces disinfected   hand hygiene promoted   rest/sleep promoted   single patient room provided   personal protective equipment utilized  Goal: Optimal Comfort and Wellbeing  Outcome: Ongoing, Progressing  Intervention: Provide Person-Centered Care  Recent Flowsheet Documentation  Taken 10/2/2021 0818 by Seven Bejarano RN  Trust Relationship/Rapport:   care explained   choices provided   questions answered   questions encouraged   reassurance provided   thoughts/feelings acknowledged  Goal: Readiness for Transition of Care  Outcome: Ongoing, Progressing   Goal Outcome Evaluation:           Progress: no change  Outcome Summary: Spoke with family today.  Explained PEG tube procedure again with family.  Educated about patient condition, outcomes, and future goal directed care. Questions answered from family.  VSS, q2turn, no BM today but very large one yesterday.  He denies pain. Beyer care addressed.  Bath given by aid and two student nurses.  No change in  medications.  NPO at midnight tonight.

## 2021-10-03 LAB
ANION GAP SERPL CALCULATED.3IONS-SCNC: 6.2 MMOL/L (ref 5–15)
BUN SERPL-MCNC: 17 MG/DL (ref 8–23)
BUN/CREAT SERPL: 43.6 (ref 7–25)
CALCIUM SPEC-SCNC: 8.6 MG/DL (ref 8.6–10.5)
CHLORIDE SERPL-SCNC: 106 MMOL/L (ref 98–107)
CO2 SERPL-SCNC: 27.8 MMOL/L (ref 22–29)
CREAT SERPL-MCNC: 0.39 MG/DL (ref 0.76–1.27)
DEPRECATED RDW RBC AUTO: 47.7 FL (ref 37–54)
ERYTHROCYTE [DISTWIDTH] IN BLOOD BY AUTOMATED COUNT: 15.3 % (ref 12.3–15.4)
FERRITIN SERPL-MCNC: 188 NG/ML (ref 30–400)
GFR SERPL CREATININE-BSD FRML MDRD: >150 ML/MIN/1.73
GFR SERPL CREATININE-BSD FRML MDRD: >150 ML/MIN/1.73
GLUCOSE BLDC GLUCOMTR-MCNC: 100 MG/DL (ref 70–130)
GLUCOSE BLDC GLUCOMTR-MCNC: 106 MG/DL (ref 70–130)
GLUCOSE BLDC GLUCOMTR-MCNC: 136 MG/DL (ref 70–130)
GLUCOSE BLDC GLUCOMTR-MCNC: 137 MG/DL (ref 70–130)
GLUCOSE SERPL-MCNC: 86 MG/DL (ref 65–99)
HCT VFR BLD AUTO: 23.4 % (ref 37.5–51)
HGB BLD-MCNC: 7.6 G/DL (ref 13–17.7)
IRON 24H UR-MRATE: 37 MCG/DL (ref 59–158)
IRON SATN MFR SERPL: 35 % (ref 20–50)
MAGNESIUM SERPL-MCNC: 1.5 MG/DL (ref 1.6–2.4)
MCH RBC QN AUTO: 27.5 PG (ref 26.6–33)
MCHC RBC AUTO-ENTMCNC: 32.5 G/DL (ref 31.5–35.7)
MCV RBC AUTO: 84.8 FL (ref 79–97)
PHOSPHATE SERPL-MCNC: 2.2 MG/DL (ref 2.5–4.5)
PLATELET # BLD AUTO: 268 10*3/MM3 (ref 140–450)
PMV BLD AUTO: 9.7 FL (ref 6–12)
POTASSIUM SERPL-SCNC: 4.1 MMOL/L (ref 3.5–5.2)
RBC # BLD AUTO: 2.76 10*6/MM3 (ref 4.14–5.8)
RETICS # AUTO: 0.07 10*6/MM3 (ref 0.02–0.13)
RETICS/RBC NFR AUTO: 2.48 % (ref 0.7–1.9)
SARS-COV-2 RNA PNL SPEC NAA+PROBE: NOT DETECTED
SODIUM SERPL-SCNC: 140 MMOL/L (ref 136–145)
TIBC SERPL-MCNC: 106 MCG/DL (ref 298–536)
TRANSFERRIN SERPL-MCNC: 71 MG/DL (ref 200–360)
WBC # BLD AUTO: 8.62 10*3/MM3 (ref 3.4–10.8)

## 2021-10-03 PROCEDURE — 85045 AUTOMATED RETICULOCYTE COUNT: CPT | Performed by: INTERNAL MEDICINE

## 2021-10-03 PROCEDURE — 87635 SARS-COV-2 COVID-19 AMP PRB: CPT | Performed by: INTERNAL MEDICINE

## 2021-10-03 PROCEDURE — 80048 BASIC METABOLIC PNL TOTAL CA: CPT | Performed by: INTERNAL MEDICINE

## 2021-10-03 PROCEDURE — 85027 COMPLETE CBC AUTOMATED: CPT | Performed by: INTERNAL MEDICINE

## 2021-10-03 PROCEDURE — 25010000002 VANCOMYCIN PER 500 MG: Performed by: INTERNAL MEDICINE

## 2021-10-03 PROCEDURE — 83540 ASSAY OF IRON: CPT | Performed by: INTERNAL MEDICINE

## 2021-10-03 PROCEDURE — 99232 SBSQ HOSP IP/OBS MODERATE 35: CPT | Performed by: INTERNAL MEDICINE

## 2021-10-03 PROCEDURE — 82962 GLUCOSE BLOOD TEST: CPT

## 2021-10-03 PROCEDURE — 83735 ASSAY OF MAGNESIUM: CPT | Performed by: INTERNAL MEDICINE

## 2021-10-03 PROCEDURE — 84100 ASSAY OF PHOSPHORUS: CPT | Performed by: INTERNAL MEDICINE

## 2021-10-03 PROCEDURE — 25010000002 MAGNESIUM SULFATE 2 GM/50ML SOLUTION: Performed by: INTERNAL MEDICINE

## 2021-10-03 PROCEDURE — 25010000002 FOSPHENYTOIN 100 MG PE/2ML SOLUTION: Performed by: HOSPITALIST

## 2021-10-03 PROCEDURE — 84466 ASSAY OF TRANSFERRIN: CPT | Performed by: INTERNAL MEDICINE

## 2021-10-03 PROCEDURE — 82728 ASSAY OF FERRITIN: CPT | Performed by: INTERNAL MEDICINE

## 2021-10-03 RX ORDER — PHENYTOIN SODIUM 100 MG/1
100 CAPSULE, EXTENDED RELEASE ORAL EVERY 12 HOURS SCHEDULED
Status: DISCONTINUED | OUTPATIENT
Start: 2021-10-03 | End: 2021-10-03

## 2021-10-03 RX ORDER — MIRTAZAPINE 15 MG/1
15 TABLET, FILM COATED ORAL NIGHTLY
Status: DISCONTINUED | OUTPATIENT
Start: 2021-10-03 | End: 2021-10-07 | Stop reason: HOSPADM

## 2021-10-03 RX ORDER — ATORVASTATIN CALCIUM 20 MG/1
40 TABLET, FILM COATED ORAL NIGHTLY
Status: DISCONTINUED | OUTPATIENT
Start: 2021-10-03 | End: 2021-10-07 | Stop reason: HOSPADM

## 2021-10-03 RX ORDER — MAGNESIUM SULFATE HEPTAHYDRATE 40 MG/ML
2 INJECTION, SOLUTION INTRAVENOUS AS NEEDED
Status: DISCONTINUED | OUTPATIENT
Start: 2021-10-03 | End: 2021-10-07 | Stop reason: HOSPADM

## 2021-10-03 RX ORDER — CHOLECALCIFEROL (VITAMIN D3) 125 MCG
1000 CAPSULE ORAL DAILY
Status: DISCONTINUED | OUTPATIENT
Start: 2021-10-03 | End: 2021-10-07 | Stop reason: HOSPADM

## 2021-10-03 RX ORDER — MELATONIN
1000 DAILY
Status: DISCONTINUED | OUTPATIENT
Start: 2021-10-03 | End: 2021-10-07 | Stop reason: HOSPADM

## 2021-10-03 RX ORDER — BACLOFEN 10 MG/1
5 TABLET ORAL 2 TIMES DAILY
Status: DISCONTINUED | OUTPATIENT
Start: 2021-10-03 | End: 2021-10-07 | Stop reason: HOSPADM

## 2021-10-03 RX ORDER — DIPHENOXYLATE HYDROCHLORIDE AND ATROPINE SULFATE 2.5; .025 MG/1; MG/1
1 TABLET ORAL DAILY
Status: DISCONTINUED | OUTPATIENT
Start: 2021-10-03 | End: 2021-10-07 | Stop reason: HOSPADM

## 2021-10-03 RX ORDER — MAGNESIUM SULFATE HEPTAHYDRATE 40 MG/ML
4 INJECTION, SOLUTION INTRAVENOUS AS NEEDED
Status: DISCONTINUED | OUTPATIENT
Start: 2021-10-03 | End: 2021-10-07 | Stop reason: HOSPADM

## 2021-10-03 RX ORDER — FUROSEMIDE 20 MG/1
10 TABLET ORAL DAILY
Status: DISCONTINUED | OUTPATIENT
Start: 2021-10-03 | End: 2021-10-07 | Stop reason: HOSPADM

## 2021-10-03 RX ADMIN — SODIUM CHLORIDE, PRESERVATIVE FREE 10 ML: 5 INJECTION INTRAVENOUS at 21:16

## 2021-10-03 RX ADMIN — MIDODRINE HYDROCHLORIDE 5 MG: 5 TABLET ORAL at 17:08

## 2021-10-03 RX ADMIN — SODIUM CHLORIDE, PRESERVATIVE FREE 10 ML: 5 INJECTION INTRAVENOUS at 21:19

## 2021-10-03 RX ADMIN — MAGNESIUM SULFATE HEPTAHYDRATE 2 G: 40 INJECTION, SOLUTION INTRAVENOUS at 08:41

## 2021-10-03 RX ADMIN — MIRTAZAPINE 15 MG: 15 TABLET, FILM COATED ORAL at 21:16

## 2021-10-03 RX ADMIN — SODIUM BICARBONATE 1300 MG: 650 TABLET ORAL at 08:58

## 2021-10-03 RX ADMIN — SODIUM BICARBONATE 1300 MG: 650 TABLET ORAL at 17:07

## 2021-10-03 RX ADMIN — Medication 1000 UNITS: at 18:06

## 2021-10-03 RX ADMIN — FOSPHENYTOIN SODIUM 100 MG PE: 50 INJECTION, SOLUTION INTRAMUSCULAR; INTRAVENOUS at 23:42

## 2021-10-03 RX ADMIN — SODIUM BICARBONATE 1300 MG: 650 TABLET ORAL at 21:16

## 2021-10-03 RX ADMIN — BACLOFEN 5 MG: 10 TABLET ORAL at 21:16

## 2021-10-03 RX ADMIN — MAGNESIUM SULFATE HEPTAHYDRATE 2 G: 40 INJECTION, SOLUTION INTRAVENOUS at 14:44

## 2021-10-03 RX ADMIN — Medication 1 TABLET: at 18:06

## 2021-10-03 RX ADMIN — MIDODRINE HYDROCHLORIDE 5 MG: 5 TABLET ORAL at 11:46

## 2021-10-03 RX ADMIN — SODIUM CHLORIDE, PRESERVATIVE FREE 10 ML: 5 INJECTION INTRAVENOUS at 08:58

## 2021-10-03 RX ADMIN — MIDODRINE HYDROCHLORIDE 5 MG: 5 TABLET ORAL at 06:14

## 2021-10-03 RX ADMIN — MAGNESIUM SULFATE HEPTAHYDRATE 2 G: 40 INJECTION, SOLUTION INTRAVENOUS at 12:54

## 2021-10-03 RX ADMIN — MAGNESIUM OXIDE 400 MG (241.3 MG MAGNESIUM) TABLET 400 MG: TABLET at 18:06

## 2021-10-03 RX ADMIN — Medication 1000 MCG: at 18:06

## 2021-10-03 RX ADMIN — VANCOMYCIN HYDROCHLORIDE 1000 MG: 1 INJECTION, SOLUTION INTRAVENOUS at 11:46

## 2021-10-03 RX ADMIN — FOSPHENYTOIN SODIUM 100 MG PE: 50 INJECTION, SOLUTION INTRAMUSCULAR; INTRAVENOUS at 08:45

## 2021-10-03 RX ADMIN — ATORVASTATIN CALCIUM 40 MG: 20 TABLET, FILM COATED ORAL at 21:16

## 2021-10-03 RX ADMIN — FUROSEMIDE 10 MG: 20 TABLET ORAL at 17:08

## 2021-10-03 RX ADMIN — FOSPHENYTOIN SODIUM 100 MG PE: 50 INJECTION, SOLUTION INTRAMUSCULAR; INTRAVENOUS at 17:08

## 2021-10-03 NOTE — PROGRESS NOTES
[facility] Medicine Services  PROGRESS NOTE    Patient Name: Servando Kapadia  : 1960  MRN: [id number]    Date of Admission: 2021  Primary Care Physician: No primary care provider on file.    Subjective   Subjective     CC:  Follow-up pneumonia    HPI:  Patient resting comfortable.  No new events reported     Review of Systems  Limited due to mental status.    Objective   Objective     Vital Signs:   Temp:  [96.6 °F (35.9 °C)-98.2 °F (36.8 °C)] 97.4 °F (36.3 °C)  Heart Rate:  [67-83] 69  Resp:  [16-18] 18  BP: ()/(60-86) 102/80        Physical Exam:  Constitutional:Awake, alert, chronically ill-appearing  HENT: NCAT, mucous membranes moist, neck supple  Respiratory: Occasional coarse breath sounds, normal pulmonary effort and no respiratory distress  Cardiovascular: RRR, normal radial pulses  Gastrointestinal: Positive bowel sounds, soft, nontender, nondistended  Musculoskeletal: Normal musculature for age, no lower extremity edema, BMI 25  Psychiatric: Calm affect, not conversational but occasional verbalization  Neurologic: Speaks occasional words, difficult to assess orientation, can follow some simple commands  Skin: No rashes or jaundice, warm      Results Reviewed:  Results from last 7 days   Lab Units 10/03/21  0618 10/02/21  0646 21  0556 21  0618 21  0629   WBC 10*3/mm3 8.62 8.88 8.99   < >  --    HEMOGLOBIN g/dL 7.6* 7.3* 7.9*   < >  --    HEMATOCRIT % 23.4* 22.4* 23.5*   < >  --    PLATELETS 10*3/mm3 268 236 225   < >  --    INR   --   --   --   --  1.31*    < > = values in this interval not displayed.     Results from last 7 days   Lab Units 10/03/21  0618 10/02/21  0647 21  0556   SODIUM mmol/L 140 139 140   POTASSIUM mmol/L 4.1 3.9 4.5   CHLORIDE mmol/L 106 105 109*   CO2 mmol/L 27.8 28.7 27.1   BUN mg/dL 17 17 15   CREATININE mg/dL 0.39* 0.37* 0.33*   GLUCOSE mg/dL 86 124* 90   CALCIUM mg/dL 8.6 8.4* 8.3*   ALT (SGPT) U/L  --  12  --    AST (SGOT) U/L   --  13  --      Estimated Creatinine Clearance: 192.4 mL/min (A) (by C-G formula based on SCr of 0.39 mg/dL (L)).    Microbiology Results Abnormal     Procedure Component Value - Date/Time    COVID PRE-OP / PRE-PROCEDURE SCREENING ORDER (NO ISOLATION) - Swab, Nasopharynx [[id number]]  (Normal) Collected: 10/03/21 0824    Lab Status: Final result Specimen: Swab from Nasopharynx Updated: 10/03/21 0955    Narrative:      The following orders were created for panel order COVID PRE-OP / PRE-PROCEDURE SCREENING ORDER (NO ISOLATION) - Swab, Nasopharynx.  Procedure                               Abnormality         Status                     ---------                               -----------         ------                     COVID-19,[facility] IN-HOUSE...[[id number]]  Normal              Final result                 Please view results for these tests on the individual orders.    COVID-19,[facility]-HOUSE CEPHEID/SANDRA NP SWAB IN TRANSPORT MEDIA 8-12 HR TAT - Swab, Nasopharynx [[id number]]  (Normal) Collected: 10/03/21 0824    Lab Status: Final result Specimen: Swab from Nasopharynx Updated: 10/03/21 0955     COVID19 Not Detected    Narrative:      Fact sheet for providers: https://www.fda.gov/media/919288/download    Fact sheet for patients: https://www.fda.gov/media/198502/download    Test performed by PCR.    Blood Culture - Blood, Arm, Left [[id number]] Collected: 09/23/21 1322    Lab Status: Final result Specimen: Blood from Arm, Left Updated: 09/28/21 1346     Blood Culture No growth at 5 days    Blood Culture - Blood, Blood, PICC Line [[id number]] Collected: 09/23/21 0240    Lab Status: Final result Specimen: Blood, PICC Line Updated: 09/28/21 0301     Blood Culture No growth at 5 days    Blood Culture - Blood, Arm, Right [[id number]] Collected: 09/19/21 1640    Lab Status: Final result Specimen: Blood from Arm, Right Updated: 09/24/21 1700     Blood Culture No growth at 5 days    Respiratory Panel PCR w/COVID-19(SARS-CoV-2)  NADEGE/GARETH/SHARIF/PAD/COR/MAD/BRENNEN In-House, NP Swab in UTM/VTM, 3-4 HR TAT - Swab, Nasopharynx [[id number]]  (Normal) Collected: 09/19/21 1648    Lab Status: Final result Specimen: Swab from Nasopharynx Updated: 09/19/21 0342     ADENOVIRUS, PCR Not Detected     Coronavirus 229E Not Detected     Coronavirus HKU1 Not Detected     Coronavirus NL63 Not Detected     Coronavirus OC43 Not Detected     COVID19 Not Detected     Human Metapneumovirus Not Detected     Human Rhinovirus/Enterovirus Not Detected     Influenza A PCR Not Detected     Influenza B PCR Not Detected     Parainfluenza Virus 1 Not Detected     Parainfluenza Virus 2 Not Detected     Parainfluenza Virus 3 Not Detected     Parainfluenza Virus 4 Not Detected     RSV, PCR Not Detected     Bordetella pertussis pcr Not Detected     Bordetella parapertussis PCR Not Detected     Chlamydophila pneumoniae PCR Not Detected     Mycoplasma pneumo by PCR Not Detected    Narrative:      In the setting of a positive respiratory panel with a viral infection PLUS a negative procalcitonin without other underlying concern for bacterial infection, consider observing off antibiotics or discontinuation of antibiotics and continue supportive care. If the respiratory panel is positive for atypical bacterial infection (Bordetella pertussis, Chlamydophila pneumoniae, or Mycoplasma pneumoniae), consider antibiotic de-escalation to target atypical bacterial infection.          Imaging Results (Last 24 Hours)     ** No results found for the last 24 hours. **          Results for orders placed during the hospital encounter of 09/19/21    Adult Transthoracic Echo Complete W/ Cont if Necessary Per Protocol    Interpretation Summary  · Estimated left ventricular EF = 65% Left ventricular systolic function is normal.  · The left ventricular cavity is small in size.  · Left ventricular diastolic function was normal.  · Very technically difficult study with limited views available      I have  reviewed the medications:  Scheduled Meds:ceFAZolin, 1 g, Intravenous, Once  ceFAZolin, 1 g, Intravenous, Once  fosphenytoin, 100 mg PE, Intravenous, Q8H  insulin lispro, 0-9 Units, Subcutaneous, TID AC  midodrine, 5 mg, Nasogastric, TID AC  sodium bicarbonate, 1,300 mg, Nasogastric, TID  sodium chloride, 10 mL, Intravenous, Q12H  sodium chloride, 10 mL, Intravenous, Q12H  vancomycin, 1,000 mg, Intravenous, Q24H      Continuous Infusions:Pharmacy Consult,   Pharmacy to dose vancomycin,       PRN Meds:.•  acetaminophen  •  acetaminophen  •  bisacodyl  •  dextrose  •  dextrose  •  glucagon (human recombinant)  •  magnesium sulfate **OR** magnesium sulfate **OR** magnesium sulfate  •  nitroglycerin  •  ondansetron **OR** ondansetron  •  Pharmacy Consult  •  Pharmacy to dose vancomycin  •  potassium chloride  •  potassium phosphate infusion greater than 15 mMoles **OR** potassium phosphate infusion greater than 15 mMoles **OR** potassium phosphate **OR** sodium phosphate IVPB **OR** sodium phosphate IVPB  •  [COMPLETED] Insert peripheral IV **AND** sodium chloride  •  sodium chloride  •  sodium chloride  •  sodium chloride    Assessment/Plan   Assessment & Plan     Active Hospital Problems    Diagnosis  POA   • **MRSA pneumonia ([facility]) [J15.212]  Yes   • MRSA bacteremia [R78.81, B95.62]  Yes   • Type 2 diabetes mellitus ([facility]) [E11.9]  Yes   • HCAP (healthcare-associated pneumonia) [J18.9]  Yes   • Seizure disorder ([facility]) [G40.909]  Yes   • Hypernatremia [E87.0]  Yes   • Elevated troponin [R77.8]  Yes   • Acute on chronic diastolic heart failure ([facility]) [I50.33]  Yes   • Chronic anticoagulation [Z79.01]  Not Applicable   • Oropharyngeal dysphagia [R13.12]  Unknown   • Metabolic encephalopathy [G93.41]  Yes      Resolved Hospital Problems    Diagnosis Date Resolved POA   • Hypercalcemia [E83.52] 09/23/2021 Yes   • Hyperkalemia [E87.5] 09/21/2021 Yes   • TI (acute kidney injury) ([facility]) [N17.9] 09/23/2021 Yes   • High anion gap  metabolic acidosis [E87.2] 09/23/2021 Yes   • Sepsis with acute organ dysfunction (HCC) [A41.9, R65.20] 09/23/2021 Yes        Brief Hospital Course to date:  Servando Kapadia is a 61 y.o. male with history of stroke with left-sided hemiparesis, type 2 diabetes, A. fib, seizure disorder who was sent in from his nursing home for shortness of breath.  He was admitted with septic shock due to MRSA bacteremia and pneumonia.     Discussion/plan:  Replace phosphorus and mag today.  Continue to trend anemia, stable.  Check iron studies.  Vancomycin as dosed by pharmacy, last level 18.  Continue to monitor levels.  Plan is for 4 weeks IV treatment.  Repeat laboratory studies tomorrow.  PEG tube delayed until family consents.    Dysphagia  -Received Dobbhoff tube feeding.  Speech therapy recommends remain n.p.o.  -Dr. Bansal discussed case with brother and sister in law.  They want to proceed with PEG tube.   -GI consulted for placement, noted Eliquis held for procedure     Septic shock due to MRSA bacteremia pneumonia  -On IV Vanc, plan for 4 weeks total from negative blood culture (end 10/21)  -TTE complete but not the most diagnostic as was technically difficult study.       Seizure disorder on fosphenytoin   -Neurology was concerned Keppra contributed to sedation.  Keppra was discontinued and Dilantin increased.     ARF/hyponatremia/acidosis   -Resolved     Elevated troponin secondary to renal disease/probable type II MI  -No global dyskinesia-hypokinesis with EF 65% on 9/21/2021 echo     Type 2 diabetes  -Issues with hypoglycemia earlier in admission have resolved  -Continue sliding scale     Past history of CVA with resulting immobility on Eliquis .     Metabolic encephalopathy  -Mental status is slowly improving.  He remains very dysarthric, but follows commands, answers yes or no questions     HTN off meds with improving hypotension on midodrine  -Home blood pressure medicines held on admission due to shock.  Currently  requiring midodrine 5 mg 3 times daily.  Wean as tolerated        · DVT prophylaxis , Eliquis held in anticipation of procedure.    SCDs for now  · DNR.  · Discussed with patient and nursing  · Anticipate discharge to SNU facility timing yet to be determined.         CODE STATUS:   Code Status and Medical Interventions:   Ordered at: 09/19/21 2134     Limited Support to NOT Include:    Antiarrhythmic Drugs    Cardioversion/Defibrillation    Dialysis    Intubation    NIPPV (Non-Invasive Positive Pressure Support)    Vasopressors     Level Of Support Discussed With:    Next of Kin (If No Surrogate)     Code Status:    No CPR     Medical Interventions (Level of Support Prior to Arrest):    Limited       Jacoby Triana MD  10/03/21

## 2021-10-03 NOTE — PLAN OF CARE
Goal Outcome Evaluation:           Progress: no change  Outcome Summary: continueing q2 turns. tube feed, pt alert to self. continued restraints for risk of pulling cortrack. planning for peg tube placement today. will continue care.

## 2021-10-03 NOTE — PROGRESS NOTES
"  Infectious Diseases Progress Note    Tricia Bose MD     [facility]  Los: 13 days  Patient Identification:  Name: Servando Kapadia  Age: 61 y.o.  Sex: male  :  1960  MRN: [id number]         Primary Care Physician: No primary care provider on file.            Subjective: No change and attempts to interact but sometimes hard to understand.  Interval History: See consultation note.    Objective:    Scheduled Meds:ceFAZolin, 1 g, Intravenous, Once  [START ON 10/3/2021] ceFAZolin, 1 g, Intravenous, Once  fosphenytoin, 100 mg PE, Intravenous, Q8H  insulin lispro, 0-9 Units, Subcutaneous, TID AC  midodrine, 5 mg, Nasogastric, TID AC  sodium bicarbonate, 1,300 mg, Nasogastric, TID  sodium chloride, 10 mL, Intravenous, Q12H  sodium chloride, 10 mL, Intravenous, Q12H  vancomycin, 1,000 mg, Intravenous, Q24H      Continuous Infusions:Pharmacy Consult,   Pharmacy to dose vancomycin,         Vital signs in last 24 hours:  Temp:  [95.7 °F (35.4 °C)-98.5 °F (36.9 °C)] 97.3 °F (36.3 °C)  Heart Rate:  [71-85] 74  Resp:  [16-18] 18  BP: (82-96)/(58-76) 92/60    Intake/Output:    Intake/Output Summary (Last 24 hours) at 10/2/2021 2251  Last data filed at 10/2/2021 2019  Gross per 24 hour   Intake 1490 ml   Output 1075 ml   Net 415 ml       Exam:  BP 92/60 (BP Location: Left arm, Patient Position: Lying)   Pulse 74   Temp 97.3 °F (36.3 °C) (Oral)   Resp 18   Ht 162.6 cm (64.02\")   Wt 70 kg (154 lb 5.2 oz)   SpO2 98%   BMI 26.48 kg/m²   Patient is examined using the personal protective equipment as per guidelines from infection control for this particular patient as enacted.  Hand washing was performed before and after patient interaction.  General Appearance: Chronically ill, awake, not answering questions, mumbles  Skin: warm and dry  HEENT: Oral mucosa is dry, nonicteric sclera, feeding tube in place  Neck: supple, no JVD  Lungs: Lateral rhonchi, unlabored breathing effort  Heart: RRR, normal S1 and S2, " no S3, no rub  Abdomen: soft, no guarding, normoactive bowel, left lower/flank area pain pump site appears to be not inflamed or having any drainage.  : no palpable bladder, Beyer catheter anchored  Extremities: Significant contractures  Neuro: Does not engage and interact and has sequelae of previous stroke with contractures of extremities.       Data Review:    I reviewed the patient's new clinical results.  Results from last 7 days   Lab Units 10/02/21  0646 09/30/21  0556 09/29/21  0453 09/28/21  0618 09/26/21  0644   WBC 10*3/mm3 8.88 8.99  --  8.38 7.75   HEMOGLOBIN g/dL 7.3* 7.9* 7.8* 8.2* 8.4*   PLATELETS 10*3/mm3 236 225  --  230 242     Results from last 7 days   Lab Units 10/02/21  0647 09/30/21  0556 09/29/21  1535 09/28/21  0618 09/27/21  0628 09/26/21  1244 09/26/21  0644   SODIUM mmol/L 139 140 139 142  --  146* 145   POTASSIUM mmol/L 3.9 4.5 4.0 4.2  --  3.7 4.0   CHLORIDE mmol/L 105 109* 110* 115*  --  122* 122*   CO2 mmol/L 28.7 27.1 25.5 21.4*  --  18.5* 17.3*   BUN mg/dL 17 15 14 11  --  9 10   CREATININE mg/dL 0.37* 0.33* 0.36* 0.34* 0.43* 0.43* 0.42*   CALCIUM mg/dL 8.4* 8.3* 8.1* 8.2*  --  8.8 8.7   GLUCOSE mg/dL 124* 90 133* 117*  --  73 64*       Microbiology Results (last 10 days)     Procedure Component Value - Date/Time    Blood Culture - Blood, Arm, Left [[id number]] Collected: 09/23/21 1322    Lab Status: Final result Specimen: Blood from Arm, Left Updated: 09/28/21 1346     Blood Culture No growth at 5 days    Blood Culture - Blood, Blood, PICC Line [[id number]] Collected: 09/23/21 0240    Lab Status: Final result Specimen: Blood, PICC Line Updated: 09/28/21 0301     Blood Culture No growth at 5 days          Assessment:    MRSA pneumonia (HCC)    Metabolic encephalopathy    Oropharyngeal dysphagia    Chronic anticoagulation    HCAP (healthcare-associated pneumonia)    Seizure disorder (HCC)    Hypernatremia    Elevated troponin    Acute on chronic diastolic heart failure (HCC)    Type  2 diabetes mellitus (HCC)    MRSA bacteremia  1-MRSA bacteremic sepsis likely secondary to healthcare associated pneumonia overall improved with current treatment.  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        Recommendations/Discussions:  · No further work-up for origin/source of and secondary seeding of MRSA bacteremia unless he shows objective evidence of clinical deterioration.    · Given his inability to participate in review of systems and inability to decide which organ system needs to be worked up for secondary seeding or source of MRSA bacteremia and his propensity for future recurrent hospitalizations I would recommend completing treatment of bacteremic MRSA sepsis with 4 weeks of IV vancomycin from last negative blood culture.    · Continue aggressive supportive care and prevent complications of immobility and dysphagia if possible.  · Prognosis is guarded to poor with risk of future recurrent hospitalization  · Plans for feeding tube noted.    Tricia Bose MD  10/2/2021  22:51 EDT    Much of this encounter note is an electronic transcription/translation of spoken language to printed text. The electronic translation of spoken language may permit erroneous, or at times, nonsensical words or phrases to be inadvertently transcribed; Although I have reviewed the note for such errors, some may still exist

## 2021-10-03 NOTE — PROGRESS NOTES
[facility]  Inpatient Progress Note    Reason for Follow Up:  Dysphagia    Subjective     Interval History:   Patient was to be on my schedule today for PEG tube placement.  I stop by to see the patient on the floor prior to heading to [facility] I noted that the patient's tube feeds were still running at 70 cc an hour apparently they were not turned off overnight.    Current Facility-Administered Medications:   •  acetaminophen (TYLENOL) suppository 650 mg, 650 mg, Rectal, Q6H PRN, Sunday Cornejo MD, 650 mg at 09/21/21 0528  •  acetaminophen (TYLENOL) tablet 650 mg, 650 mg, Per G Tube, Q6H PRN, Jacoby Theodore MD  •  bisacodyl (DULCOLAX) suppository 10 mg, 10 mg, Rectal, Daily PRN, Jacoby Theodore MD, 10 mg at 09/28/21 1714  •  ceFAZolin (ANCEF) IVPB 1 g, 1 g, Intravenous, Once, Lion Weber MD, Held at 10/01/21 1830  •  ceFAZolin (ANCEF) IVPB 1 g, 1 g, Intravenous, Once, Lion Weber MD, Held at 10/03/21 0857  •  dextrose (D50W) 25 g/ 50mL Intravenous Solution 25 g, 25 g, Intravenous, Q15 Min PRN, Sunday Cornejo MD, 25 g at 09/25/21 2208  •  dextrose (GLUTOSE) oral gel 15 g, 15 g, Oral, Q15 Min PRN, Sunday Cornejo MD  •  fosphenytoin (Cerebyx) injection 100 mg PE, 100 mg PE, Intravenous, Q8H, Sunday Cornejo MD, 100 mg PE at 10/03/21 0845  •  glucagon (human recombinant) (GLUCAGEN DIAGNOSTIC) injection 1 mg, 1 mg, Subcutaneous, Q15 Min PRN, Sunday Cornejo MD  •  insulin lispro (ADMELOG) injection 0-9 Units, 0-9 Units, Subcutaneous, TID AC, Sunday Cornejo MD, 2 Units at 09/27/21 0923  •  Magnesium Sulfate 2 gram Bolus, followed by 8 gram infusion (total Mg dose 10 grams)- Mg less than or equal to 1mg/dL, 2 g, Intravenous, PRN **OR** Magnesium Sulfate 2 gram / 50mL Infusion (GIVE X 3 BAGS TO EQUAL 6GM TOTAL DOSE) - Mg 1.1 - 1.5 mg/dl, 2 g, Intravenous, PRN, Last Rate: 25 mL/hr at 10/03/21 0841, 2 g at 10/03/21  0841 **OR** Magnesium Sulfate 4 gram infusion- Mg 1.6-1.9 mg/dL, 4 g, Intravenous, PRN, Jacoby Triana MD  •  midodrine (PROAMATINE) tablet 5 mg, 5 mg, Nasogastric, TID AC, Jacoby Theodore MD, 5 mg at 10/03/21 0614  •  nitroglycerin (NITROSTAT) SL tablet 0.4 mg, 0.4 mg, Sublingual, Q5 Min PRN, Jacoby Theodore MD  •  ondansetron (ZOFRAN) tablet 4 mg, 4 mg, Oral, Q6H PRN **OR** ondansetron (ZOFRAN) injection 4 mg, 4 mg, Intravenous, Q6H PRN, Jacoby Theodore MD  •  Pharmacy Consult, , Does not apply, Continuous PRN, Sunday Cornejo MD  •  Pharmacy to dose vancomycin, , Does not apply, Continuous PRN, Tricia Bose MD  •  potassium chloride 10 mEq in 100 mL IVPB, 10 mEq, Intravenous, Q1H PRN, Sunday Cornejo MD, Last Rate: 100 mL/hr at 09/21/21 1357, 10 mEq at 09/21/21 1357  •  potassium phosphate 45 mmol in sodium chloride 0.9 % 500 mL infusion, 45 mmol, Intravenous, PRN **OR** potassium phosphate 30 mmol in sodium chloride 0.9 % 250 mL infusion, 30 mmol, Intravenous, PRN **OR** potassium phosphate 15 mmol in sodium chloride 0.9 % 100 mL infusion, 15 mmol, Intravenous, PRN, 15 mmol at 09/30/21 0102 **OR** sodium phosphates 40 mmol in sodium chloride 0.9 % 500 mL IVPB, 40 mmol, Intravenous, PRN **OR** sodium phosphates 20 mmol in sodium chloride 0.9 % 250 mL IVPB, 20 mmol, Intravenous, PRN, Stephanie Bansal DO  •  sodium bicarbonate tablet 1,300 mg, 1,300 mg, Nasogastric, TID, Tyson Mata MD, 1,300 mg at 10/03/21 0858  •  [COMPLETED] Insert peripheral IV, , , Once **AND** sodium chloride 0.9 % flush 10 mL, 10 mL, Intravenous, PRN, Richard Hwang MD  •  sodium chloride 0.9 % flush 10 mL, 10 mL, Intravenous, Q12H, Jacoby Theodore MD, 10 mL at 10/03/21 0858  •  sodium chloride 0.9 % flush 10 mL, 10 mL, Intravenous, PRN, Jacoby Theodore MD  •  sodium chloride 0.9 % flush 10 mL, 10 mL, Intravenous, Q12H, Lubna Crawford, APRN, 10 mL at 10/02/21  2052  •  sodium chloride 0.9 % flush 10 mL, 10 mL, Intravenous, PRN, Lubna Crawford, MARY  •  sodium chloride 0.9 % flush 20 mL, 20 mL, Intravenous, PRN, Lubna Crawford APRN  •  vancomycin (VANCOCIN) in iso-osmotic dextrose IVPB 1 g (premix) 200 mL, 1,000 mg, Intravenous, Q24H, Tricia Bose MD, 1,000 mg at 10/02/21 0958  Review of Systems:    Review of systems could not be obtained due to  patient nonverbal.    Objective     Vital Signs  Temp:  [96.6 °F (35.9 °C)-98.2 °F (36.8 °C)] 98.2 °F (36.8 °C)  Heart Rate:  [70-85] 70  Resp:  [16-18] 18  BP: ()/(60-86) 100/86  Body mass index is 25.87 kg/m².    Intake/Output Summary (Last 24 hours) at 10/3/2021 0954  Last data filed at 10/3/2021 0858  Gross per 24 hour   Intake 130 ml   Output 1700 ml   Net -1570 ml     I/O this shift:  In: 30 [Other:30]  Out: -      Physical Exam:   General: patient awake, alert and cooperative   Abdomen: soft, nontender, nondistended; normal bowel sounds   Rectal: deferred   Extremities: no rash or edema     Results Review:     I reviewed the patient's new clinical results.    Results from last 7 days   Lab Units 10/03/21  0618 10/02/21  0646 09/30/21  0556   WBC 10*3/mm3 8.62 8.88 8.99   HEMOGLOBIN g/dL 7.6* 7.3* 7.9*   HEMATOCRIT % 23.4* 22.4* 23.5*   PLATELETS 10*3/mm3 268 236 225     Results from last 7 days   Lab Units 10/03/21  0618 10/02/21  0647 09/30/21  0556   SODIUM mmol/L 140 139 140   POTASSIUM mmol/L 4.1 3.9 4.5   CHLORIDE mmol/L 106 105 109*   CO2 mmol/L 27.8 28.7 27.1   BUN mg/dL 17 17 15   CREATININE mg/dL 0.39* 0.37* 0.33*   CALCIUM mg/dL 8.6 8.4* 8.3*   BILIRUBIN mg/dL  --  <0.2  --    ALK PHOS U/L  --  70  --    ALT (SGPT) U/L  --  12  --    AST (SGOT) U/L  --  13  --    GLUCOSE mg/dL 86 124* 90     Results from last 7 days   Lab Units 09/27/21  0629   INR  1.31*     No results found for: LIPASE      Assessment/Plan   Assessment:   1. Oropharyngeal dysphagia  2. Pneumonia    Plan:   Would not be able  to place PEG tube today as the patient's tube feeds were not discontinued overnight as ordered.  We will continue enteral feeding through the day I have asked the nurse to ensure that the tube feeds are turned off at midnight.  We will tentatively plan for PEG tube placement with Dr. Weber at his earliest availability.  I discussed the patients findings and my recommendations with patient.         Krzysztof Darling M.D.  [facility]  [address] [place] [address]  Office: [phone]

## 2021-10-04 ENCOUNTER — ANESTHESIA (OUTPATIENT)
Dept: GASTROENTEROLOGY | Facility: HOSPITAL | Age: 61
End: 2021-10-04

## 2021-10-04 ENCOUNTER — ANESTHESIA EVENT (OUTPATIENT)
Dept: GASTROENTEROLOGY | Facility: HOSPITAL | Age: 61
End: 2021-10-04

## 2021-10-04 ENCOUNTER — INPATIENT HOSPITAL (OUTPATIENT)
Dept: URBAN - METROPOLITAN AREA HOSPITAL 113 | Facility: HOSPITAL | Age: 61
End: 2021-10-04
Payer: MEDICAID

## 2021-10-04 DIAGNOSIS — R13.12 DYSPHAGIA, OROPHARYNGEAL PHASE: ICD-10-CM

## 2021-10-04 DIAGNOSIS — E11.9 TYPE 2 DIABETES MELLITUS WITHOUT COMPLICATIONS: ICD-10-CM

## 2021-10-04 DIAGNOSIS — G93.41 METABOLIC ENCEPHALOPATHY: ICD-10-CM

## 2021-10-04 DIAGNOSIS — B95.62 METHICILLIN RESISTANT STAPHYLOCOCCUS AUREUS INFECTION AS THE: ICD-10-CM

## 2021-10-04 DIAGNOSIS — I50.33 ACUTE ON CHRONIC DIASTOLIC (CONGESTIVE) HEART FAILURE: ICD-10-CM

## 2021-10-04 DIAGNOSIS — R79.89 OTHER SPECIFIED ABNORMAL FINDINGS OF BLOOD CHEMISTRY: ICD-10-CM

## 2021-10-04 DIAGNOSIS — R56.9 UNSPECIFIED CONVULSIONS: ICD-10-CM

## 2021-10-04 DIAGNOSIS — J15.212 PNEUMONIA DUE TO METHICILLIN RESISTANT STAPHYLOCOCCUS AUREUS: ICD-10-CM

## 2021-10-04 DIAGNOSIS — E87.1 HYPO-OSMOLALITY AND HYPONATREMIA: ICD-10-CM

## 2021-10-04 LAB
ALBUMIN SERPL-MCNC: 1.9 G/DL (ref 3.5–5.2)
ANION GAP SERPL CALCULATED.3IONS-SCNC: 5.1 MMOL/L (ref 5–15)
BUN SERPL-MCNC: 19 MG/DL (ref 8–23)
BUN/CREAT SERPL: 44.2 (ref 7–25)
CALCIUM SPEC-SCNC: 9.3 MG/DL (ref 8.6–10.5)
CHLORIDE SERPL-SCNC: 105 MMOL/L (ref 98–107)
CO2 SERPL-SCNC: 30.9 MMOL/L (ref 22–29)
CREAT SERPL-MCNC: 0.43 MG/DL (ref 0.76–1.27)
DEPRECATED RDW RBC AUTO: 45.9 FL (ref 37–54)
ERYTHROCYTE [DISTWIDTH] IN BLOOD BY AUTOMATED COUNT: 15.5 % (ref 12.3–15.4)
FOLATE SERPL-MCNC: 11.1 NG/ML (ref 4.78–24.2)
GFR SERPL CREATININE-BSD FRML MDRD: >150 ML/MIN/1.73
GFR SERPL CREATININE-BSD FRML MDRD: >150 ML/MIN/1.73
GLUCOSE BLDC GLUCOMTR-MCNC: 119 MG/DL (ref 70–130)
GLUCOSE BLDC GLUCOMTR-MCNC: 125 MG/DL (ref 70–130)
GLUCOSE BLDC GLUCOMTR-MCNC: 125 MG/DL (ref 70–130)
GLUCOSE BLDC GLUCOMTR-MCNC: 193 MG/DL (ref 70–130)
GLUCOSE BLDC GLUCOMTR-MCNC: 57 MG/DL (ref 70–130)
GLUCOSE SERPL-MCNC: 74 MG/DL (ref 65–99)
HCT VFR BLD AUTO: 26.4 % (ref 37.5–51)
HGB BLD-MCNC: 8.5 G/DL (ref 13–17.7)
INR PPP: 0.94 (ref 0.9–1.1)
MAGNESIUM SERPL-MCNC: 2.6 MG/DL (ref 1.6–2.4)
MCH RBC QN AUTO: 27.2 PG (ref 26.6–33)
MCHC RBC AUTO-ENTMCNC: 32.2 G/DL (ref 31.5–35.7)
MCV RBC AUTO: 84.3 FL (ref 79–97)
PHENYTOIN SERPL-MCNC: 11.3 MCG/ML (ref 10–20)
PHOSPHATE SERPL-MCNC: 2.9 MG/DL (ref 2.5–4.5)
PLATELET # BLD AUTO: 324 10*3/MM3 (ref 140–450)
PMV BLD AUTO: 9.8 FL (ref 6–12)
POTASSIUM SERPL-SCNC: 4.6 MMOL/L (ref 3.5–5.2)
PROTHROMBIN TIME: 12.4 SECONDS (ref 11.7–14.2)
RBC # BLD AUTO: 3.13 10*6/MM3 (ref 4.14–5.8)
SODIUM SERPL-SCNC: 141 MMOL/L (ref 136–145)
VANCOMYCIN TROUGH SERPL-MCNC: 10.6 MCG/ML (ref 5–20)
VIT B12 BLD-MCNC: 1484 PG/ML (ref 211–946)
WBC # BLD AUTO: 10.32 10*3/MM3 (ref 3.4–10.8)

## 2021-10-04 PROCEDURE — 25010000002 IRON SUCROSE PER 1 MG: Performed by: INTERNAL MEDICINE

## 2021-10-04 PROCEDURE — 80202 ASSAY OF VANCOMYCIN: CPT | Performed by: INTERNAL MEDICINE

## 2021-10-04 PROCEDURE — 25010000002 FOSPHENYTOIN 100 MG PE/2ML SOLUTION: Performed by: HOSPITALIST

## 2021-10-04 PROCEDURE — 85027 COMPLETE CBC AUTOMATED: CPT | Performed by: INTERNAL MEDICINE

## 2021-10-04 PROCEDURE — 85610 PROTHROMBIN TIME: CPT | Performed by: INTERNAL MEDICINE

## 2021-10-04 PROCEDURE — 82962 GLUCOSE BLOOD TEST: CPT

## 2021-10-04 PROCEDURE — 83735 ASSAY OF MAGNESIUM: CPT | Performed by: INTERNAL MEDICINE

## 2021-10-04 PROCEDURE — 82040 ASSAY OF SERUM ALBUMIN: CPT | Performed by: HOSPITALIST

## 2021-10-04 PROCEDURE — 82746 ASSAY OF FOLIC ACID SERUM: CPT | Performed by: INTERNAL MEDICINE

## 2021-10-04 PROCEDURE — 25010000002 VANCOMYCIN PER 500 MG: Performed by: INTERNAL MEDICINE

## 2021-10-04 PROCEDURE — 82607 VITAMIN B-12: CPT | Performed by: INTERNAL MEDICINE

## 2021-10-04 PROCEDURE — 80185 ASSAY OF PHENYTOIN TOTAL: CPT | Performed by: HOSPITALIST

## 2021-10-04 PROCEDURE — 84100 ASSAY OF PHOSPHORUS: CPT | Performed by: INTERNAL MEDICINE

## 2021-10-04 PROCEDURE — 99232 SBSQ HOSP IP/OBS MODERATE 35: CPT | Performed by: INTERNAL MEDICINE

## 2021-10-04 PROCEDURE — 80048 BASIC METABOLIC PNL TOTAL CA: CPT | Performed by: INTERNAL MEDICINE

## 2021-10-04 RX ORDER — ACETAMINOPHEN 325 MG/1
650 TABLET ORAL ONCE
Status: COMPLETED | OUTPATIENT
Start: 2021-10-04 | End: 2021-10-04

## 2021-10-04 RX ADMIN — MAGNESIUM OXIDE 400 MG (241.3 MG MAGNESIUM) TABLET 400 MG: TABLET at 09:10

## 2021-10-04 RX ADMIN — SODIUM BICARBONATE 1300 MG: 650 TABLET ORAL at 20:20

## 2021-10-04 RX ADMIN — MIRTAZAPINE 15 MG: 15 TABLET, FILM COATED ORAL at 20:19

## 2021-10-04 RX ADMIN — FOSPHENYTOIN SODIUM 100 MG PE: 50 INJECTION, SOLUTION INTRAMUSCULAR; INTRAVENOUS at 09:11

## 2021-10-04 RX ADMIN — SODIUM CHLORIDE, PRESERVATIVE FREE 10 ML: 5 INJECTION INTRAVENOUS at 20:20

## 2021-10-04 RX ADMIN — ACETAMINOPHEN 650 MG: 325 TABLET, FILM COATED ORAL at 09:11

## 2021-10-04 RX ADMIN — SODIUM BICARBONATE 1300 MG: 650 TABLET ORAL at 09:10

## 2021-10-04 RX ADMIN — FUROSEMIDE 10 MG: 20 TABLET ORAL at 09:11

## 2021-10-04 RX ADMIN — MIDODRINE HYDROCHLORIDE 5 MG: 5 TABLET ORAL at 16:37

## 2021-10-04 RX ADMIN — FOSPHENYTOIN SODIUM 100 MG PE: 50 INJECTION, SOLUTION INTRAMUSCULAR; INTRAVENOUS at 23:39

## 2021-10-04 RX ADMIN — DEXTROSE MONOHYDRATE 25 G: 500 INJECTION PARENTERAL at 06:29

## 2021-10-04 RX ADMIN — BACLOFEN 5 MG: 10 TABLET ORAL at 09:11

## 2021-10-04 RX ADMIN — MIDODRINE HYDROCHLORIDE 5 MG: 5 TABLET ORAL at 06:32

## 2021-10-04 RX ADMIN — Medication 1 TABLET: at 09:10

## 2021-10-04 RX ADMIN — VANCOMYCIN HYDROCHLORIDE 1000 MG: 1 INJECTION, SOLUTION INTRAVENOUS at 13:30

## 2021-10-04 RX ADMIN — SODIUM BICARBONATE 1300 MG: 650 TABLET ORAL at 16:37

## 2021-10-04 RX ADMIN — IRON SUCROSE 300 MG: 20 INJECTION, SOLUTION INTRAVENOUS at 10:08

## 2021-10-04 RX ADMIN — SODIUM CHLORIDE, PRESERVATIVE FREE 10 ML: 5 INJECTION INTRAVENOUS at 09:11

## 2021-10-04 RX ADMIN — ATORVASTATIN CALCIUM 40 MG: 20 TABLET, FILM COATED ORAL at 20:20

## 2021-10-04 RX ADMIN — Medication 1000 MCG: at 09:10

## 2021-10-04 RX ADMIN — MIDODRINE HYDROCHLORIDE 5 MG: 5 TABLET ORAL at 12:30

## 2021-10-04 RX ADMIN — BACLOFEN 5 MG: 10 TABLET ORAL at 20:20

## 2021-10-04 RX ADMIN — Medication 1000 UNITS: at 09:11

## 2021-10-04 RX ADMIN — FOSPHENYTOIN SODIUM 100 MG PE: 50 INJECTION, SOLUTION INTRAMUSCULAR; INTRAVENOUS at 16:37

## 2021-10-04 RX ADMIN — SODIUM CHLORIDE, PRESERVATIVE FREE 10 ML: 5 INJECTION INTRAVENOUS at 20:21

## 2021-10-04 NOTE — PROGRESS NOTES
[facility]  Clinical Pharmacy Department     Vancomycin Pharmacokinetic Note    Servando Kapadia is a 61 y.o. male who is on day 16 of pharmacy to dose vancomycin for sepsis.    Target level: AUC24 400-600  Consulting Provider:  Dr Cornejo  Current Vancomycin Dose:   1000 mg IV q24h  Planned Duration of Therapy: 10/21/21    Allergies  Allergies as of 09/19/2021   • (No Known Allergies)       Microbiology:  Microbiology Results (last 10 days)     Procedure Component Value - Date/Time    COVID PRE-OP / PRE-PROCEDURE SCREENING ORDER (NO ISOLATION) - Swab, Nasopharynx [[id number]]  (Normal) Collected: 10/03/21 0824    Lab Status: Final result Specimen: Swab from Nasopharynx Updated: 10/03/21 0955    Narrative:      The following orders were created for panel order COVID PRE-OP / PRE-PROCEDURE SCREENING ORDER (NO ISOLATION) - Swab, Nasopharynx.  Procedure                               Abnormality         Status                     ---------                               -----------         ------                     COVID-19,[facility]...[[id number]]  Normal              Final result                 Please view results for these tests on the individual orders.    COVID-19,[facility] CEPHEID/SANDRA NP SWAB IN TRANSPORT MEDIA 8-12 HR TAT - Swab, Nasopharynx [[id number]]  (Normal) Collected: 10/03/21 0824    Lab Status: Final result Specimen: Swab from Nasopharynx Updated: 10/03/21 0955     COVID19 Not Detected    Narrative:      Fact sheet for providers: https://www.fda.gov/media/308796/download    Fact sheet for patients: https://www.fda.gov/media/961444/download    Test performed by PCR.          Radiology/Imaging:  Adult Transthoracic Echo Complete W/ Cont if Necessary Per Protocol    Result Date: 9/21/2021  · Estimated left ventricular EF = 65% Left ventricular systolic function is normal. · The left ventricular cavity is small in size. · Left ventricular diastolic function was normal. · Very technically  difficult study with limited views available      EEG    Result Date: 9/29/2021  Date of onset: September 28, 2021 at 2:20 PM Date of offset: September 28, 2021 at 2:45 PM Indication: Seizures Technical description:  This is a 21 channel digital EEG recording that began on September 28, 2021 at 2:20 PM and ended on September 28, 2021 at 2:45 PM.  Seferino and seizure detection software programs were used. Background:  The EEG recording demonstrates mild diffuse background slowing with mainly theta frequencies.  7-8 Hz frequencies are present posteriorly and symmetrically.  The patient enters the drowsy state, and sleeps during the record.  Sleep activities are symmetric and contain sleep spindles and K complexes.  Hyperventilation and photic stimulation were not performed.  There are no asymmetries between the two hemispheres.  No interictal activity is present. The EKG monitor shows a heart rate that varies between 75 and 80 beats per minute. Clinical interpretation:  This routine EEG is abnormal due to the presence of mild diffuse background slowing.  The results of this study are nonspecific, but may indicate a mild encephalopathy, medication effect, or excessive drowsiness.  No potentially epileptogenic activity, seizure activity, or focal slowing is present.  Careful clinical correlation is advised.      CT Abdomen Pelvis Without Contrast    Result Date: 9/19/2021  CT CHEST WITHOUT CONTRAST; CT OF THE ABDOMEN AND PELVIS WITHOUT CONTRAST  HISTORY: Shortness of breath and lower abdominal pain  COMPARISON: 01/08/2017  TECHNIQUE: Axial CT imaging was obtained through the abdomen and pelvis. No IV contrast was administered.  FINDINGS: CT CHEST: Exam is degraded by motion artifact. Patchy areas of consolidation are seen within both lungs. Appearance is suspicious for multifocal pneumonia. The thyroid gland is within normal limits. Patient is noted to have some secretions layering dependently within the trachea. There  are extensive coronary artery calcifications. Esophagus is within normal limits. Shotty mediastinal lymph nodes are noted. Thoracic aorta measures within normal size limits. There is no pleural or pericardial effusion. Bilateral gynecomastia is noted. Patient apparently has a baclofen pump. There is some body wall edema.  CT OF THE ABDOMEN AND PELVIS: Examination is severely degraded by motion artifact, patient positioning, and lack of contrast material. No focal hepatic lesions are seen. Gallbladder appears distended, although no obvious stones or sludge are seen. The stomach and duodenum are unremarkable, as are the adrenal glands. Pancreas is atrophic. Spleen is within normal limits. There are bilateral renal stones. There is no hydronephrosis. No distal ureteral or bladder stones are seen. Prostate gland is probably enlarged. There is no evidence of mechanical small bowel obstruction. However, the patient is noted to have a large amount of stool within the rectosigmoid, concerning for fecal impaction. There is associated wall thickening, which may reflect proctocolitis. Sigmoid colon is very redundant, and demonstrates gaseous distention, although the proximal colon is relatively decompressed. There is a collection which is identified within the left anterior abdominal wall, measuring 6.2 x 3.1 cm. Bone marrow is heterogeneous, which may be related to osteoporosis. However bone scan would be more sensitive for assessment. Cm. Patient originally had a baclofen pump within this area. However, the generator appears to have been removed. Significance of this collection is not certain. It may represent hematoma following removal. Correlation with history is suggested.       1. Bilateral areas of consolidation within both lungs, suspicious for pneumonia. 2. Large volume of stool within the rectosigmoid, concerning for fecal impaction, with associated proctocolitis. There is some gaseous distention of the more proximal  sigmoid colon, although this was present on prior study. 3. There is a collection identified within the left anterior abdominal wall. This was the site of the patient's prior baclofen pump generator. The generator is no longer seen. Hematoma following removal would be a consideration, although correlation with clinical presentation is recommended.  Radiation dose reduction techniques were utilized, including automated exposure control and exposure modulation based on body size.  This report was finalized on 9/19/2021 8:51 PM by Dr. Marilynn Bermudez M.D.      CT Head Without Contrast    Result Date: 9/25/2021  Patient: KIP RODRIGUEZ  Time Out: 21:44 Exam(s): CT HEAD Without Contrast EXAM:   CT Head Without Intravenous Contrast CLINICAL HISTORY:    Reason for exam: Mental status change, unknown cause. lethargy, Hx of CVA in past. TECHNIQUE:   Axial computed tomography images of the head brain without intravenous contrast.  CTDI is 54.80 mGy and DLP is 974.40 mGy-cm.  This CT exam was performed according to the principle of ALARA (As Low As Reasonably Achievable) by using one or more of the following dose reduction techniques: automated exposure control, adjustment of the mA and or kV according to patient size, and or use of iterative reconstruction technique. COMPARISON:   1 7 2017. FINDINGS:   Brain:  Small vessel disease of aging.  Chronic lacunar infarct right thalamic area.  No abnormal extra-axial collection is noted.  No hemorrhage.   Midline shift:  Midline anatomy is unremarkable.   Ventricles:  There is prominence of the ventricular system, cortical sulci, basilar cisterns, compatible with age related atrophy.   Bones joints:  Unremarkable.  No acute fracture.   Soft tissues:  There is suggestion of a possible 4.5 x 1.2 cm scalp hematoma overlying the occipital region of the midline.  Clinical correlation is advised.  Similar finding is noted on the previous study.   Sinuses:  Unremarkable as visualized.   No acute sinusitis.   Mastoid air cells:  Unremarkable as visualized.  No mastoid effusion. IMPRESSION:     1.  Possible scalp hematoma at the occipital region of the midline similar to that noted on the previous study. 2.  Age-related atrophy and small vessel disease of aging. 3.  Chronic lacunar infarct right thalamic area. 4.  No acute intracranial pathology is detected. 5.  If there is concern for etiology such as early acute lacunar infarct, MR imaging of the brain with diffusion-weighted sequences should be performed. 6.  Clinical correlation is advised to assess for etiology such as normal pressure hydrocephalus.     Electronically signed by Josr Mixon MD on 09-25-21 at 2144    CT Chest Without Contrast Diagnostic    Result Date: 9/19/2021  CT CHEST WITHOUT CONTRAST; CT OF THE ABDOMEN AND PELVIS WITHOUT CONTRAST  HISTORY: Shortness of breath and lower abdominal pain  COMPARISON: 01/08/2017  TECHNIQUE: Axial CT imaging was obtained through the abdomen and pelvis. No IV contrast was administered.  FINDINGS: CT CHEST: Exam is degraded by motion artifact. Patchy areas of consolidation are seen within both lungs. Appearance is suspicious for multifocal pneumonia. The thyroid gland is within normal limits. Patient is noted to have some secretions layering dependently within the trachea. There are extensive coronary artery calcifications. Esophagus is within normal limits. Shotty mediastinal lymph nodes are noted. Thoracic aorta measures within normal size limits. There is no pleural or pericardial effusion. Bilateral gynecomastia is noted. Patient apparently has a baclofen pump. There is some body wall edema.  CT OF THE ABDOMEN AND PELVIS: Examination is severely degraded by motion artifact, patient positioning, and lack of contrast material. No focal hepatic lesions are seen. Gallbladder appears distended, although no obvious stones or sludge are seen. The stomach and duodenum are unremarkable, as are the  adrenal glands. Pancreas is atrophic. Spleen is within normal limits. There are bilateral renal stones. There is no hydronephrosis. No distal ureteral or bladder stones are seen. Prostate gland is probably enlarged. There is no evidence of mechanical small bowel obstruction. However, the patient is noted to have a large amount of stool within the rectosigmoid, concerning for fecal impaction. There is associated wall thickening, which may reflect proctocolitis. Sigmoid colon is very redundant, and demonstrates gaseous distention, although the proximal colon is relatively decompressed. There is a collection which is identified within the left anterior abdominal wall, measuring 6.2 x 3.1 cm. Bone marrow is heterogeneous, which may be related to osteoporosis. However bone scan would be more sensitive for assessment. Cm. Patient originally had a baclofen pump within this area. However, the generator appears to have been removed. Significance of this collection is not certain. It may represent hematoma following removal. Correlation with history is suggested.       1. Bilateral areas of consolidation within both lungs, suspicious for pneumonia. 2. Large volume of stool within the rectosigmoid, concerning for fecal impaction, with associated proctocolitis. There is some gaseous distention of the more proximal sigmoid colon, although this was present on prior study. 3. There is a collection identified within the left anterior abdominal wall. This was the site of the patient's prior baclofen pump generator. The generator is no longer seen. Hematoma following removal would be a consideration, although correlation with clinical presentation is recommended.  Radiation dose reduction techniques were utilized, including automated exposure control and exposure modulation based on body size.  This report was finalized on 9/19/2021 8:51 PM by Dr. Marilynn Bermudez M.D.      FL Video Swallow With Speech Single Contrast    Result  Date: 9/28/2021  VIDEO SWALLOWING EXAMINATION BY SPEECH PATHOLOGY  Clinical: Dysphasia  Video swallowing examination performed under the direction of speech pathology. Imaging reviewed by radiologist who concurs with the findings.  Speech pathology summary:Fed by SLP for all consistencies secondary to soft UE restraints present, trials included - HTL by tsp/straw, NTL by tsp/straw, thins by straw, puree, soft solids. Mastication is inefficient, incomplete bolus recollection. Lingual movements are decreased for propulsion, brisk. Bolus control is adequate. Base of tongue retraction weak with no entry into the nasopharynx. Anterior movement of hyoid complex mildly decreased, functional. Laryngeal elevation, epiglottic inversion adequate, achieve laryngeal vestibular closure in most opportunities. Premature spillage to the valleculae for HTL by straw, NTL by tsp, NTL by straw; pyriform for thin liquids by straw. Intermittently two swallows are required to clear the bolus (HTL by straw) - appearing most closely correlated to decreased and slow lingual movements. Minimal BOT and valleculae residuals remain with puree. Residual decreased to trace with liquid wash. Minimal/moderate oral residuals remain with soft solids secondary to ineffective oral phase. Aspiration before the swallow down posterior tracheal wall secondary to premature spillage with bolus head in pyriform, delayed airway closure for thin liquids by straw.. Additional deep penetration to the vocal folds, trace aspiration during the swallow x1, after the swallow x1 noted with small sips thins by straw. No spontaneous airway response appreciated. A cued cough can eject majority, not all of material. No additional incidents of penetration or aspiration were noted throughout.  FLUOROSCOPY TIME: 2 minutes 54 seconds, 4852 images.  This report was finalized on 9/28/2021 2:32 PM by Dr. Matt Guevara M.D.      XR Chest 1 View    Result Date:  9/19/2021  EXAMINATION: SINGLE VIEW CHEST RADIOGRAPH  HISTORY: 61-year-old male with a history of shortness of air.  FINDINGS: A semiupright AP chest radiograph was obtained. Comparison is made to a chest radiograph dated 01/07/2017. The lungs are normal in volume and are clear. The cardiomediastinal silhouette and pulmonary vasculature appear normal.      Negative chest radiograph.  This report was finalized on 9/19/2021 7:12 PM by Dr. Hill Estrada M.D.      IR reposition central line w fluoro    Result Date: 10/1/2021  PICC LINE REPOSITION  INDICATION: Malpositioned right PICC line  Total fluoroscopy time 9 seconds. 2 fluoroscopic images obtained.  TECHNIQUE: Maximum sterile barrier technique was used including sterile cap, mask, gloves, gown and large sterile sheet as well as pre-procedure hand washing and cutaneous antisepsis with 2 % chlorhexidine.   image obtained showing the existing right upper extremity PICC line with tip in the right internal jugular vein. A wire was advanced through the PICC line under fluoroscopic guidance. The PICC line was then retracted and advanced back into the lower SVC over the wire. There was good blood return from the PICC line and it flushed easily upon completion. Sterile dressing was applied. No immediate complications.      Successful right-sided PICC line repositioning over a guidewire  This report was finalized on 10/1/2021 4:44 PM by YI Kirkpatrick reposition central line w fluoro    Result Date: 9/23/2021  PERIPHERALLY INSERTED CENTRAL CATHETER REPOSITIONING  HISTORY:  PICC line needs repositioned; A41.9-Sepsis, unspecified organism; E86.0-Dehydration; N17.9-Acute kidney failure, unspecified;  PROCEDURE/FINDINGS: After the risks and benefits of the procedure were explained to the patient, informed consent was obtained. Patient was placed on the angiographytable in the supine position. The right arm and indwelling PICC line were cleaned, prepped  and draped in usual sterile manner. Maximum sterile barrier technique was used including sterile cap, mask, gloves, gown, and large sterile sheet as well as preprocedure handwashing and cutaneous antisepsis with 2% chlorhexidine. With the help of a guidewire the indwelling PICC line was repositioned so that its tip terminated in the upper right atrium. After wire removal, port aspiration and flushing the catheter was secured in place. The patient tolerated the procedure well, and there were no immediate complications. All elements of maximal sterile technique were followed. 0.7 minutes fluoroscopy time, 6 mGy, 1 exposure.       Successful repositioning of right upper extremity PICC.  This report was finalized on 9/23/2021 3:28 PM by Dr. David Bello M.D.      XR Abdomen KUB    Result Date: 9/23/2021  XR ABDOMEN KUB-  INDICATIONS: NG tube placement  TECHNIQUE: Frontal view of the abdomen  COMPARISON:  image from CT from 09/19/2021  FINDINGS:  NG tube extends to the distal stomach. The bowel gas pattern is nonobstructive. No supine evidence for free intraperitoneal gas. Moderate colonic fecal retention is apparent. Follow-up as clinically indicated.         As described.  This report was finalized on 9/23/2021 1:22 PM by Dr. Rolf Naik M.D.      XR Chest Post CVA Port    Result Date: 9/30/2021  CHEST SINGLE VIEW  HISTORY: Check PICC placement  COMPARISON: AP chest 09/19/2021  FINDINGS: Right-sided PICC has been placed and the tip extends superiorly into the right IJ vein and right neck and off the field of view. This needs to be repositioned.  A feeding tube is present. There is hazy basilar opacity consistent with a combination of pleural fluid and atelectasis or infiltrates. There is no pneumothorax. There is gaseous distention of bowel loops in the upper abdomen. Cardiac monitoring leads are noted.      1. Right-sided PICC has been placed and the tip extends into the right IJ vein/neck and off the  "field superiorly and this needs to be repositioned. 2. Low lung volumes with small bilateral pleural effusions associated with hazy basilar atelectasis or infiltrates. 3. Gaseous distention of bowel loops in the upper abdomen. 4. Feeding tube is present.  This report was finalized on 9/30/2021 6:00 PM by Dr. Richard Pfeiffer M.D.      XR Chest Post CVA Port    Result Date: 9/21/2021  XR CHEST POST CVA PORT-  INDICATIONS: PICC placement  TECHNIQUE: Frontal view of the chest  COMPARISON: 09/19/2021  FINDINGS:  Right PICC extends to the proximal right subclavian region, then loops back down the right upper arm, then loops once more, distal tip in the right axillary region. Repositioning of the catheter is needed. The heart size is normal. Pulmonary vasculature is unremarkable. Small atelectasis/infiltrate at the bases, left more than right. No pneumothorax or large pleural effusion. No acute osseous process.       As described.  Discussed by telephone with patient's nurse, Seven, at time of interpretation, 1612, 09/21/2021.  This report was finalized on 9/21/2021 4:18 PM by Dr. Rolf Naik M.D.        Vitals/Labs/I&O  162.6 cm (64.02\")  68.4 kg (150 lb 12.7 oz)  Body mass index is 25.87 kg/m².   Temp:  [96 °F (35.6 °C)-97.8 °F (36.6 °C)] 96 °F (35.6 °C)  Heart Rate:  [64-81] 68  Resp:  [16-18] 18  BP: ()/(66-82) 101/76    Results from last 7 days   Lab Units 10/04/21  0635 10/03/21  0618 10/02/21  0646   WBC 10*3/mm3 10.32 8.62 8.88                           Estimated Creatinine Clearance: 174.5 mL/min (A) (by C-G formula based on SCr of 0.43 mg/dL (L)).  Results from last 7 days   Lab Units 10/04/21  0635 10/03/21  0618 10/02/21  0647   BUN mg/dL 19 17 17   CREATININE mg/dL 0.43* 0.39* 0.37*     Intake & Output (last 3 days)       10/01 0701 - 10/02 0700 10/02 0701 - 10/03 0700 10/03 0701 - 10/04 0700 10/04 0701 - 10/05 0700    P.O. 0 0 0     I.V. (mL/kg) 40 (0.6) 50 (0.7) 260 (3.8)     Other  90 90 30 " "   NG/GT  1350 2470     IV Piggyback 800  600     Total Intake(mL/kg) 840 (12) 1490 (21.8) 3420 (50) 30 (0.4)    Urine (mL/kg/hr) 850 (0.5) 1700 (1) 4000 (2.4)     Stool  0 0     Total Output 850 1700 4000     Net -10 -210 -580 +30            Stool Unmeasured Occurrence  2 x 2 x           Vancomycin Dosing and Level History:  9/19 2200 1500mg IV x1  9/21 random level at 1132 = 11 mcg/mL, 1524 750mg IV x1  9/22 random level at 060 = 15.9 mcg/mL, 1052, 2216 500mg IV q12h  9/23 1238, 2139, trough level at 2140 = 17.8 mcg/mL  9/24 random level at 0500 (unclear if processed using 9/24 or 9/25 sample - see 9/25 [facility] note) = 22.8 mcg/mL, 0945, 2115  9/25 1334, scheduled dosing switched to intermittent  9/26 0024 750mg IV x1, random level at 1244 = 21.1 mcg/mL (~12hr level)  9/26 2032 500mg IV x1  9/27 0628 Vanc level: 18.8 (~10 hr level), Vancomycin 1000mg IV x1  9/28 \"trough\" at 0618 = 18.4 mcg/mL, dose at 1606 1000mg IV q24h  9/29 0941  9/30 trough at 0906 = 18.5 mcg/mL, dose at 1124  10/1 1339  10/2 0958  10/3 1146  10/4 1129 = 10.6 mcg/mL, dose at 1330             Results from last 7 days   Lab Units 10/04/21  1229 09/30/21  0906 09/28/21  0618   VANCOMYCIN TR mcg/mL 10.60 18.50 18.40       Assessment/Plan:    Assessment:  Bayesian analysis of the most recent level(s) using okay.com provides the following patient-specific pharmacokinetic parameters:   CL: 2.9 L/h   Vd: 83.2 L   T1/2: 21.9 h  If the predicted trough on this regimen is not within what was previously considered a \"target trough range\", the AUC24 (a stronger predictor of vancomycin efficacy) is predicted to achieve the accepted target of 400-600 mg*h/L. To best balance efficacy and toxicity, we will be targeting AUC24 in this patient rather than steady-state trough levels.     Increasing the regimen to 1250 mg (18.3 mg/kg) IV every 24 hours gives a predicted steady-state trough of 13.3 mg/L and AUC24 of 438 mg*h/L.    Recommendations/Plan:  - Increase " vancomycin regimen to 1250 mg (18.3 mg/kg) IV every 24 hours  - Obtain vancomycin level on 10/7 at 0900 or sooner if acute changes  - Continue to monitor SCr      Thank you for involving pharmacy in this patient's care. Please contact pharmacy with any questions or concerns.                           Asif Nielsen [facility]  Clinical Pharmacist  10/04/21 15:14 EDT

## 2021-10-04 NOTE — PLAN OF CARE
Goal Outcome Evaluation:  Plan of Care Reviewed With: patient        Progress: no change  Outcome Summary: VSS on room air. Continuing Q2 turns and repositioning. TF off since midnight. Planning for peg tube placement today. Bm x1. Good urine output from diaz. Resting well in bed at this time. Will continue to monitor.

## 2021-10-04 NOTE — PROGRESS NOTES
[facility] Medicine Services  PROGRESS NOTE    Patient Name: Servando Kapadia  : 1960  MRN: [id number]    Date of Admission: 2021  Primary Care Physician: No primary care provider on file.    Subjective   Subjective     CC:  Follow-up pneumonia    HPI:  Patient resting comfortably.  He remains not conversational and unable to provide further history.  No other new events reported.  PEG tube has been delayed till tomorrow due to operating schedule.      Review of Systems  Limited due to mental status.    Objective   Objective     Vital Signs:   Temp:  [96.5 °F (35.8 °C)-98 °F (36.7 °C)] 96.5 °F (35.8 °C)  Heart Rate:  [64-81] 73  Resp:  [16-18] 18  BP: ()/(66-82) 100/66        Physical Exam:  Constitutional:Awake, alert, chronically ill-appearing  HENT: NCAT, mucous membranes moist, neck supple  Respiratory: Occasional coarse breath sounds, normal pulmonary effort and no respiratory distress  Cardiovascular: RRR, normal radial pulses  Gastrointestinal: Positive bowel sounds, soft, nontender, nondistended  Musculoskeletal: Normal musculature for age, no lower extremity edema, BMI 25  Psychiatric: Calm affect, not conversational but occasional verbalization  Neurologic: Speaks occasional words, difficult to assess orientation, can follow some simple commands  Skin: No rashes or jaundice, warm      Results Reviewed:  Results from last 7 days   Lab Units 10/04/21  0635 10/03/21  0618 10/02/21  0646   WBC 10*3/mm3 10.32 8.62 8.88   HEMOGLOBIN g/dL 8.5* 7.6* 7.3*   HEMATOCRIT % 26.4* 23.4* 22.4*   PLATELETS 10*3/mm3 324 268 236   INR  0.94  --   --      Results from last 7 days   Lab Units 10/04/21  0635 10/03/21  0618 10/02/21  0647   SODIUM mmol/L 141 140 139   POTASSIUM mmol/L 4.6 4.1 3.9   CHLORIDE mmol/L 105 106 105   CO2 mmol/L 30.9* 27.8 28.7   BUN mg/dL 19 17 17   CREATININE mg/dL 0.43* 0.39* 0.37*   GLUCOSE mg/dL 74 86 124*   CALCIUM mg/dL 9.3 8.6 8.4*   ALT (SGPT) U/L  --   --  12   AST  (SGOT) U/L  --   --  13     Estimated Creatinine Clearance: 174.5 mL/min (A) (by C-G formula based on SCr of 0.43 mg/dL (L)).    Microbiology Results Abnormal     Procedure Component Value - Date/Time    COVID PRE-OP / PRE-PROCEDURE SCREENING ORDER (NO ISOLATION) - Swab, Nasopharynx [[id number]]  (Normal) Collected: 10/03/21 0824    Lab Status: Final result Specimen: Swab from Nasopharynx Updated: 10/03/21 0955    Narrative:      The following orders were created for panel order COVID PRE-OP / PRE-PROCEDURE SCREENING ORDER (NO ISOLATION) - Swab, Nasopharynx.  Procedure                               Abnormality         Status                     ---------                               -----------         ------                     COVID-19,[facility]...[[id number]]  Normal              Final result                 Please view results for these tests on the individual orders.    COVID-19,[facility] CEPHEID/SANDRA NP SWAB IN TRANSPORT MEDIA 8-12 HR TAT - Swab, Nasopharynx [[id number]]  (Normal) Collected: 10/03/21 0824    Lab Status: Final result Specimen: Swab from Nasopharynx Updated: 10/03/21 0955     COVID19 Not Detected    Narrative:      Fact sheet for providers: https://www.fda.gov/media/984415/download    Fact sheet for patients: https://www.fda.gov/media/104756/download    Test performed by PCR.    Blood Culture - Blood, Arm, Left [[id number]] Collected: 09/23/21 1322    Lab Status: Final result Specimen: Blood from Arm, Left Updated: 09/28/21 1346     Blood Culture No growth at 5 days    Blood Culture - Blood, Blood, PICC Line [[id number]] Collected: 09/23/21 0240    Lab Status: Final result Specimen: Blood, PICC Line Updated: 09/28/21 0301     Blood Culture No growth at 5 days    Blood Culture - Blood, Arm, Right [[id number]] Collected: 09/19/21 1640    Lab Status: Final result Specimen: Blood from Arm, Right Updated: 09/24/21 1700     Blood Culture No growth at 5 days    Respiratory Panel PCR  w/COVID-19(SARS-CoV-2) [facility]ENN[facility] In-House, NP Swab in UTM/VTM, 3-4 HR TAT - Swab, Nasopharynx [[id number]]  (Normal) Collected: 09/19/21 1648    Lab Status: Final result Specimen: Swab from Nasopharynx Updated: 09/19/21 0319     ADENOVIRUS, PCR Not Detected     Coronavirus 229E Not Detected     Coronavirus HKU1 Not Detected     Coronavirus NL63 Not Detected     Coronavirus OC43 Not Detected     COVID19 Not Detected     Human Metapneumovirus Not Detected     Human Rhinovirus/Enterovirus Not Detected     Influenza A PCR Not Detected     Influenza B PCR Not Detected     Parainfluenza Virus 1 Not Detected     Parainfluenza Virus 2 Not Detected     Parainfluenza Virus 3 Not Detected     Parainfluenza Virus 4 Not Detected     RSV, PCR Not Detected     Bordetella pertussis pcr Not Detected     Bordetella parapertussis PCR Not Detected     Chlamydophila pneumoniae PCR Not Detected     Mycoplasma pneumo by PCR Not Detected    Narrative:      In the setting of a positive respiratory panel with a viral infection PLUS a negative procalcitonin without other underlying concern for bacterial infection, consider observing off antibiotics or discontinuation of antibiotics and continue supportive care. If the respiratory panel is positive for atypical bacterial infection (Bordetella pertussis, Chlamydophila pneumoniae, or Mycoplasma pneumoniae), consider antibiotic de-escalation to target atypical bacterial infection.          Imaging Results (Last 24 Hours)     ** No results found for the last 24 hours. **          Results for orders placed during the hospital encounter of 09/19/21    Adult Transthoracic Echo Complete W/ Cont if Necessary Per Protocol    Interpretation Summary  · Estimated left ventricular EF = 65% Left ventricular systolic function is normal.  · The left ventricular cavity is small in size.  · Left ventricular diastolic function was normal.  · Very technically difficult study with limited views  available      I have reviewed the medications:  Scheduled Meds:atorvastatin, 40 mg, Oral, Nightly  baclofen, 5 mg, Oral, BID  ceFAZolin, 1 g, Intravenous, Once  cholecalciferol, 1,000 Units, Oral, Daily  fosphenytoin, 100 mg PE, Intravenous, Q8H  furosemide, 10 mg, Oral, Daily  insulin lispro, 0-9 Units, Subcutaneous, TID AC  magnesium oxide, 400 mg, Oral, Daily  metoprolol tartrate, 12.5 mg, Oral, Q12H  midodrine, 5 mg, Nasogastric, TID AC  mirtazapine, 15 mg, Oral, Nightly  multivitamin, 1 tablet, Oral, Daily  sodium bicarbonate, 1,300 mg, Nasogastric, TID  sodium chloride, 10 mL, Intravenous, Q12H  sodium chloride, 10 mL, Intravenous, Q12H  vancomycin, 1,000 mg, Intravenous, Q24H  vitamin B-12, 1,000 mcg, Oral, Daily      Continuous Infusions:Pharmacy Consult,   Pharmacy to dose vancomycin,       PRN Meds:.•  acetaminophen  •  acetaminophen  •  bisacodyl  •  dextrose  •  dextrose  •  glucagon (human recombinant)  •  magnesium sulfate **OR** magnesium sulfate **OR** magnesium sulfate  •  nitroglycerin  •  ondansetron **OR** ondansetron  •  Pharmacy Consult  •  Pharmacy to dose vancomycin  •  potassium chloride  •  potassium phosphate infusion greater than 15 mMoles **OR** potassium phosphate infusion greater than 15 mMoles **OR** potassium phosphate **OR** sodium phosphate IVPB **OR** sodium phosphate IVPB  •  [COMPLETED] Insert peripheral IV **AND** sodium chloride  •  sodium chloride  •  sodium chloride  •  sodium chloride    Assessment/Plan   Assessment & Plan     Active Hospital Problems    Diagnosis  POA   • **MRSA pneumonia ([facility]) [J15.212]  Yes   • Atrial fibrillation (HCC) [I48.91]  Yes   • MRSA bacteremia [R78.81, B95.62]  Yes   • Type 2 diabetes mellitus (HCC) [E11.9]  Yes   • HCAP (healthcare-associated pneumonia) [J18.9]  Yes   • Seizure disorder ([facility]) [G40.909]  Yes   • Hypernatremia [E87.0]  Yes   • Elevated troponin [R77.8]  Yes   • Acute on chronic diastolic heart failure (HCC) [I50.33]  Yes   •  Chronic anticoagulation [Z79.01]  Not Applicable   • Oropharyngeal dysphagia [R13.12]  Yes   • Metabolic encephalopathy [G93.41]  Yes      Resolved Hospital Problems    Diagnosis Date Resolved POA   • Hypercalcemia [E83.52] 09/23/2021 Yes   • Hyperkalemia [E87.5] 09/21/2021 Yes   • TI (acute kidney injury) (HCC) [N17.9] 09/23/2021 Yes   • High anion gap metabolic acidosis [E87.2] 09/23/2021 Yes   • Sepsis with acute organ dysfunction (HCC) [A41.9, R65.20] 09/23/2021 Yes        Brief Hospital Course to date:  Servando Kapadia is a 61 y.o. male with history of stroke with left-sided hemiparesis, type 2 diabetes, A. fib, seizure disorder who was sent in from his nursing home for shortness of breath.  He was admitted with septic shock due to MRSA bacteremia and pneumonia.     Discussion/plan:  Plan continues to be for PEG tube.  This will be scheduled for tomorrow per GI recommendations.  Restart tube feeding today.  Add on phosphorus level.  Replace phosphorus and magnesium today if needed.  Anemia improved today.  Patient given IV iron for what appears to be iron deficiency anemia.  B12 and folic acid were checked and are adequate.    Vancomycin as dosed by pharmacy, last level 18.  Continue to monitor levels.  Plan is for 4 weeks IV treatment.  Repeat laboratory studies tomorrow.      Dysphagia  -Received Dobbhoff tube feeding.  Speech therapy recommends remain n.p.o.  -Dr. Bansal discussed case with brother and sister in law.  They want to proceed with PEG tube.   -GI consulted for placement, noted Eliquis held for procedure     Septic shock due to MRSA bacteremia pneumonia  -On IV Vanc, plan for 4 weeks total from negative blood culture (end 10/21)  -TTE complete but not the most diagnostic as was technically difficult study.       Seizure disorder on fosphenytoin   -Neurology was concerned Keppra contributed to sedation.  Keppra was discontinued and Dilantin increased.     ARF/hyponatremia/acidosis    -Resolved     Elevated troponin secondary to renal disease/probable type II MI  -No global dyskinesia-hypokinesis with EF 65% on 9/21/2021 echo     Type 2 diabetes  -Issues with hypoglycemia earlier in admission have resolved  -Continue sliding scale     Past history of CVA with resulting immobility on Eliquis .     Metabolic encephalopathy  -Mental status is slowly improving.  He remains very dysarthric, but follows commands, answers yes or no questions     HTN off meds with improving hypotension on midodrine  -Home blood pressure medicines held on admission due to shock.  Currently requiring midodrine 5 mg 3 times daily.  Wean as tolerated        · DVT prophylaxis , Eliquis held in anticipation of procedure.    SCDs for now  · DNR.  · Discussed with patient and nursing  · Anticipate discharge to SNU facility timing yet to be determined.         CODE STATUS:   Code Status and Medical Interventions:   Ordered at: 09/19/21 2134     Limited Support to NOT Include:    Antiarrhythmic Drugs    Cardioversion/Defibrillation    Dialysis    Intubation    NIPPV (Non-Invasive Positive Pressure Support)    Vasopressors     Level Of Support Discussed With:    Next of Kin (If No Surrogate)     Code Status:    No CPR     Medical Interventions (Level of Support Prior to Arrest):    Limited       Jacoby Triana MD  10/04/21

## 2021-10-04 NOTE — PROGRESS NOTES
Pharmacy consult for Cerebyx dosing  Servando Kapadia is a 61 y.o. male 68.4 kg (150 lb 12.7 oz).    Pharmacy consulted to dose per Dr. Cornejo    Home Anticoagulation: Dilantin 100mg ER capsule BID  Active Inpatient Orders: fosphenytoin 100mg IV q8h PE (Phenytoin Equivalent)    Estimated Creatinine Clearance: 174.5 mL/min (A) (by C-G formula based on SCr of 0.43 mg/dL (L)).  Body mass index is 25.87 kg/m².    Creatinine   Date Value Ref Range Status   10/04/2021 0.43 (L) 0.76 - 1.27 mg/dL Final   10/03/2021 0.39 (L) 0.76 - 1.27 mg/dL Final   10/02/2021 0.37 (L) 0.76 - 1.27 mg/dL Final     10/4 Albumin: 1.90  10/4 Phenytoin level: 11.3    Calculated Corrected Phenytoin Level: 18.2 mcg/ML      PLAN:  After calculation of corrected phenytoin level based on current phenytoin and albumin levels, the level is therapeutic at 18.2 mcg/mL. Continue current regimen of fosphenytoin 100mg IV q8h. No further adjustments expected at this time, will close consult.    Thank you for involving pharmacy in this patient's care. Please reach out with any questions or concerns.    Asif Nielsen [facility]

## 2021-10-04 NOTE — PLAN OF CARE
Pt. Slightly hypotensive at times, on midodrine, vs otherwise wnl.  No c/o pain.  Pt. A&O x2, otherwise confused.  Pt. Q2h turn.  Pt. With PU on coccyx and heel.  Pt. Receiving IV abx.  Pt. Had run of IV Venofer today as well.  Pt. On TF at goal, will be stopped at mn for PEG place in a.m.  Frequent oral care.  Pt. With PICC line, dressing CDI.  Pt. With FC, cath care complete, adequate UOP.  Pt. Resting comfortably at present, will continue to monitor closely.      Problem: Adult Inpatient Plan of Care  Goal: Plan of Care Review  Outcome: Ongoing, Progressing  Flowsheets (Taken 10/4/2021 8376)  Progress: declining  Plan of Care Reviewed With: patient

## 2021-10-04 NOTE — PROGRESS NOTES
[facility]     Progress Note    Patient Name: Servando Kapadia  : 1960  MRN: [id number]  Primary Care Physician:  No primary care provider on file.  Date of admission: 2021    Subjective   Subjective     Chief Complaint: OROPHARYNGEA LDYSPHAGIA    History of Present Illness  Patient Reports SLEEPY  No complaints     Review of Systems   Unable to perform ROS: Psychiatric disorder       Objective   Objective     Vitals:   Temp:  [97 °F (36.1 °C)-98 °F (36.7 °C)] 97.8 °F (36.6 °C)  Heart Rate:  [64-81] 64  Resp:  [16-18] 16  BP: ()/(73-82) 96/82    Physical Exam  Constitutional:       Appearance: He is ill-appearing.   HENT:      Right Ear: External ear normal.      Left Ear: External ear normal.      Mouth/Throat:      Pharynx: Oropharynx is clear.   Eyes:      Conjunctiva/sclera: Conjunctivae normal.   Cardiovascular:      Rate and Rhythm: Normal rate.   Pulmonary:      Effort: Pulmonary effort is normal.   Abdominal:      General: Abdomen is flat. Bowel sounds are normal.   Neurological:      General: No focal deficit present.   Psychiatric:         Mood and Affect: Mood normal.          Result Review    Result Review:  I have personally reviewed the results from the time of this admission to 10/4/2021 07:47 EDT and agree with these findings:  []  Laboratory  []  Microbiology  []  Radiology  []  EKG/Telemetry   []  Cardiology/Vascular   []  Pathology  []  Old records      Assessment/Plan   Assessment / Plan     Brief Patient Summary:  Servando Kapadia is a 61 y.o. male   Oropharyngeal dysphagia  pneumonia    Active Hospital Problems:  Active Hospital Problems    Diagnosis    • **MRSA pneumonia (HCC)    • Atrial fibrillation (HCC)    • MRSA bacteremia    • Type 2 diabetes mellitus (HCC)    • HCAP (healthcare-associated pneumonia)    • Seizure disorder (HCC)    • Hypernatremia    • Elevated troponin    • Acute on chronic diastolic heart failure (HCC)    • Chronic anticoagulation    •  Oropharyngeal dysphagia    • Metabolic encephalopathy      Plan: I had tentatively planned egd with peg today, however unable to obtain a time that will fit schedule today,  egd with PEG tomorrow 7am,  Continue tube feeds today.       DVT prophylaxis:  Medical and mechanical DVT prophylaxis orders are present.    CODE STATUS:    Limited Support to NOT Include: Antiarrhythmic Drugs; Cardioversion/Defibrillation; Dialysis; Intubation; NIPPV (Non-Invasive Positive Pressure Support); Vasopressors  Level Of Support Discussed With: Next of Kin (If No Surrogate)  Code Status: No CPR  Medical Interventions (Level of Support Prior to Arrest): Limited    Disposition:  I expect patient to be discharged uncertain    Electronically signed by Lion Weber MD, 10/04/21, 7:47 AM EDT.

## 2021-10-04 NOTE — PROGRESS NOTES
"  Infectious Diseases Progress Note    Tricia Bose MD     [facility]  [place]: 15 days  Patient Identification:  Name: Servando Kapadia  Age: 61 y.o.  Sex: male  :  1960  MRN: [id number]         Primary Care Physician: No primary care provider on file.            Subjective: Events noted for PEG placed tomorrow  Interval History: See consultation note.    Objective:    Scheduled Meds:atorvastatin, 40 mg, Oral, Nightly  baclofen, 5 mg, Oral, BID  ceFAZolin, 1 g, Intravenous, Once  cholecalciferol, 1,000 Units, Oral, Daily  fosphenytoin, 100 mg PE, Intravenous, Q8H  furosemide, 10 mg, Oral, Daily  insulin lispro, 0-9 Units, Subcutaneous, TID AC  magnesium oxide, 400 mg, Oral, Daily  metoprolol tartrate, 12.5 mg, Oral, Q12H  midodrine, 5 mg, Nasogastric, TID AC  mirtazapine, 15 mg, Oral, Nightly  multivitamin, 1 tablet, Oral, Daily  sodium bicarbonate, 1,300 mg, Nasogastric, TID  sodium chloride, 10 mL, Intravenous, Q12H  sodium chloride, 10 mL, Intravenous, Q12H  [START ON 10/5/2021] vancomycin, 1,250 mg, Intravenous, Q24H  vitamin B-12, 1,000 mcg, Oral, Daily      Continuous Infusions:Pharmacy to dose vancomycin,         Vital signs in last 24 hours:  Temp:  [96 °F (35.6 °C)-97.8 °F (36.6 °C)] 96 °F (35.6 °C)  Heart Rate:  [64-81] 68  Resp:  [16-18] 18  BP: ()/(66-82) 101/76    Intake/Output:    Intake/Output Summary (Last 24 hours) at 10/4/2021 1600  Last data filed at 10/4/2021 0906  Gross per 24 hour   Intake 1190 ml   Output 4000 ml   Net -2810 ml       Exam:  /76 (BP Location: Left arm, Patient Position: Lying)   Pulse 68   Temp 96 °F (35.6 °C) (Oral)   Resp 18   Ht 162.6 cm (64.02\")   Wt 68.4 kg (150 lb 12.7 oz)   SpO2 96%   BMI 25.87 kg/m²   Patient is examined using the personal protective equipment as per guidelines from infection control for this particular patient as enacted.  Hand washing was performed before and after patient interaction.  General Appearance: " Chronically ill, awake, not answering questions, mumbles  Skin: warm and dry  HEENT: Oral mucosa is dry, nonicteric sclera, feeding tube in place  Neck: supple, no JVD  Lungs: Lateral rhonchi, unlabored breathing effort  Heart: RRR, normal S1 and S2, no S3, no rub  Abdomen: soft, no guarding, normoactive bowel, left lower/flank area pain pump site appears to be not inflamed or having any drainage.  : no palpable bladder, Beyer catheter anchored  Extremities: Significant contractures  Neuro: Does not engage and interact and has sequelae of previous stroke with contractures of extremities.       Data Review:    I reviewed the patient's new clinical results.  Results from last 7 days   Lab Units 10/04/21  0635 10/03/21  0618 10/02/21  0646 09/30/21  0556 09/29/21  0453 09/28/21  0618   WBC 10*3/mm3 10.32 8.62 8.88 8.99  --  8.38   HEMOGLOBIN g/dL 8.5* 7.6* 7.3* 7.9* 7.8* 8.2*   PLATELETS 10*3/mm3 324 268 236 225  --  230     Results from last 7 days   Lab Units 10/04/21  0635 10/03/21  0618 10/02/21  0647 09/30/21  0556 09/29/21  1535 09/28/21  0618   SODIUM mmol/L 141 140 139 140 139 142   POTASSIUM mmol/L 4.6 4.1 3.9 4.5 4.0 4.2   CHLORIDE mmol/L 105 106 105 109* 110* 115*   CO2 mmol/L 30.9* 27.8 28.7 27.1 25.5 21.4*   BUN mg/dL 19 17 17 15 14 11   CREATININE mg/dL 0.43* 0.39* 0.37* 0.33* 0.36* 0.34*   CALCIUM mg/dL 9.3 8.6 8.4* 8.3* 8.1* 8.2*   GLUCOSE mg/dL 74 86 124* 90 133* 117*       Microbiology Results (last 10 days)     Procedure Component Value - Date/Time    COVID PRE-OP / PRE-PROCEDURE SCREENING ORDER (NO ISOLATION) - Swab, Nasopharynx [[id number]]  (Normal) Collected: 10/03/21 0824    Lab Status: Final result Specimen: Swab from Nasopharynx Updated: 10/03/21 0955    Narrative:      The following orders were created for panel order COVID PRE-OP / PRE-PROCEDURE SCREENING ORDER (NO ISOLATION) - Swab, Nasopharynx.  Procedure                               Abnormality         Status                      ---------                               -----------         ------                     COVID-19,[facility]...[[id number]]  Normal              Final result                 Please view results for these tests on the individual orders.    COVID-19,[facility] CEPHEID/SANDRA NP SWAB IN TRANSPORT MEDIA 8-12 HR TAT - Swab, Nasopharynx [[id number]]  (Normal) Collected: 10/03/21 0824    Lab Status: Final result Specimen: Swab from Nasopharynx Updated: 10/03/21 0955     COVID19 Not Detected    Narrative:      Fact sheet for providers: https://www.fda.gov/media/685299/download    Fact sheet for patients: https://www.fda.gov/media/535430/download    Test performed by PCR.          Assessment:    MRSA pneumonia (HCC)    Metabolic encephalopathy    Oropharyngeal dysphagia    Chronic anticoagulation    HCAP (healthcare-associated pneumonia)    Seizure disorder (HCC)    Hypernatremia    Elevated troponin    Acute on chronic diastolic heart failure (HCC)    Type 2 diabetes mellitus (HCC)    MRSA bacteremia    Atrial fibrillation (HCC)  1-MRSA bacteremic sepsis likely secondary to healthcare associated pneumonia overall improved with current treatment.  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        Recommendations/Discussions:  · No further work-up for origin/source of and secondary seeding of MRSA bacteremia unless he shows objective evidence of clinical deterioration.    · Given his inability to participate in review of systems and inability to decide which organ system needs to be worked up for secondary seeding or source of MRSA bacteremia and his propensity for future recurrent hospitalizations I would recommend completing treatment of bacteremic MRSA sepsis with 4 weeks of IV vancomycin from last negative blood culture.    · Continue aggressive supportive care and prevent complications of immobility and dysphagia if  possible.  · Prognosis is guarded to poor with risk of future recurrent hospitalization  · Plans for feeding tube placement in a.m. noted.    Tricia Bose MD  10/4/2021  16:00 EDT    Much of this encounter note is an electronic transcription/translation of spoken language to printed text. The electronic translation of spoken language may permit erroneous, or at times, nonsensical words or phrases to be inadvertently transcribed; Although I have reviewed the note for such errors, some may still exist

## 2021-10-05 ENCOUNTER — ON CAMPUS - OUTPATIENT (OUTPATIENT)
Dept: URBAN - METROPOLITAN AREA HOSPITAL 114 | Facility: HOSPITAL | Age: 61
End: 2021-10-05

## 2021-10-05 DIAGNOSIS — R13.12 DYSPHAGIA, OROPHARYNGEAL PHASE: ICD-10-CM

## 2021-10-05 DIAGNOSIS — K44.9 DIAPHRAGMATIC HERNIA WITHOUT OBSTRUCTION OR GANGRENE: ICD-10-CM

## 2021-10-05 LAB
ANION GAP SERPL CALCULATED.3IONS-SCNC: 3.5 MMOL/L (ref 5–15)
BASOPHILS # BLD AUTO: 0.04 10*3/MM3 (ref 0–0.2)
BASOPHILS NFR BLD AUTO: 0.5 % (ref 0–1.5)
BUN SERPL-MCNC: 18 MG/DL (ref 8–23)
BUN/CREAT SERPL: 51.4 (ref 7–25)
CALCIUM SPEC-SCNC: 9 MG/DL (ref 8.6–10.5)
CHLORIDE SERPL-SCNC: 105 MMOL/L (ref 98–107)
CO2 SERPL-SCNC: 30.5 MMOL/L (ref 22–29)
CREAT SERPL-MCNC: 0.35 MG/DL (ref 0.76–1.27)
DEPRECATED RDW RBC AUTO: 50 FL (ref 37–54)
EOSINOPHIL # BLD AUTO: 0.15 10*3/MM3 (ref 0–0.4)
EOSINOPHIL NFR BLD AUTO: 1.9 % (ref 0.3–6.2)
ERYTHROCYTE [DISTWIDTH] IN BLOOD BY AUTOMATED COUNT: 15.9 % (ref 12.3–15.4)
GFR SERPL CREATININE-BSD FRML MDRD: >150 ML/MIN/1.73
GFR SERPL CREATININE-BSD FRML MDRD: >150 ML/MIN/1.73
GLUCOSE BLDC GLUCOMTR-MCNC: 111 MG/DL (ref 70–130)
GLUCOSE BLDC GLUCOMTR-MCNC: 116 MG/DL (ref 70–130)
GLUCOSE BLDC GLUCOMTR-MCNC: 78 MG/DL (ref 70–130)
GLUCOSE BLDC GLUCOMTR-MCNC: 86 MG/DL (ref 70–130)
GLUCOSE BLDC GLUCOMTR-MCNC: 88 MG/DL (ref 70–130)
GLUCOSE SERPL-MCNC: 69 MG/DL (ref 65–99)
HCT VFR BLD AUTO: 23.6 % (ref 37.5–51)
HGB BLD-MCNC: 7.6 G/DL (ref 13–17.7)
IMM GRANULOCYTES # BLD AUTO: 0.04 10*3/MM3 (ref 0–0.05)
IMM GRANULOCYTES NFR BLD AUTO: 0.5 % (ref 0–0.5)
LYMPHOCYTES # BLD AUTO: 2.13 10*3/MM3 (ref 0.7–3.1)
LYMPHOCYTES NFR BLD AUTO: 26.9 % (ref 19.6–45.3)
MCH RBC QN AUTO: 27.8 PG (ref 26.6–33)
MCHC RBC AUTO-ENTMCNC: 32.2 G/DL (ref 31.5–35.7)
MCV RBC AUTO: 86.4 FL (ref 79–97)
MONOCYTES # BLD AUTO: 0.92 10*3/MM3 (ref 0.1–0.9)
MONOCYTES NFR BLD AUTO: 11.6 % (ref 5–12)
NEUTROPHILS NFR BLD AUTO: 4.63 10*3/MM3 (ref 1.7–7)
NEUTROPHILS NFR BLD AUTO: 58.6 % (ref 42.7–76)
NRBC BLD AUTO-RTO: 0 /100 WBC (ref 0–0.2)
PLATELET # BLD AUTO: 324 10*3/MM3 (ref 140–450)
PMV BLD AUTO: 9.7 FL (ref 6–12)
POTASSIUM SERPL-SCNC: 4.3 MMOL/L (ref 3.5–5.2)
RBC # BLD AUTO: 2.73 10*6/MM3 (ref 4.14–5.8)
SODIUM SERPL-SCNC: 139 MMOL/L (ref 136–145)
WBC # BLD AUTO: 7.91 10*3/MM3 (ref 3.4–10.8)

## 2021-10-05 PROCEDURE — 0DH68UZ INSERTION OF FEEDING DEVICE INTO STOMACH, VIA NATURAL OR ARTIFICIAL OPENING ENDOSCOPIC: ICD-10-PCS | Performed by: INTERNAL MEDICINE

## 2021-10-05 PROCEDURE — 3E0G76Z INTRODUCTION OF NUTRITIONAL SUBSTANCE INTO UPPER GI, VIA NATURAL OR ARTIFICIAL OPENING: ICD-10-PCS | Performed by: INTERNAL MEDICINE

## 2021-10-05 PROCEDURE — 85025 COMPLETE CBC W/AUTO DIFF WBC: CPT | Performed by: INTERNAL MEDICINE

## 2021-10-05 PROCEDURE — 82962 GLUCOSE BLOOD TEST: CPT

## 2021-10-05 PROCEDURE — 25010000002 PROPOFOL 10 MG/ML EMULSION: Performed by: ANESTHESIOLOGY

## 2021-10-05 PROCEDURE — 25010000002 FOSPHENYTOIN 100 MG PE/2ML SOLUTION: Performed by: HOSPITALIST

## 2021-10-05 PROCEDURE — 80048 BASIC METABOLIC PNL TOTAL CA: CPT | Performed by: INTERNAL MEDICINE

## 2021-10-05 PROCEDURE — 25010000002 VANCOMYCIN 10 G RECONSTITUTED SOLUTION: Performed by: HOSPITALIST

## 2021-10-05 PROCEDURE — 25010000002 CEFAZOLIN 1-4 GM/50ML-% SOLUTION: Performed by: INTERNAL MEDICINE

## 2021-10-05 PROCEDURE — 43246 EGD PLACE GASTROSTOMY TUBE: CPT | Performed by: INTERNAL MEDICINE

## 2021-10-05 RX ORDER — LIDOCAINE HYDROCHLORIDE 20 MG/ML
INJECTION, SOLUTION INFILTRATION; PERINEURAL AS NEEDED
Status: DISCONTINUED | OUTPATIENT
Start: 2021-10-05 | End: 2021-10-05 | Stop reason: SURG

## 2021-10-05 RX ORDER — SODIUM CHLORIDE 9 MG/ML
30 INJECTION, SOLUTION INTRAVENOUS CONTINUOUS PRN
Status: DISCONTINUED | OUTPATIENT
Start: 2021-10-05 | End: 2021-10-07 | Stop reason: HOSPADM

## 2021-10-05 RX ORDER — PROPOFOL 10 MG/ML
VIAL (ML) INTRAVENOUS CONTINUOUS PRN
Status: DISCONTINUED | OUTPATIENT
Start: 2021-10-05 | End: 2021-10-05 | Stop reason: SURG

## 2021-10-05 RX ADMIN — MIDODRINE HYDROCHLORIDE 5 MG: 5 TABLET ORAL at 12:13

## 2021-10-05 RX ADMIN — Medication 1000 UNITS: at 08:33

## 2021-10-05 RX ADMIN — BACLOFEN 5 MG: 10 TABLET ORAL at 08:34

## 2021-10-05 RX ADMIN — MIRTAZAPINE 15 MG: 15 TABLET, FILM COATED ORAL at 21:06

## 2021-10-05 RX ADMIN — MIDODRINE HYDROCHLORIDE 5 MG: 5 TABLET ORAL at 18:28

## 2021-10-05 RX ADMIN — METOPROLOL TARTRATE 12.5 MG: 25 TABLET, FILM COATED ORAL at 21:06

## 2021-10-05 RX ADMIN — FUROSEMIDE 10 MG: 20 TABLET ORAL at 08:33

## 2021-10-05 RX ADMIN — SODIUM BICARBONATE 1300 MG: 650 TABLET ORAL at 18:27

## 2021-10-05 RX ADMIN — SODIUM CHLORIDE 30 ML/HR: 9 INJECTION, SOLUTION INTRAVENOUS at 06:50

## 2021-10-05 RX ADMIN — CEFAZOLIN SODIUM 1 G: 1 INJECTION, SOLUTION INTRAVENOUS at 07:12

## 2021-10-05 RX ADMIN — SODIUM CHLORIDE, PRESERVATIVE FREE 10 ML: 5 INJECTION INTRAVENOUS at 21:07

## 2021-10-05 RX ADMIN — SODIUM BICARBONATE 1300 MG: 650 TABLET ORAL at 21:06

## 2021-10-05 RX ADMIN — BACLOFEN 5 MG: 10 TABLET ORAL at 21:06

## 2021-10-05 RX ADMIN — FOSPHENYTOIN SODIUM 100 MG PE: 50 INJECTION, SOLUTION INTRAMUSCULAR; INTRAVENOUS at 12:13

## 2021-10-05 RX ADMIN — MAGNESIUM OXIDE 400 MG (241.3 MG MAGNESIUM) TABLET 400 MG: TABLET at 08:33

## 2021-10-05 RX ADMIN — PROPOFOL 250 MCG/KG/MIN: 10 INJECTION, EMULSION INTRAVENOUS at 07:07

## 2021-10-05 RX ADMIN — SODIUM CHLORIDE, PRESERVATIVE FREE 10 ML: 5 INJECTION INTRAVENOUS at 12:30

## 2021-10-05 RX ADMIN — METOPROLOL TARTRATE 12.5 MG: 25 TABLET, FILM COATED ORAL at 08:34

## 2021-10-05 RX ADMIN — SODIUM CHLORIDE, PRESERVATIVE FREE 10 ML: 5 INJECTION INTRAVENOUS at 12:13

## 2021-10-05 RX ADMIN — LIDOCAINE HYDROCHLORIDE 60 MG: 20 INJECTION, SOLUTION INFILTRATION; PERINEURAL at 07:08

## 2021-10-05 RX ADMIN — Medication 1000 MCG: at 08:33

## 2021-10-05 RX ADMIN — ATORVASTATIN CALCIUM 40 MG: 20 TABLET, FILM COATED ORAL at 21:06

## 2021-10-05 RX ADMIN — Medication 1 TABLET: at 08:34

## 2021-10-05 RX ADMIN — VANCOMYCIN HYDROCHLORIDE 1250 MG: 10 INJECTION, POWDER, LYOPHILIZED, FOR SOLUTION INTRAVENOUS at 12:13

## 2021-10-05 RX ADMIN — SODIUM BICARBONATE 1300 MG: 650 TABLET ORAL at 08:34

## 2021-10-05 RX ADMIN — MIDODRINE HYDROCHLORIDE 5 MG: 5 TABLET ORAL at 08:34

## 2021-10-05 RX ADMIN — SODIUM CHLORIDE, PRESERVATIVE FREE 10 ML: 5 INJECTION INTRAVENOUS at 08:35

## 2021-10-05 NOTE — ANESTHESIA POSTPROCEDURE EVALUATION
"Patient: Servando Kapadia    Procedure Summary     Date: 10/05/21 Room / Location:  [facility]    Anesthesia Start: 0658 Anesthesia Stop: 0730    Procedure: ESOPHAGOGASTRODUODENOSCOPY WITH PERCUTANEOUS ENDOSCOPIC GASTROSTOMY TUBE INSERTION (N/A Esophagus) Diagnosis:       Oropharyngeal dysphagia      (Oropharyngeal dysphagia [R13.12])    Surgeons: Lion Weber MD Provider: Guilherme Rankin MD    Anesthesia Type: MAC ASA Status: 4          Anesthesia Type: MAC    Vitals  Vitals Value Taken Time   /54 10/05/21 0747   Temp     Pulse 78 10/05/21 0747   Resp 16 10/05/21 0747   SpO2 91 % 10/05/21 0747           Post Anesthesia Care and Evaluation    Patient location during evaluation: PACU  Patient participation: complete - patient participated  Level of consciousness: awake  Pain score: 0  Pain management: adequate  Airway patency: patent  Anesthetic complications: No anesthetic complications  PONV Status: none  Cardiovascular status: acceptable  Respiratory status: acceptable  Hydration status: acceptable    Comments: /54   Pulse 78   Temp 36 °C (96.8 °F) (Oral)   Resp 16   Ht 162.6 cm (64.02\")   Wt 64.8 kg (142 lb 13.7 oz)   SpO2 91%   BMI 24.51 kg/m²       "

## 2021-10-05 NOTE — PLAN OF CARE
Goal Outcome Evaluation:  Plan of Care Reviewed With: patient        Progress: no change  Outcome Summary: VSS on room air. Q2 turn and repositioning. TF off since midnight for peg tube placement this morning. Resting well in bed at this time. Will continue to monitor.

## 2021-10-05 NOTE — ANESTHESIA PREPROCEDURE EVALUATION
Anesthesia Evaluation     Patient summary reviewed and Nursing notes reviewed   NPO Solid Status: > 6 hours  NPO Liquid Status: > 6 hours           Airway   Mallampati: I  TM distance: >3 FB  Neck ROM: full  No difficulty expected  Dental - normal exam     Pulmonary - normal exam   (+) pneumonia , sleep apnea,   Cardiovascular - normal exam    (+) hypertension, dysrhythmias Atrial Fib, CHF , hyperlipidemia,       Neuro/Psych  (+) seizures, CVA residual symptoms, psychiatric history Depression,     GI/Hepatic/Renal/Endo    (+)   renal disease, diabetes mellitus,     Musculoskeletal (-) negative ROS    Abdominal  - normal exam    Bowel sounds: normal.   Substance History - negative use     OB/GYN negative ob/gyn ROS         Other                      Anesthesia Plan    ASA 4     MAC       Anesthetic plan, all risks, benefits, and alternatives have been provided, discussed and informed consent has been obtained with: patient.

## 2021-10-05 NOTE — PROGRESS NOTES
[facility] Medicine Services  PROGRESS NOTE    Patient Name: Servando Kapadia  : 1960  MRN: [id number]    Date of Admission: 2021  Primary Care Physician: No primary care provider on file.    Subjective   Subjective     CC:  Follow-up pneumonia    HPI:  Patient continues to rest comfortably.  Remains not conversational.  PEG tube today.      Review of Systems  Limited due to mental status.    Objective   Objective     Vital Signs:   Temp:  [96 °F (35.6 °C)-97.6 °F (36.4 °C)] 96 °F (35.6 °C)  Heart Rate:  [64-78] 78  Resp:  [14-20] 18  BP: (101-129)/() 128/87        Physical Exam:  Constitutional:Awake, alert, chronically ill-appearing  HENT: NCAT, mucous membranes moist, neck supple  Respiratory: Occasional coarse breath sounds, normal pulmonary effort and no respiratory distress  Cardiovascular: RRR, normal radial pulses  Gastrointestinal: Positive bowel sounds, soft, nontender, nondistended  Musculoskeletal: Normal musculature for age, no lower extremity edema, BMI 25  Psychiatric: Calm affect, not conversational but occasional verbalization  Neurologic: Speaks occasional words, difficult to assess orientation, can follow some simple commands  Skin: No rashes or jaundice, warm      Results Reviewed:  Results from last 7 days   Lab Units 10/05/21  0626 10/04/21  0635 10/03/21  0618   WBC 10*3/mm3 7.91 10.32 8.62   HEMOGLOBIN g/dL 7.6* 8.5* 7.6*   HEMATOCRIT % 23.6* 26.4* 23.4*   PLATELETS 10*3/mm3 324 324 268   INR   --  0.94  --      Results from last 7 days   Lab Units 10/05/21  0626 10/04/21  0635 10/03/21  0618 10/02/21  0647 10/02/21  0647   SODIUM mmol/L 139 141 140   < > 139   POTASSIUM mmol/L 4.3 4.6 4.1   < > 3.9   CHLORIDE mmol/L 105 105 106   < > 105   CO2 mmol/L 30.5* 30.9* 27.8   < > 28.7   BUN mg/dL 18 19 17   < > 17   CREATININE mg/dL 0.35* 0.43* 0.39*   < > 0.37*   GLUCOSE mg/dL 69 74 86   < > 124*   CALCIUM mg/dL 9.0 9.3 8.6   < > 8.4*   ALT (SGPT) U/L  --   --   --   --  12    AST (SGOT) U/L  --   --   --   --  13    < > = values in this interval not displayed.     Estimated Creatinine Clearance: 203.1 mL/min (A) (by C-G formula based on SCr of 0.35 mg/dL (L)).    Microbiology Results Abnormal     Procedure Component Value - Date/Time    COVID PRE-OP / PRE-PROCEDURE SCREENING ORDER (NO ISOLATION) - Swab, Nasopharynx [[id number]]  (Normal) Collected: 10/03/21 0824    Lab Status: Final result Specimen: Swab from Nasopharynx Updated: 10/03/21 0955    Narrative:      The following orders were created for panel order COVID PRE-OP / PRE-PROCEDURE SCREENING ORDER (NO ISOLATION) - Swab, Nasopharynx.  Procedure                               Abnormality         Status                     ---------                               -----------         ------                     COVID-19,[facility]HOUSE...[[id number]]  Normal              Final result                 Please view results for these tests on the individual orders.    COVID-19,[facility]HOU[facility] CEPHEID/SANDRA NP SWAB IN TRANSPORT MEDIA 8-12 HR TAT - Swab, Nasopharynx [[id number]]  (Normal) Collected: 10/03/21 0824    Lab Status: Final result Specimen: Swab from Nasopharynx Updated: 10/03/21 0955     COVID19 Not Detected    Narrative:      Fact sheet for providers: https://www.fda.gov/media/563616/download    Fact sheet for patients: https://www.fda.gov/media/963459/download    Test performed by PCR.    Blood Culture - Blood, Arm, Left [[id number]] Collected: 09/23/21 1322    Lab Status: Final result Specimen: Blood from Arm, Left Updated: 09/28/21 1346     Blood Culture No growth at 5 days    Blood Culture - Blood, Blood, PICC Line [[id number]] Collected: 09/23/21 0240    Lab Status: Final result Specimen: Blood, PICC Line Updated: 09/28/21 0301     Blood Culture No growth at 5 days    Blood Culture - Blood, Arm, Right [[id number]] Collected: 09/19/21 1640    Lab Status: Final result Specimen: Blood from Arm, Right Updated: 09/24/21 1700     Blood  Culture No growth at 5 days    Respiratory Panel PCR w/COVID-19(SARS-CoV-2) [facility]/GARETH/SHARIF/PAD/COR/MAD/BRENNEN In-House, NP Swab in UTM/VTM, 3-4 HR TAT - Swab, Nasopharynx [[id number]]  (Normal) Collected: 09/19/21 1648    Lab Status: Final result Specimen: Swab from Nasopharynx Updated: 09/19/21 9411     ADENOVIRUS, PCR Not Detected     Coronavirus 229E Not Detected     Coronavirus HKU1 Not Detected     Coronavirus NL63 Not Detected     Coronavirus OC43 Not Detected     COVID19 Not Detected     Human Metapneumovirus Not Detected     Human Rhinovirus/Enterovirus Not Detected     Influenza A PCR Not Detected     Influenza B PCR Not Detected     Parainfluenza Virus 1 Not Detected     Parainfluenza Virus 2 Not Detected     Parainfluenza Virus 3 Not Detected     Parainfluenza Virus 4 Not Detected     RSV, PCR Not Detected     Bordetella pertussis pcr Not Detected     Bordetella parapertussis PCR Not Detected     Chlamydophila pneumoniae PCR Not Detected     Mycoplasma pneumo by PCR Not Detected    Narrative:      In the setting of a positive respiratory panel with a viral infection PLUS a negative procalcitonin without other underlying concern for bacterial infection, consider observing off antibiotics or discontinuation of antibiotics and continue supportive care. If the respiratory panel is positive for atypical bacterial infection (Bordetella pertussis, Chlamydophila pneumoniae, or Mycoplasma pneumoniae), consider antibiotic de-escalation to target atypical bacterial infection.          Imaging Results (Last 24 Hours)     ** No results found for the last 24 hours. **          Results for orders placed during the hospital encounter of 09/19/21    Adult Transthoracic Echo Complete W/ Cont if Necessary Per Protocol    Interpretation Summary  · Estimated left ventricular EF = 65% Left ventricular systolic function is normal.  · The left ventricular cavity is small in size.  · Left ventricular diastolic function was normal.  ·  Very technically difficult study with limited views available      I have reviewed the medications:  Scheduled Meds:atorvastatin, 40 mg, Oral, Nightly  baclofen, 5 mg, Oral, BID  cholecalciferol, 1,000 Units, Oral, Daily  fosphenytoin, 100 mg PE, Intravenous, Q8H  furosemide, 10 mg, Oral, Daily  insulin lispro, 0-9 Units, Subcutaneous, TID AC  magnesium oxide, 400 mg, Oral, Daily  metoprolol tartrate, 12.5 mg, Oral, Q12H  midodrine, 5 mg, Nasogastric, TID AC  mirtazapine, 15 mg, Oral, Nightly  multivitamin, 1 tablet, Oral, Daily  sodium bicarbonate, 1,300 mg, Nasogastric, TID  sodium chloride, 10 mL, Intravenous, Q12H  sodium chloride, 10 mL, Intravenous, Q12H  vancomycin, 1,250 mg, Intravenous, Q24H  vitamin B-12, 1,000 mcg, Oral, Daily      Continuous Infusions:Pharmacy to dose vancomycin,   sodium chloride, 30 mL/hr, Last Rate: 30 mL/hr (10/05/21 0658)      PRN Meds:.•  acetaminophen  •  acetaminophen  •  bisacodyl  •  dextrose  •  dextrose  •  glucagon (human recombinant)  •  magnesium sulfate **OR** magnesium sulfate **OR** magnesium sulfate  •  nitroglycerin  •  ondansetron **OR** ondansetron  •  Pharmacy to dose vancomycin  •  potassium chloride  •  potassium phosphate infusion greater than 15 mMoles **OR** potassium phosphate infusion greater than 15 mMoles **OR** potassium phosphate **OR** sodium phosphate IVPB **OR** sodium phosphate IVPB  •  [COMPLETED] Insert peripheral IV **AND** sodium chloride  •  sodium chloride  •  sodium chloride  •  sodium chloride  •  sodium chloride    Assessment/Plan   Assessment & Plan     Active Hospital Problems    Diagnosis  POA   • **MRSA pneumonia (HCC) [J15.212]  Yes   • Atrial fibrillation (HCC) [I48.91]  Yes   • MRSA bacteremia [R78.81, B95.62]  Yes   • Type 2 diabetes mellitus (HCC) [E11.9]  Yes   • HCAP (healthcare-associated pneumonia) [J18.9]  Yes   • Seizure disorder (HCC) [G40.909]  Yes   • Hypernatremia [E87.0]  Yes   • Elevated troponin [R77.8]  Yes   • Acute  on chronic diastolic heart failure (HCC) [I50.33]  Yes   • Chronic anticoagulation [Z79.01]  Not Applicable   • Oropharyngeal dysphagia [R13.12]  Yes   • Metabolic encephalopathy [G93.41]  Yes      Resolved Hospital Problems    Diagnosis Date Resolved POA   • Hypercalcemia [E83.52] 09/23/2021 Yes   • Hyperkalemia [E87.5] 09/21/2021 Yes   • TI (acute kidney injury) (HCC) [N17.9] 09/23/2021 Yes   • High anion gap metabolic acidosis [E87.2] 09/23/2021 Yes   • Sepsis with acute organ dysfunction (HCC) [A41.9, R65.20] 09/23/2021 Yes        Brief Hospital Course to date:  Servando Kapadia is a 61 y.o. male with history of stroke with left-sided hemiparesis, type 2 diabetes, A. fib, seizure disorder who was sent in from his nursing home for shortness of breath.  He was admitted with septic shock due to MRSA bacteremia and pneumonia.     Discussion/plan:  PEG tube placement today.  Vancomycin level reviewed and 10.6.  Plan to follow-up on pharmacy note recommendations.  Phosphorus now returned to the normal range.  Magnesium much improved.  Restart tube feeding once cleared by gastroenterology.  Patient given IV iron for iron deficiency anemia.  B12 and folic acid were checked and are adequate.   Plan is for 4 weeks IV treatment of vancomycin.  Repeat laboratory studies tomorrow.  Once patient tolerating tube feeding via PEG we will start working on final discharge planning.  Plan to possibly try to remove restraints today or tomorrow     Dysphagia  -Received Dobbhoff tube feeding.  Speech therapy recommends remain n.p.o.  -Dr. Bansal discussed case with brother and sister in law.  They want to proceed with PEG tube.   -GI consulted for placement, noted Eliquis held for procedure     Septic shock due to MRSA bacteremia pneumonia  -On IV Vanc, plan for 4 weeks total from negative blood culture (end 10/21)  -TTE complete but not the most diagnostic as was technically difficult study.       Seizure disorder on  fosphenytoin   -Neurology was concerned Keppra contributed to sedation.  Keppra was discontinued and Dilantin increased.     ARF/hyponatremia/acidosis   -Resolved     Elevated troponin secondary to renal disease/probable type II MI  -No global dyskinesia-hypokinesis with EF 65% on 9/21/2021 echo     Type 2 diabetes  -Issues with hypoglycemia earlier in admission have resolved  -Continue sliding scale     Past history of CVA with resulting immobility on Eliquis .     Metabolic encephalopathy  -Mental status is slowly improving.  He remains very dysarthric, but follows commands, answers yes or no questions     HTN off meds with improving hypotension on midodrine  -Home blood pressure medicines held on admission due to shock.  Currently requiring midodrine 5 mg 3 times daily.  Wean as tolerated        · DVT prophylaxis , Eliquis held in anticipation of procedure.    SCDs for now  · DNR.  · Discussed with patient and nursing  · Anticipate discharge to SNU facility timing yet to be determined.         CODE STATUS:   Code Status and Medical Interventions:   Ordered at: 09/19/21 2136     Limited Support to NOT Include:    Antiarrhythmic Drugs    Cardioversion/Defibrillation    Dialysis    Intubation    NIPPV (Non-Invasive Positive Pressure Support)    Vasopressors     Level Of Support Discussed With:    Next of Kin (If No Surrogate)     Code Status:    No CPR     Medical Interventions (Level of Support Prior to Arrest):    Limited       Jacoby Triana MD  10/05/21

## 2021-10-05 NOTE — PROGRESS NOTES
"  Infectious Diseases Progress Note    Tricia Bose MD     [facility]  [place]: 16 days  Patient Identification:  Name: Servando Kapadia  Age: 61 y.o.  Sex: male  :  1960  MRN: [id number]         Primary Care Physician: No primary care provider on file.            Subjective: Had feeding tube placed earlier today.  Interval History: See consultation note.    Objective:    Scheduled Meds:atorvastatin, 40 mg, Oral, Nightly  baclofen, 5 mg, Oral, BID  cholecalciferol, 1,000 Units, Oral, Daily  fosphenytoin, 100 mg PE, Intravenous, Q8H  furosemide, 10 mg, Oral, Daily  insulin lispro, 0-9 Units, Subcutaneous, TID AC  magnesium oxide, 400 mg, Oral, Daily  metoprolol tartrate, 12.5 mg, Oral, Q12H  midodrine, 5 mg, Nasogastric, TID AC  mirtazapine, 15 mg, Oral, Nightly  multivitamin, 1 tablet, Oral, Daily  sodium bicarbonate, 1,300 mg, Nasogastric, TID  sodium chloride, 10 mL, Intravenous, Q12H  sodium chloride, 10 mL, Intravenous, Q12H  vancomycin, 1,250 mg, Intravenous, Q24H  vitamin B-12, 1,000 mcg, Oral, Daily      Continuous Infusions:Pharmacy to dose vancomycin,   sodium chloride, 30 mL/hr, Last Rate: 30 mL/hr (10/05/21 0658)        Vital signs in last 24 hours:  Temp:  [96 °F (35.6 °C)-97.6 °F (36.4 °C)] 96 °F (35.6 °C)  Heart Rate:  [64-78] 78  Resp:  [14-20] 18  BP: (101-129)/() 128/87    Intake/Output:    Intake/Output Summary (Last 24 hours) at 10/5/2021 1031  Last data filed at 10/5/2021 0532  Gross per 24 hour   Intake --   Output 2500 ml   Net -2500 ml       Exam:  /87 (BP Location: Right arm, Patient Position: Lying)   Pulse 78   Temp 96 °F (35.6 °C) (Oral)   Resp 18   Ht 162.6 cm (64.02\")   Wt 64.8 kg (142 lb 13.7 oz)   SpO2 91%   BMI 24.51 kg/m²   Patient is examined using the personal protective equipment as per guidelines from infection control for this particular patient as enacted.  Hand washing was performed before and after patient interaction.  General Appearance: " Chronically ill, awake, not answering questions, mumbles  Skin: warm and dry  HEENT: Oral mucosa is dry, nonicteric sclera nasogastric feeding tube is out  Neck: supple, no JVD  Lungs: Lateral rhonchi, unlabored breathing effort  Heart: RRR, normal S1 and S2, no S3, no rub  Abdomen: soft, no guarding, normoactive bowel, left lower/flank area pain pump site appears to be not inflamed or having any drainage.  Pain is in place with binder protecting.  : no palpable bladder, Beyer catheter anchored  Extremities: Significant contractures  Neuro: Does not engage and interact and has sequelae of previous stroke with contractures of extremities.       Data Review:    I reviewed the patient's new clinical results.  Results from last 7 days   Lab Units 10/05/21  0626 10/04/21  0635 10/03/21  0618 10/02/21  0646 09/30/21  0556 09/29/21  0453   WBC 10*3/mm3 7.91 10.32 8.62 8.88 8.99  --    HEMOGLOBIN g/dL 7.6* 8.5* 7.6* 7.3* 7.9* 7.8*   PLATELETS 10*3/mm3 324 324 268 236 225  --      Results from last 7 days   Lab Units 10/05/21  0626 10/04/21  0635 10/03/21  0618 10/02/21  0647 09/30/21  0556 09/29/21  1535   SODIUM mmol/L 139 141 140 139 140 139   POTASSIUM mmol/L 4.3 4.6 4.1 3.9 4.5 4.0   CHLORIDE mmol/L 105 105 106 105 109* 110*   CO2 mmol/L 30.5* 30.9* 27.8 28.7 27.1 25.5   BUN mg/dL 18 19 17 17 15 14   CREATININE mg/dL 0.35* 0.43* 0.39* 0.37* 0.33* 0.36*   CALCIUM mg/dL 9.0 9.3 8.6 8.4* 8.3* 8.1*   GLUCOSE mg/dL 69 74 86 124* 90 133*       Microbiology Results (last 10 days)     Procedure Component Value - Date/Time    COVID PRE-OP / PRE-PROCEDURE SCREENING ORDER (NO ISOLATION) - Swab, Nasopharynx [[id number]]  (Normal) Collected: 10/03/21 0824    Lab Status: Final result Specimen: Swab from Nasopharynx Updated: 10/03/21 0955    Narrative:      The following orders were created for panel order COVID PRE-OP / PRE-PROCEDURE SCREENING ORDER (NO ISOLATION) - Swab, Nasopharynx.  Procedure                                Abnormality         Status                     ---------                               -----------         ------                     COVID-19,[facility]...[[id number]]  Normal              Final result                 Please view results for these tests on the individual orders.    COVID-19,[facility]HOU[facility] CEPHEID/SANDRA NP SWAB IN TRANSPORT MEDIA 8-12 HR TAT - Swab, Nasopharynx [[id number]]  (Normal) Collected: 10/03/21 0824    Lab Status: Final result Specimen: Swab from Nasopharynx Updated: 10/03/21 0955     COVID19 Not Detected    Narrative:      Fact sheet for providers: https://www.fda.gov/media/059134/download    Fact sheet for patients: https://www.fda.gov/media/797531/download    Test performed by PCR.          Assessment:    MRSA pneumonia (HCC)    Metabolic encephalopathy    Oropharyngeal dysphagia    Chronic anticoagulation    HCAP (healthcare-associated pneumonia)    Seizure disorder (HCC)    Hypernatremia    Elevated troponin    Acute on chronic diastolic heart failure (HCC)    Type 2 diabetes mellitus (HCC)    MRSA bacteremia    Atrial fibrillation (HCC)  1-MRSA bacteremic sepsis likely secondary to healthcare associated pneumonia overall improved with current treatment.  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        Recommendations/Discussions:  · No further work-up for origin/source of and secondary seeding of MRSA bacteremia unless he shows objective evidence of clinical deterioration.    · Given his inability to participate in review of systems and inability to decide which organ system needs to be worked up for secondary seeding or source of MRSA bacteremia and his propensity for future recurrent hospitalizations I would recommend completing treatment of bacteremic MRSA sepsis with 4 weeks of IV vancomycin from last negative blood culture.    · Continue aggressive supportive care and prevent  complications of immobility and dysphagia if possible.  · Prognosis is guarded to poor with risk of future recurrent hospitalization      Tricia Bose MD  10/5/2021  10:31 EDT    Much of this encounter note is an electronic transcription/translation of spoken language to printed text. The electronic translation of spoken language may permit erroneous, or at times, nonsensical words or phrases to be inadvertently transcribed; Although I have reviewed the note for such errors, some may still exist     Labs

## 2021-10-06 ENCOUNTER — INPATIENT HOSPITAL (OUTPATIENT)
Dept: URBAN - METROPOLITAN AREA HOSPITAL 113 | Facility: HOSPITAL | Age: 61
End: 2021-10-06
Payer: MEDICAID

## 2021-10-06 DIAGNOSIS — G93.41 METABOLIC ENCEPHALOPATHY: ICD-10-CM

## 2021-10-06 DIAGNOSIS — J15.212 PNEUMONIA DUE TO METHICILLIN RESISTANT STAPHYLOCOCCUS AUREUS: ICD-10-CM

## 2021-10-06 DIAGNOSIS — R13.12 DYSPHAGIA, OROPHARYNGEAL PHASE: ICD-10-CM

## 2021-10-06 DIAGNOSIS — E87.1 HYPO-OSMOLALITY AND HYPONATREMIA: ICD-10-CM

## 2021-10-06 DIAGNOSIS — R79.89 OTHER SPECIFIED ABNORMAL FINDINGS OF BLOOD CHEMISTRY: ICD-10-CM

## 2021-10-06 DIAGNOSIS — E11.9 TYPE 2 DIABETES MELLITUS WITHOUT COMPLICATIONS: ICD-10-CM

## 2021-10-06 DIAGNOSIS — I50.33 ACUTE ON CHRONIC DIASTOLIC (CONGESTIVE) HEART FAILURE: ICD-10-CM

## 2021-10-06 DIAGNOSIS — Z93.1 GASTROSTOMY STATUS: ICD-10-CM

## 2021-10-06 DIAGNOSIS — B95.62 METHICILLIN RESISTANT STAPHYLOCOCCUS AUREUS INFECTION AS THE: ICD-10-CM

## 2021-10-06 DIAGNOSIS — R56.9 UNSPECIFIED CONVULSIONS: ICD-10-CM

## 2021-10-06 LAB
GLUCOSE BLDC GLUCOMTR-MCNC: 130 MG/DL (ref 70–130)
GLUCOSE BLDC GLUCOMTR-MCNC: 132 MG/DL (ref 70–130)
GLUCOSE BLDC GLUCOMTR-MCNC: 135 MG/DL (ref 70–130)
GLUCOSE BLDC GLUCOMTR-MCNC: 148 MG/DL (ref 70–130)
SARS-COV-2 ORF1AB RESP QL NAA+PROBE: NOT DETECTED

## 2021-10-06 PROCEDURE — 25010000002 FOSPHENYTOIN 100 MG PE/2ML SOLUTION: Performed by: HOSPITALIST

## 2021-10-06 PROCEDURE — U0004 COV-19 TEST NON-CDC HGH THRU: HCPCS | Performed by: INTERNAL MEDICINE

## 2021-10-06 PROCEDURE — 99232 SBSQ HOSP IP/OBS MODERATE 35: CPT | Performed by: INTERNAL MEDICINE

## 2021-10-06 PROCEDURE — U0005 INFEC AGEN DETEC AMPLI PROBE: HCPCS | Performed by: INTERNAL MEDICINE

## 2021-10-06 PROCEDURE — 82962 GLUCOSE BLOOD TEST: CPT

## 2021-10-06 PROCEDURE — 25010000002 VANCOMYCIN 10 G RECONSTITUTED SOLUTION: Performed by: HOSPITALIST

## 2021-10-06 RX ORDER — LEVETIRACETAM 500 MG/1
500 TABLET ORAL EVERY 12 HOURS SCHEDULED
Status: DISCONTINUED | OUTPATIENT
Start: 2021-10-06 | End: 2021-10-06

## 2021-10-06 RX ORDER — PHENYTOIN 125 MG/5ML
100 SUSPENSION ORAL EVERY 12 HOURS SCHEDULED
Status: DISCONTINUED | OUTPATIENT
Start: 2021-10-06 | End: 2021-10-07 | Stop reason: HOSPADM

## 2021-10-06 RX ORDER — PHENYTOIN SODIUM 100 MG/1
100 CAPSULE, EXTENDED RELEASE ORAL EVERY 12 HOURS SCHEDULED
Status: DISCONTINUED | OUTPATIENT
Start: 2021-10-06 | End: 2021-10-06

## 2021-10-06 RX ORDER — LEVETIRACETAM 100 MG/ML
500 SOLUTION ORAL EVERY 12 HOURS SCHEDULED
Status: DISCONTINUED | OUTPATIENT
Start: 2021-10-06 | End: 2021-10-07 | Stop reason: HOSPADM

## 2021-10-06 RX ADMIN — SODIUM BICARBONATE 1300 MG: 650 TABLET ORAL at 09:48

## 2021-10-06 RX ADMIN — BACLOFEN 5 MG: 10 TABLET ORAL at 21:13

## 2021-10-06 RX ADMIN — MIDODRINE HYDROCHLORIDE 5 MG: 5 TABLET ORAL at 17:30

## 2021-10-06 RX ADMIN — BACLOFEN 5 MG: 10 TABLET ORAL at 09:47

## 2021-10-06 RX ADMIN — LEVETIRACETAM 500 MG: 100 SOLUTION ORAL at 21:13

## 2021-10-06 RX ADMIN — Medication 1 TABLET: at 09:48

## 2021-10-06 RX ADMIN — SODIUM CHLORIDE, PRESERVATIVE FREE 10 ML: 5 INJECTION INTRAVENOUS at 21:13

## 2021-10-06 RX ADMIN — SODIUM CHLORIDE, PRESERVATIVE FREE 10 ML: 5 INJECTION INTRAVENOUS at 12:23

## 2021-10-06 RX ADMIN — MIDODRINE HYDROCHLORIDE 5 MG: 5 TABLET ORAL at 06:32

## 2021-10-06 RX ADMIN — SODIUM CHLORIDE, PRESERVATIVE FREE 10 ML: 5 INJECTION INTRAVENOUS at 09:50

## 2021-10-06 RX ADMIN — MIRTAZAPINE 15 MG: 15 TABLET, FILM COATED ORAL at 21:14

## 2021-10-06 RX ADMIN — VANCOMYCIN HYDROCHLORIDE 1250 MG: 10 INJECTION, POWDER, LYOPHILIZED, FOR SOLUTION INTRAVENOUS at 12:21

## 2021-10-06 RX ADMIN — SODIUM BICARBONATE 1300 MG: 650 TABLET ORAL at 17:30

## 2021-10-06 RX ADMIN — METOPROLOL TARTRATE 12.5 MG: 25 TABLET, FILM COATED ORAL at 09:48

## 2021-10-06 RX ADMIN — ATORVASTATIN CALCIUM 40 MG: 20 TABLET, FILM COATED ORAL at 21:13

## 2021-10-06 RX ADMIN — Medication 1000 MCG: at 09:48

## 2021-10-06 RX ADMIN — MAGNESIUM OXIDE 400 MG (241.3 MG MAGNESIUM) TABLET 400 MG: TABLET at 10:07

## 2021-10-06 RX ADMIN — MIDODRINE HYDROCHLORIDE 5 MG: 5 TABLET ORAL at 12:21

## 2021-10-06 RX ADMIN — Medication 1000 UNITS: at 09:48

## 2021-10-06 RX ADMIN — PHENYTOIN 100 MG: 125 SUSPENSION ORAL at 21:13

## 2021-10-06 RX ADMIN — FUROSEMIDE 10 MG: 20 TABLET ORAL at 09:46

## 2021-10-06 RX ADMIN — FOSPHENYTOIN SODIUM 100 MG PE: 50 INJECTION, SOLUTION INTRAMUSCULAR; INTRAVENOUS at 03:24

## 2021-10-06 RX ADMIN — SODIUM BICARBONATE 1300 MG: 650 TABLET ORAL at 21:14

## 2021-10-06 RX ADMIN — METOPROLOL TARTRATE 12.5 MG: 25 TABLET, FILM COATED ORAL at 21:13

## 2021-10-06 RX ADMIN — ACETAMINOPHEN 650 MG: 325 TABLET, FILM COATED ORAL at 10:07

## 2021-10-06 NOTE — PLAN OF CARE
Goal Outcome Evaluation:  Plan of Care Reviewed With: patient        Progress: no change  Outcome Summary: VSS on room air. Restraints d/c at 1130 tolerating well. Q2 turn and repositioning. Beyer output good. TF increased 50ml a hour per orders. Resting well in bed at this time. Will continue to monitor.

## 2021-10-06 NOTE — PROGRESS NOTES
"[facility]  Clinical Pharmacy Department     Vancomycin Pharmacokinetic Note    Servando Kapadia is a 61 y.o. male who is on day 18 of pharmacy to dose vancomycin for sepsis secondary to MRSA bacteremia/PNA.    Target level: AUC24 400-600  Consulting Provider:  Dr Cornejo  Current Vancomycin Dose:  1250 mg IV q24h  Planned Duration of Therapy: 10/21/21    Allergies  Allergies as of 09/19/2021   • (No Known Allergies)       Microbiology:  Microbiology Results (last 10 days)     Procedure Component Value - Date/Time    COVID PRE-OP / PRE-PROCEDURE SCREENING ORDER (NO ISOLATION) - Swab, Nasopharynx [[id number]]  (Normal) Collected: 10/03/21 0824    Lab Status: Final result Specimen: Swab from Nasopharynx Updated: 10/03/21 0955    Narrative:      The following orders were created for panel order COVID PRE-OP / PRE-PROCEDURE SCREENING ORDER (NO ISOLATION) - Swab, Nasopharynx.  Procedure                               Abnormality         Status                     ---------                               -----------         ------                     COVID-19,[facility]HOUSE...[[id number]]  Normal              Final result                 Please view results for these tests on the individual orders.    COVID-19,[facility]HOUSE CEPHEID/SANDRA NP SWAB IN TRANSPORT MEDIA 8-12 HR TAT - Swab, Nasopharynx [[id number]]  (Normal) Collected: 10/03/21 0824    Lab Status: Final result Specimen: Swab from Nasopharynx Updated: 10/03/21 0955     COVID19 Not Detected    Narrative:      Fact sheet for providers: https://www.fda.gov/media/541971/download    Fact sheet for patients: https://www.fda.gov/media/851277/download    Test performed by PCR.            Vitals/Labs/I&O  162.6 cm (64.02\")  64.8 kg (142 lb 13.7 oz)  Body mass index is 24.51 kg/m².   Temp:  [96.2 °F (35.7 °C)-97.9 °F (36.6 °C)] 97.9 °F (36.6 °C)  Heart Rate:  [72-89] 82  Resp:  [16-24] 16  BP: ()/(69-91) 126/91    Results from last 7 days   Lab Units " "10/05/21  0626 10/04/21  0635 10/03/21  0618   WBC 10*3/mm3 7.91 10.32 8.62       Estimated Creatinine Clearance: 203.1 mL/min (A) (by C-G formula based on SCr of 0.35 mg/dL (L)).  Results from last 7 days   Lab Units 10/05/21  0626 10/04/21  0635 10/03/21  0618   BUN mg/dL 18 19 17   CREATININE mg/dL 0.35* 0.43* 0.39*     Intake & Output (last 3 days)       10/03 0701 - 10/04 0700 10/04 0701 - 10/05 0700 10/05 0701 - 10/06 0700 10/06 0701 - 10/07 0700    P.O. 0       I.V. (mL/kg) 260 (3.8)       Other 90 30 100     NG/GT 2470       IV Piggyback 600       Total Intake(mL/kg) 3420 (50) 30 (0.5) 100 (1.5)     Urine (mL/kg/hr) 4000 (2.4) 2500 (1.6) 1850 (1.2)     Stool 0       Total Output 4000 2500 1850     Net -580 -2470 -1750             Stool Unmeasured Occurrence 2 x             Vancomycin Dosing and Level History:  9/19 2200 1500mg IV x1  9/21 random level at 1132 = 11 mcg/mL, 1524 750mg IV x1  9/22 random level at 060 = 15.9 mcg/mL, 1052, 2216 500mg IV q12h  9/23 1238, 2139, trough level at 2140 = 17.8 mcg/mL  9/24 random level at 0500 (unclear if processed using 9/24 or 9/25 sample - see 9/25 [facility] note) = 22.8 mcg/mL, 0945, 2115  9/25 1334, scheduled dosing switched to intermittent  9/26 0024 750mg IV x1, random level at 1244 = 21.1 mcg/mL (~12hr level)  9/26 2032 500mg IV x1  9/27 0628 Vanc level: 18.8 (~10 hr level), Vancomycin 1000mg IV x1  9/28 \"trough\" at 0618 = 18.4 mcg/mL, dose at 1606 1000mg IV q24h  9/29 0941  9/30 trough at 0906 = 18.5 mcg/mL, dose at 1124  10/1 1339  10/2 0958  10/3 1146  10/4 1229 Trough level = 10.6 mcg/mL, dose at 1330   10/4 1330 1000 mg   10/5 1213 1250 mg  10/6 1221 1250 mg            Results from last 7 days   Lab Units 10/04/21  1229 09/30/21  0906   VANCOMYCIN TR mcg/mL 10.60 18.50       Assessment:  Bayesian analysis of the most recent level(s) using [employer] provides the following patient-specific pharmacokinetic parameters:   CL: 2.93 L/h   Vd: 78.3 L   T1/2: 20.6 h  If " "the predicted trough on this regimen is not within what was previously considered a \"target trough range\", the AUC24 (a stronger predictor of vancomycin efficacy) is predicted to achieve the accepted target of 400-600 mg*h/L. To best balance efficacy and toxicity, we will be targeting AUC24 in this patient rather than steady-state trough levels.     Continuing the regimen of 1250 mg (19.3 mg/kg) IV every 24 hours gives a predicted steady-state trough of 13.1 mg/L and AUC24 of 441 mg*h/L.    Recommendations/Plan:  - Continue vancomycin regimen to 1250 mg IV every 24 hours   - Obtain vancomycin level on 10/7 at 1130 or sooner if acute changes  - Continue to monitor SCr      Thank you for involving pharmacy in this patient's care. Please contact pharmacy with any questions or concerns.                           Swapnil Gillespie [facility]  Clinical Pharmacist  10/06/21 13:28 EDT    "

## 2021-10-06 NOTE — PROGRESS NOTES
"  Infectious Diseases Progress Note    Tricia Bose MD     [facility]  [place]: 17 days  Patient Identification:  Name: Servando Kapadia  Age: 61 y.o.  Sex: male  :  1960  MRN: [id number]         Primary Care Physician: No primary care provider on file.            Subjective: Moans and attempts to converse.  Interval History: See consultation note.  Status post PEG on 10/5/2021  Objective:    Scheduled Meds:atorvastatin, 40 mg, Oral, Nightly  baclofen, 5 mg, Oral, BID  cholecalciferol, 1,000 Units, Oral, Daily  furosemide, 10 mg, Oral, Daily  insulin lispro, 0-9 Units, Subcutaneous, TID AC  levETIRAcetam, 500 mg, Oral, Q12H  magnesium oxide, 400 mg, Oral, Daily  metoprolol tartrate, 12.5 mg, Oral, Q12H  midodrine, 5 mg, Nasogastric, TID AC  mirtazapine, 15 mg, Oral, Nightly  multivitamin, 1 tablet, Oral, Daily  phenytoin, 100 mg, Oral, Q12H  sodium bicarbonate, 1,300 mg, Nasogastric, TID  sodium chloride, 10 mL, Intravenous, Q12H  sodium chloride, 10 mL, Intravenous, Q12H  vancomycin, 1,250 mg, Intravenous, Q24H  vitamin B-12, 1,000 mcg, Oral, Daily      Continuous Infusions:Pharmacy to dose vancomycin,   sodium chloride, 30 mL/hr, Last Rate: 30 mL/hr (10/05/21 0658)        Vital signs in last 24 hours:  Temp:  [96.2 °F (35.7 °C)-97.9 °F (36.6 °C)] 97.9 °F (36.6 °C)  Heart Rate:  [72-89] 82  Resp:  [16-24] 16  BP: ()/(69-91) 126/91    Intake/Output:    Intake/Output Summary (Last 24 hours) at 10/6/2021 1300  Last data filed at 10/6/2021 0400  Gross per 24 hour   Intake 100 ml   Output 1850 ml   Net -1750 ml       Exam:  /91 (BP Location: Left arm, Patient Position: Lying)   Pulse 82   Temp 97.9 °F (36.6 °C) (Oral)   Resp 16   Ht 162.6 cm (64.02\")   Wt 64.8 kg (142 lb 13.7 oz)   SpO2 97%   BMI 24.51 kg/m²   Patient is examined using the personal protective equipment as per guidelines from infection control for this particular patient as enacted.  Hand washing was performed before " and after patient interaction.  General Appearance: Chronically ill, awake, not answering questions, mumbles  Skin: warm and dry  HEENT: Oral mucosa is dry, nonicteric sclera nasogastric feeding tube is out  Neck: supple, no JVD  Lungs: Lateral rhonchi, unlabored breathing effort  Heart: RRR, normal S1 and S2, no S3, no rub  Abdomen: soft, no guarding, normoactive bowel, left lower/flank area pain pump site appears to be not inflamed or having any drainage.  Pain is in place with binder protecting.  : no palpable bladder, Beyer catheter anchored  Extremities: Significant contractures  Neuro: Does not engage and interact and has sequelae of previous stroke with contractures of extremities.       Data Review:    I reviewed the patient's new clinical results.  Results from last 7 days   Lab Units 10/05/21  0626 10/04/21  0635 10/03/21  0618 10/02/21  0646 09/30/21  0556   WBC 10*3/mm3 7.91 10.32 8.62 8.88 8.99   HEMOGLOBIN g/dL 7.6* 8.5* 7.6* 7.3* 7.9*   PLATELETS 10*3/mm3 324 324 268 236 225     Results from last 7 days   Lab Units 10/05/21  0626 10/04/21  0635 10/03/21  0618 10/02/21  0647 09/30/21  0556 09/29/21  1535   SODIUM mmol/L 139 141 140 139 140 139   POTASSIUM mmol/L 4.3 4.6 4.1 3.9 4.5 4.0   CHLORIDE mmol/L 105 105 106 105 109* 110*   CO2 mmol/L 30.5* 30.9* 27.8 28.7 27.1 25.5   BUN mg/dL 18 19 17 17 15 14   CREATININE mg/dL 0.35* 0.43* 0.39* 0.37* 0.33* 0.36*   CALCIUM mg/dL 9.0 9.3 8.6 8.4* 8.3* 8.1*   GLUCOSE mg/dL 69 74 86 124* 90 133*       Microbiology Results (last 10 days)     Procedure Component Value - Date/Time    COVID PRE-OP / PRE-PROCEDURE SCREENING ORDER (NO ISOLATION) - Swab, Nasopharynx [[id number]]  (Normal) Collected: 10/03/21 0824    Lab Status: Final result Specimen: Swab from Nasopharynx Updated: 10/03/21 0955    Narrative:      The following orders were created for panel order COVID PRE-OP / PRE-PROCEDURE SCREENING ORDER (NO ISOLATION) - Swab, Nasopharynx.  Procedure                                Abnormality         Status                     ---------                               -----------         ------                     COVID-19,[facility]...[[id number]]  Normal              Final result                 Please view results for these tests on the individual orders.    COVID-19,[facility] CEPHEID/SANDRA NP SWAB IN TRANSPORT MEDIA 8-12 HR TAT - Swab, Nasopharynx [[id number]]  (Normal) Collected: 10/03/21 0824    Lab Status: Final result Specimen: Swab from Nasopharynx Updated: 10/03/21 0955     COVID19 Not Detected    Narrative:      Fact sheet for providers: https://www.fda.gov/media/696683/download    Fact sheet for patients: https://www.fda.gov/media/226736/download    Test performed by PCR.          Assessment:    MRSA pneumonia (HCC)    Metabolic encephalopathy    Oropharyngeal dysphagia    Chronic anticoagulation    HCAP (healthcare-associated pneumonia)    Seizure disorder (HCC)    Hypernatremia    Elevated troponin    Acute on chronic diastolic heart failure (HCC)    Type 2 diabetes mellitus (HCC)    MRSA bacteremia    Atrial fibrillation (HCC)  1-MRSA bacteremic sepsis likely secondary to healthcare associated pneumonia overall improved with current treatment.  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        Recommendations/Discussions:  · No further work-up for origin/source of and secondary seeding of MRSA bacteremia unless he shows objective evidence of clinical deterioration.    · Given his inability to participate in review of systems and inability to decide which organ system needs to be worked up for secondary seeding or source of MRSA bacteremia and his propensity for future recurrent hospitalizations I would recommend completing treatment of bacteremic MRSA sepsis with 4 weeks of IV vancomycin from last negative blood culture.    · Continue aggressive supportive care and  prevent complications of immobility and dysphagia if possible.  · Prognosis is guarded to poor with risk of future recurrent hospitalization      Tricia Bose MD  10/6/2021  13:00 EDT    Much of this encounter note is an electronic transcription/translation of spoken language to printed text. The electronic translation of spoken language may permit erroneous, or at times, nonsensical words or phrases to be inadvertently transcribed; Although I have reviewed the note for such errors, some may still exist

## 2021-10-06 NOTE — PROGRESS NOTES
Continued Stay Note  [facility]     Patient Name: Servando Kapadia  MRN: [id number]  Today's Date: 10/6/2021    Admit Date: 9/19/2021    Discharge Plan     Row Name 10/06/21 1120       Plan    Plan  [facility] skilled rehab pending Covid test will need EMS transport    Plan Comments  Spoke with Janelle with [facility].  [facility] accepts patient and he is from a long term care bed.  He will return to [facility] skilled per Janelle.  He needs a Covid test and nursing notified and will obtain order for test for return to [facility].  Spoke with JAVID Barney Kang [phone].  He prefers that the patient return to [facility] [place] and prefers EMS transport.  Packet with report and fax is in the chart......................Tracie Almendarez RN        Discharge Codes    No documentation.       Expected Discharge Date and Time     Expected Discharge Date Expected Discharge Time    Oct 7, 2021             Tracie Almendarez RN

## 2021-10-06 NOTE — PLAN OF CARE
Goal Outcome Evaluation:        TFs of Diabetisource at 20 ml/hr and advancing by 10 ml q 4 hours to goal rate of 70 ml/hr.  Received Dobbhoff tube this morning. Tolerated well.   Speech therapy recommends remain n.p.o. Septic shock due to MRSA bacteremia pneumonia. Plan for 4 weeks total from negative blood culture (end 10/21). Order for restraints was not renewed in order to proceed with final discharge plan.

## 2021-10-06 NOTE — PROGRESS NOTES
[facility]     Progress Note    Patient Name: Servando Kapadia  : 1960  MRN: [id number]  Primary Care Physician:  No primary care provider on file.  Date of admission: 2021    Subjective   Subjective     Chief Complaint: dysphagia    History of Present Illness  Patient Reports he is smiling today.  Seems comfortable currently     Review of Systems   Respiratory: Positive for cough and shortness of breath.    Neurological: Positive for weakness.   Psychiatric/Behavioral: Positive for confusion.   All other systems reviewed and are negative.      Objective   Objective     Vitals:   Temp:  [96.2 °F (35.7 °C)-97.9 °F (36.6 °C)] 97.9 °F (36.6 °C)  Heart Rate:  [72-89] 82  Resp:  [16-24] 16  BP: ()/(69-91) 126/91    Physical Exam  HENT:      Right Ear: External ear normal.      Left Ear: External ear normal.      Mouth/Throat:      Pharynx: Oropharynx is clear.   Eyes:      Conjunctiva/sclera: Conjunctivae normal.   Cardiovascular:      Rate and Rhythm: Normal rate.      Pulses: Normal pulses.   Pulmonary:      Effort: Pulmonary effort is normal.   Abdominal:      General: Bowel sounds are normal.      Comments: g tube site in good order    Neurological:      Mental Status: He is alert.          Result Review    Result Review:  I have personally reviewed the results from the time of this admission to 10/6/2021 12:07 EDT and agree with these findings:  []  Laboratory  []  Microbiology  []  Radiology  []  EKG/Telemetry   []  Cardiology/Vascular   []  Pathology  []  Old records  []  Other:      Assessment/Plan   Assessment / Plan     Brief Patient Summary:  Servando Kapadia is a 61 y.o. male   orpharyngeal dysphagia  S/p gastrostomy tube placement     Active Hospital Problems:  Active Hospital Problems    Diagnosis    • **MRSA pneumonia (HCC)    • Atrial fibrillation (HCC)    • MRSA bacteremia    • Type 2 diabetes mellitus (HCC)    • HCAP (healthcare-associated pneumonia)    • Seizure disorder (HCC)     • Hypernatremia    • Elevated troponin    • Acute on chronic diastolic heart failure (HCC)    • Chronic anticoagulation    • Oropharyngeal dysphagia    • Metabolic encephalopathy      Plan: stable currently will see prn       DVT prophylaxis:  Medical and mechanical DVT prophylaxis orders are present.    CODE STATUS:    Limited Support to NOT Include: Antiarrhythmic Drugs; Cardioversion/Defibrillation; Dialysis; Intubation; NIPPV (Non-Invasive Positive Pressure Support); Vasopressors  Level Of Support Discussed With: Next of Kin (If No Surrogate)  Code Status: No CPR  Medical Interventions (Level of Support Prior to Arrest): Limited    Disposition:  I expect patient to be discharged soon     Electronically signed by Lion Weber MD, 10/06/21, 12:07 PM EDT.

## 2021-10-06 NOTE — PROGRESS NOTES
I have been contacted by the attending Dr. Triana that this patient may be going home and we need to change the Cerebyx back to Dilantin.  I have fully reviewed his chart and of course I have seen him in consultation previously and would note that he should go back on the exact same seizure medicine he was on when he first came to the hospital which is Keppra 500 mg p.o. twice daily and Dilantin 100 mg p.o.twice daily.  I have therefore written for this and will DC the Cerebyx thanks  Wilfrid Cobb MD

## 2021-10-06 NOTE — PROGRESS NOTES
[facility] Medicine Services  PROGRESS NOTE    Patient Name: Servando Kapadia  : 1960  MRN: [id number]    Date of Admission: 2021  Primary Care Physician: No primary care provider on file.    Subjective   Subjective     CC:  Follow-up pneumonia    HPI:  Patient resting comfortable.  Currently calm.  PEG tube in place.    Review of Systems  Limited due to mental status.    Objective   Objective     Vital Signs:   Temp:  [96.2 °F (35.7 °C)-97.9 °F (36.6 °C)] 97.9 °F (36.6 °C)  Heart Rate:  [72-89] 82  Resp:  [16-24] 16  BP: ()/(69-91) 126/91        Physical Exam:  Constitutional:Awake, alert, chronically ill-appearing  HENT: NCAT, mucous membranes moist, neck supple  Respiratory: Occasional coarse breath sounds, normal pulmonary effort and no respiratory distress  Cardiovascular: RRR, normal radial pulses  Gastrointestinal: PEG tube in place, positive bowel sounds, soft, nontender, nondistended  Musculoskeletal: Normal musculature for age, no lower extremity edema, BMI 25  Psychiatric: Calm affect, not conversational but occasional verbalization  Neurologic: Speaks occasional words, difficult to assess orientation, can follow some simple commands  Skin: No rashes or jaundice, warm      Results Reviewed:  Results from last 7 days   Lab Units 10/05/21  0626 10/04/21  0635 10/03/21  0618   WBC 10*3/mm3 7.91 10.32 8.62   HEMOGLOBIN g/dL 7.6* 8.5* 7.6*   HEMATOCRIT % 23.6* 26.4* 23.4*   PLATELETS 10*3/mm3 324 324 268   INR   --  0.94  --      Results from last 7 days   Lab Units 10/05/21  0626 10/04/21  0635 10/03/21  0618 10/02/21  0647 10/02/21  0647   SODIUM mmol/L 139 141 140   < > 139   POTASSIUM mmol/L 4.3 4.6 4.1   < > 3.9   CHLORIDE mmol/L 105 105 106   < > 105   CO2 mmol/L 30.5* 30.9* 27.8   < > 28.7   BUN mg/dL 18 19 17   < > 17   CREATININE mg/dL 0.35* 0.43* 0.39*   < > 0.37*   GLUCOSE mg/dL 69 74 86   < > 124*   CALCIUM mg/dL 9.0 9.3 8.6   < > 8.4*   ALT (SGPT) U/L  --   --   --   --  12    AST (SGOT) U/L  --   --   --   --  13    < > = values in this interval not displayed.     Estimated Creatinine Clearance: 203.1 mL/min (A) (by C-G formula based on SCr of 0.35 mg/dL (L)).    Microbiology Results Abnormal     Procedure Component Value - Date/Time    COVID PRE-OP / PRE-PROCEDURE SCREENING ORDER (NO ISOLATION) - Swab, Nasopharynx [[id number]]  (Normal) Collected: 10/03/21 0824    Lab Status: Final result Specimen: Swab from Nasopharynx Updated: 10/03/21 0955    Narrative:      The following orders were created for panel order COVID PRE-OP / PRE-PROCEDURE SCREENING ORDER (NO ISOLATION) - Swab, Nasopharynx.  Procedure                               Abnormality         Status                     ---------                               -----------         ------                     COVID-19,[facility]...[[id number]]  Normal              Final result                 Please view results for these tests on the individual orders.    COVID-19,[facility] CEPHEID/SANDRA NP SWAB IN TRANSPORT MEDIA 8-12 HR TAT - Swab, Nasopharynx [[id number]]  (Normal) Collected: 10/03/21 0824    Lab Status: Final result Specimen: Swab from Nasopharynx Updated: 10/03/21 0955     COVID19 Not Detected    Narrative:      Fact sheet for providers: https://www.fda.gov/media/306174/download    Fact sheet for patients: https://www.fda.gov/media/132177/download    Test performed by PCR.    Blood Culture - Blood, Arm, Left [[id number]] Collected: 09/23/21 1322    Lab Status: Final result Specimen: Blood from Arm, Left Updated: 09/28/21 1346     Blood Culture No growth at 5 days    Blood Culture - Blood, Blood, PICC Line [[id number]] Collected: 09/23/21 0240    Lab Status: Final result Specimen: Blood, PICC Line Updated: 09/28/21 0301     Blood Culture No growth at 5 days    Blood Culture - Blood, Arm, Right [[id number]] Collected: 09/19/21 1640    Lab Status: Final result Specimen: Blood from Arm, Right Updated: 09/24/21 1700     Blood  Culture No growth at 5 days    Respiratory Panel PCR w/COVID-19(SARS-CoV-2) [facility] In-House, NP Swab in UTM/VTM, 3-4 HR TAT - Swab, Nasopharynx [[id number]]  (Normal) Collected: 09/19/21 1648    Lab Status: Final result Specimen: Swab from Nasopharynx Updated: 09/19/21 3350     ADENOVIRUS, PCR Not Detected     Coronavirus 229E Not Detected     Coronavirus HKU1 Not Detected     Coronavirus NL63 Not Detected     Coronavirus OC43 Not Detected     COVID19 Not Detected     Human Metapneumovirus Not Detected     Human Rhinovirus/Enterovirus Not Detected     Influenza A PCR Not Detected     Influenza B PCR Not Detected     Parainfluenza Virus 1 Not Detected     Parainfluenza Virus 2 Not Detected     Parainfluenza Virus 3 Not Detected     Parainfluenza Virus 4 Not Detected     RSV, PCR Not Detected     Bordetella pertussis pcr Not Detected     Bordetella parapertussis PCR Not Detected     Chlamydophila pneumoniae PCR Not Detected     Mycoplasma pneumo by PCR Not Detected    Narrative:      In the setting of a positive respiratory panel with a viral infection PLUS a negative procalcitonin without other underlying concern for bacterial infection, consider observing off antibiotics or discontinuation of antibiotics and continue supportive care. If the respiratory panel is positive for atypical bacterial infection (Bordetella pertussis, Chlamydophila pneumoniae, or Mycoplasma pneumoniae), consider antibiotic de-escalation to target atypical bacterial infection.          Imaging Results (Last 24 Hours)     ** No results found for the last 24 hours. **          Results for orders placed during the hospital encounter of 09/19/21    Adult Transthoracic Echo Complete W/ Cont if Necessary Per Protocol    Interpretation Summary  · Estimated left ventricular EF = 65% Left ventricular systolic function is normal.  · The left ventricular cavity is small in size.  · Left ventricular diastolic function was normal.  ·  Very technically difficult study with limited views available      I have reviewed the medications:  Scheduled Meds:atorvastatin, 40 mg, Oral, Nightly  baclofen, 5 mg, Oral, BID  cholecalciferol, 1,000 Units, Oral, Daily  fosphenytoin, 100 mg PE, Intravenous, Q8H  furosemide, 10 mg, Oral, Daily  insulin lispro, 0-9 Units, Subcutaneous, TID AC  magnesium oxide, 400 mg, Oral, Daily  metoprolol tartrate, 12.5 mg, Oral, Q12H  midodrine, 5 mg, Nasogastric, TID AC  mirtazapine, 15 mg, Oral, Nightly  multivitamin, 1 tablet, Oral, Daily  sodium bicarbonate, 1,300 mg, Nasogastric, TID  sodium chloride, 10 mL, Intravenous, Q12H  sodium chloride, 10 mL, Intravenous, Q12H  vancomycin, 1,250 mg, Intravenous, Q24H  vitamin B-12, 1,000 mcg, Oral, Daily      Continuous Infusions:Pharmacy to dose vancomycin,   sodium chloride, 30 mL/hr, Last Rate: 30 mL/hr (10/05/21 0658)      PRN Meds:.•  acetaminophen  •  acetaminophen  •  bisacodyl  •  dextrose  •  dextrose  •  glucagon (human recombinant)  •  magnesium sulfate **OR** magnesium sulfate **OR** magnesium sulfate  •  nitroglycerin  •  ondansetron **OR** ondansetron  •  Pharmacy to dose vancomycin  •  potassium chloride  •  potassium phosphate infusion greater than 15 mMoles **OR** potassium phosphate infusion greater than 15 mMoles **OR** potassium phosphate **OR** sodium phosphate IVPB **OR** sodium phosphate IVPB  •  [COMPLETED] Insert peripheral IV **AND** sodium chloride  •  sodium chloride  •  sodium chloride  •  sodium chloride  •  sodium chloride    Assessment/Plan   Assessment & Plan     Active Hospital Problems    Diagnosis  POA   • **MRSA pneumonia (HCC) [J15.212]  Yes   • Atrial fibrillation (HCC) [I48.91]  Yes   • MRSA bacteremia [R78.81, B95.62]  Yes   • Type 2 diabetes mellitus (HCC) [E11.9]  Yes   • HCAP (healthcare-associated pneumonia) [J18.9]  Yes   • Seizure disorder (HCC) [G40.909]  Yes   • Hypernatremia [E87.0]  Yes   • Elevated troponin [R77.8]  Yes   • Acute  on chronic diastolic heart failure (HCC) [I50.33]  Yes   • Chronic anticoagulation [Z79.01]  Not Applicable   • Oropharyngeal dysphagia [R13.12]  Yes   • Metabolic encephalopathy [G93.41]  Yes      Resolved Hospital Problems    Diagnosis Date Resolved POA   • Hypercalcemia [E83.52] 09/23/2021 Yes   • Hyperkalemia [E87.5] 09/21/2021 Yes   • TI (acute kidney injury) (HCC) [N17.9] 09/23/2021 Yes   • High anion gap metabolic acidosis [E87.2] 09/23/2021 Yes   • Sepsis with acute organ dysfunction (HCC) [A41.9, R65.20] 09/23/2021 Yes        Brief Hospital Course to date:  Servando Kapadia is a 61 y.o. male with history of stroke with left-sided hemiparesis, type 2 diabetes, A. fib, seizure disorder who was sent in from his nursing home for shortness of breath.  He was admitted with septic shock due to MRSA bacteremia and pneumonia.     Discussion/plan:  PEG tube is in place.  Plan for nutrition for tube feedings.  Patient had restraints removed on 10/5.  Continue off restraints.  Patient given IV iron for iron deficiency anemia.  B12 and folic acid were checked and are adequate.   Plan is for 4 weeks IV treatment of vancomycin.  Repeat laboratory studies tomorrow.  Consult case management to see if we can arrange for transportation back to facility.  Plan to convert seizure medicine to per tube formulation and stop IV fosphenytoin.    Dysphagia  -Received Dobbhoff tube feeding.  Speech therapy recommends remain n.p.o.  -Family want a PEG tube and PEG tube was placed on 10/5/2021  -GI consulted for placement, noted Eliquis held for procedure     Septic shock due to MRSA bacteremia pneumonia  -On IV Vanc, plan for 4 weeks total from negative blood culture (end 10/21)  -TTE complete but not the most diagnostic as was technically difficult study.       Seizure disorder on fosphenytoin   -Neurology was concerned Keppra contributed to sedation.  Keppra was discontinued and Dilantin increased.     ARF/hyponatremia/acidosis    -Resolved     Elevated troponin secondary to renal disease/probable type II MI  -No global dyskinesia-hypokinesis with EF 65% on 9/21/2021 echo     Type 2 diabetes  -Issues with hypoglycemia earlier in admission have resolved  -Continue sliding scale     Past history of CVA with resulting immobility on Eliquis .     Metabolic encephalopathy  -Mental status is slowly improving.  He remains very dysarthric, but follows commands, answers yes or no questions.  Stable and improved.     HTN off meds with improving hypotension on midodrine  -Home blood pressure medicines held on admission due to shock.  Currently requiring midodrine 5 mg 3 times daily.  Wean as tolerated        · DVT prophylaxis , Eliquis held in anticipation of procedure.    SCDs for now  · DNR.  · Discussed with patient and nursing  · Anticipate discharge to SNU facility timing yet to be determined.         CODE STATUS:   Code Status and Medical Interventions:   Ordered at: 09/19/21 2134     Limited Support to NOT Include:    Antiarrhythmic Drugs    Cardioversion/Defibrillation    Dialysis    Intubation    NIPPV (Non-Invasive Positive Pressure Support)    Vasopressors     Level Of Support Discussed With:    Next of Kin (If No Surrogate)     Code Status:    No CPR     Medical Interventions (Level of Support Prior to Arrest):    Limited       Jacoby Triana MD  10/06/21

## 2021-10-07 VITALS
SYSTOLIC BLOOD PRESSURE: 110 MMHG | DIASTOLIC BLOOD PRESSURE: 88 MMHG | WEIGHT: 141.76 LBS | TEMPERATURE: 97.5 F | HEART RATE: 74 BPM | RESPIRATION RATE: 18 BRPM | OXYGEN SATURATION: 99 % | BODY MASS INDEX: 24.2 KG/M2 | HEIGHT: 64 IN

## 2021-10-07 PROBLEM — E87.0 HYPERNATREMIA: Status: RESOLVED | Noted: 2021-09-19 | Resolved: 2021-10-07

## 2021-10-07 PROBLEM — E23.2 DIABETES INSIPIDUS (HCC): Status: ACTIVE | Noted: 2021-10-07

## 2021-10-07 LAB
CREAT SERPL-MCNC: 0.56 MG/DL (ref 0.76–1.27)
GFR SERPL CREATININE-BSD FRML MDRD: 148 ML/MIN/1.73
GFR SERPL CREATININE-BSD FRML MDRD: >150 ML/MIN/1.73
GLUCOSE BLDC GLUCOMTR-MCNC: 117 MG/DL (ref 70–130)
GLUCOSE BLDC GLUCOMTR-MCNC: 130 MG/DL (ref 70–130)
VANCOMYCIN TROUGH SERPL-MCNC: 21.3 MCG/ML (ref 5–20)

## 2021-10-07 PROCEDURE — 82565 ASSAY OF CREATININE: CPT | Performed by: INTERNAL MEDICINE

## 2021-10-07 PROCEDURE — 82962 GLUCOSE BLOOD TEST: CPT

## 2021-10-07 PROCEDURE — 80202 ASSAY OF VANCOMYCIN: CPT | Performed by: INTERNAL MEDICINE

## 2021-10-07 RX ORDER — ACETAMINOPHEN 325 MG/1
650 TABLET ORAL EVERY 6 HOURS PRN
Start: 2021-10-07

## 2021-10-07 RX ORDER — ONDANSETRON 4 MG/1
4 TABLET, FILM COATED ORAL EVERY 8 HOURS PRN
Start: 2021-10-07

## 2021-10-07 RX ORDER — PHENYTOIN 125 MG/5ML
100 SUSPENSION ORAL EVERY 12 HOURS SCHEDULED
Start: 2021-10-07

## 2021-10-07 RX ORDER — DIPHENOXYLATE HYDROCHLORIDE AND ATROPINE SULFATE 2.5; .025 MG/1; MG/1
1 TABLET ORAL DAILY
Qty: 30 TABLET
Start: 2021-10-07

## 2021-10-07 RX ORDER — ATORVASTATIN CALCIUM 40 MG/1
40 TABLET, FILM COATED ORAL NIGHTLY
Start: 2021-10-07

## 2021-10-07 RX ORDER — SACCHAROMYCES BOULARDII 250 MG
250 CAPSULE ORAL 2 TIMES DAILY
Start: 2021-10-07

## 2021-10-07 RX ORDER — BACLOFEN 10 MG/1
5 TABLET ORAL 2 TIMES DAILY
Start: 2021-10-07

## 2021-10-07 RX ORDER — VANCOMYCIN HYDROCHLORIDE 1 G/200ML
1000 INJECTION, SOLUTION INTRAVENOUS EVERY 24 HOURS
Qty: 3000 ML | Refills: 0
Start: 2021-10-07 | End: 2021-10-23 | Stop reason: HOSPADM

## 2021-10-07 RX ORDER — LANSOPRAZOLE
30 KIT ONCE
Status: DISCONTINUED | OUTPATIENT
Start: 2021-10-07 | End: 2021-10-07 | Stop reason: HOSPADM

## 2021-10-07 RX ORDER — VANCOMYCIN HYDROCHLORIDE 1 G/200ML
1000 INJECTION, SOLUTION INTRAVENOUS EVERY 24 HOURS
Status: DISCONTINUED | OUTPATIENT
Start: 2021-10-07 | End: 2021-10-07 | Stop reason: HOSPADM

## 2021-10-07 RX ORDER — MELATONIN
1000 DAILY
Start: 2021-10-07

## 2021-10-07 RX ORDER — SODIUM BICARBONATE 650 MG/1
1300 TABLET ORAL 3 TIMES DAILY
Start: 2021-10-07

## 2021-10-07 RX ORDER — LACTULOSE 10 G/15ML
20 SOLUTION ORAL 2 TIMES DAILY PRN
Start: 2021-10-07

## 2021-10-07 RX ORDER — LEVETIRACETAM 100 MG/ML
500 SOLUTION ORAL EVERY 12 HOURS SCHEDULED
Start: 2021-10-07

## 2021-10-07 RX ORDER — MIRTAZAPINE 15 MG/1
15 TABLET, FILM COATED ORAL NIGHTLY
Start: 2021-10-07

## 2021-10-07 RX ORDER — FUROSEMIDE 20 MG/1
10 TABLET ORAL DAILY
Start: 2021-10-07

## 2021-10-07 RX ORDER — LANOLIN ALCOHOL/MO/W.PET/CERES
1000 CREAM (GRAM) TOPICAL DAILY
Start: 2021-10-07

## 2021-10-07 RX ORDER — MIDODRINE HYDROCHLORIDE 5 MG/1
5 TABLET ORAL
Start: 2021-10-07

## 2021-10-07 RX ADMIN — FUROSEMIDE 10 MG: 20 TABLET ORAL at 09:54

## 2021-10-07 RX ADMIN — Medication 1000 UNITS: at 09:55

## 2021-10-07 RX ADMIN — LEVETIRACETAM 500 MG: 100 SOLUTION ORAL at 09:54

## 2021-10-07 RX ADMIN — SODIUM BICARBONATE 1300 MG: 650 TABLET ORAL at 09:54

## 2021-10-07 RX ADMIN — BACLOFEN 5 MG: 10 TABLET ORAL at 09:54

## 2021-10-07 RX ADMIN — SODIUM CHLORIDE, PRESERVATIVE FREE 10 ML: 5 INJECTION INTRAVENOUS at 09:55

## 2021-10-07 RX ADMIN — Medication 1000 MCG: at 09:54

## 2021-10-07 RX ADMIN — PHENYTOIN 100 MG: 125 SUSPENSION ORAL at 09:54

## 2021-10-07 RX ADMIN — MIDODRINE HYDROCHLORIDE 5 MG: 5 TABLET ORAL at 12:14

## 2021-10-07 RX ADMIN — METOPROLOL TARTRATE 12.5 MG: 25 TABLET, FILM COATED ORAL at 09:54

## 2021-10-07 RX ADMIN — MIDODRINE HYDROCHLORIDE 5 MG: 5 TABLET ORAL at 09:55

## 2021-10-07 RX ADMIN — MAGNESIUM OXIDE 400 MG (241.3 MG MAGNESIUM) TABLET 400 MG: TABLET at 09:55

## 2021-10-07 RX ADMIN — Medication 1 TABLET: at 09:55

## 2021-10-07 NOTE — PLAN OF CARE
Goal Outcome Evaluation:  Plan of Care Reviewed With: patient        Progress: declining  Outcome Summary: Limited cognition. VSS. No evidence of pain. Bedbound and total care. TF continued per MD orders..

## 2021-10-07 NOTE — DISCHARGE SUMMARY
[facility] Medicine Services  DISCHARGE SUMMARY    Patient Name: Servando Kapadia  : 1960  MRN: [id number]    Date of Admission: 2021  4:23 PM  Date of Discharge: 10/7/2021    Consults     Date and Time Order Name Status Description    2021  4:12 PM Inpatient Gastroenterology Consult Completed     2021 12:33 AM Inpatient Infectious Diseases Consult Completed     2021 12:50 PM Inpatient Neurology Consult General Completed     2021 12:59 PM Inpatient Nephrology Consult Completed     2021  3:40 AM Inpatient Palliative Care MD Consult Completed     2021  9:30 PM [facility] (on-call MD unless specified) Details            Hospital Course     Presenting Problem:   Dehydration [E86.0]  Hyperkalemia [E87.5]  Hypernatremia [E87.0]  Hypoxia [R09.02]  Do not resuscitate [Z66]  Hypotension, unspecified hypotension type [I95.9]  Acute renal failure, unspecified acute renal failure type (HCC) [N17.9]  Pneumonia of both lungs due to infectious organism, unspecified part of lung [J18.9]  Sepsis, due to unspecified organism, unspecified whether acute organ dysfunction present (HCC) [A41.9]    Active Hospital Problems    Diagnosis  POA   • **MRSA pneumonia ([facility]) [J15.212]  Yes   • Atrial fibrillation (HCC) [I48.91]  Yes   • MRSA bacteremia [R78.81, B95.62]  Yes   • Type 2 diabetes mellitus ([facility]) [E11.9]  Yes   • HCAP (healthcare-associated pneumonia) [J18.9]  Yes   • Seizure disorder ([facility]) [G40.909]  Yes   • Elevated troponin [R77.8]  Yes   • Acute on chronic diastolic heart failure (HCC) [I50.33]  Yes   • Chronic anticoagulation [Z79.01]  Not Applicable   • Oropharyngeal dysphagia [R13.12]  Yes   • Metabolic encephalopathy [G93.41]  Yes      Resolved Hospital Problems    Diagnosis Date Resolved POA   • Hypercalcemia [E83.52] 2021 Yes   • Hypernatremia [E87.0] 10/07/2021 Yes   • Hyperkalemia [E87.5] 2021 Yes   • TI (acute kidney injury) (HCC) [N17.9] 2021 Yes   • High anion  gap metabolic acidosis [E87.2] 09/23/2021 Yes   • Sepsis with acute organ dysfunction (HCC) [A41.9, R65.20] 09/23/2021 Yes          Hospital Course:  Servando Kapadia is a 61 y.o. male with history of stroke with left-sided hemiparesis, type 2 diabetes, A. fib, seizure disorder who was sent in from his nursing home for shortness of breath.  He was admitted with septic shock due to MRSA bacteremia and pneumonia.     Discussion/plan:  PEG tube is in place.    Tolerating tube feeding.  Discussed with gastroenterology who has cleared for discharge.  Plan is for 4 weeks IV treatment of vancomycin.    Recommend laboratory monitoring at skilled facility with weekly vancomycin trough, CBC, and BMP.    Dysphagia  -Received Dobbhoff tube feeding dysphagia persisted and ultimately received PEG tube..  All medications and nutrition need to go through tube.  Speech therapy recommends remain n.p.o.  -Family wanted a PEG tube and PEG tube was placed on 10/5/2021     Septic shock due to MRSA bacteremia pneumonia  -On IV Vanc, plan for 4 weeks total from negative blood culture (end 10/21)  -TTE complete but not the most diagnostic as was technically difficult study.       Seizure disorder on fosphenytoin   -Neurology followed.  They reevaluated and adjusted seizure medications as per below.  Please continue these medications per PEG at discharge.     ARF/hyponatremia/acidosis   -Resolved     Elevated troponin secondary to renal disease/probable type II MI  -No global dyskinesia-hypokinesis with EF 65% on 9/21/2021 echo.  Good cardiac function.  Elevated troponin was felt to be due to supply demand mismatch and was not severely elevated.  Patient doing well from a cardiac standpoint at this point.  Most recent LDL was at goal of less than 70 I do not feel patient would benefit from statin therapy at discharge.  Continue Eliquis.     Type 2 diabetes  -Issues with hypoglycemia earlier in admission have resolved  -Currently on diabetic  tube feeding.  Patient is glucose controlled without requiring additional medication on current diabetic tube feeding.  Recommend to monitor glucose intermittently and if develops hyperglycemia could add additional diabetic medication.     Past history of CVA with resulting immobility on Eliquis .     Metabolic encephalopathy  -Mental status is slowly improving.  He remains very dysarthric, but follows commands, answers yes or no questions.  Stable and improved.  Exact baseline unknown.  I do not have a clear comparison currently to know patient's previous mental status however he seems to be stable at current baseline.     HTN off meds with improving hypotension on midodrine  -Home blood pressure medicines held on admission due to shock.  Currently requiring midodrine 5 mg 3 times daily.  Continue midodrine at discharge.    Patient is now felt to have achieved maximum benefit from acute care hospitalization.  He appears stable to return to facility.  Recommend he follow-up with a primary care provider or facility provider within 1 week of hospital discharge.  Recommend careful monitoring of laboratory studies including vancomycin level while he is on IV antibiotics.  Please call infectious disease consultant if there are issues with vancomycin dosing or questions.    At the time of discharge patient and discharge recommendations include to take all medications as prescribed, keep all follow-up appointments, and call their doctor or return to the hospital with any worsening or concerning symptoms.    Please note that this note was made using Dragon voice recognition software        Day of Discharge     HPI:   Patient resting comfortable.  Currently calm.  PEG tube in place.    ROS:  Limited due to mental status.    Vital Signs:   Temp:  [96.1 °F (35.6 °C)-97.6 °F (36.4 °C)] 96.9 °F (36.1 °C)  Heart Rate:  [71-82] 72  Resp:  [16-18] 18  BP: ()/(73-94) 96/78     Physical Exam:  Constitutional:Awake, alert,  chronically ill-appearing  HENT: NCAT, mucous membranes moist, neck supple  Respiratory: Occasional coarse breath sounds, normal pulmonary effort and no respiratory distress  Cardiovascular: RRR, normal radial pulses  Gastrointestinal: PEG tube in place, positive bowel sounds, soft, nontender, nondistended  Musculoskeletal: Normal musculature for age, no lower extremity edema, BMI 25  Psychiatric: Calm affect, not conversational but occasional verbalization  Neurologic: Speaks occasional words, difficult to assess orientation, can follow some simple commands  Skin: No rashes or jaundice, warm    Pertinent  and/or Most Recent Results     Results from last 7 days   Lab Units 10/07/21  1141 10/05/21  0626 10/04/21  0635 10/03/21  0618 10/02/21  0647 10/02/21  0646   WBC 10*3/mm3  --  7.91 10.32 8.62  --  8.88   HEMOGLOBIN g/dL  --  7.6* 8.5* 7.6*  --  7.3*   HEMATOCRIT %  --  23.6* 26.4* 23.4*  --  22.4*   PLATELETS 10*3/mm3  --  324 324 268  --  236   SODIUM mmol/L  --  139 141 140 139  --    POTASSIUM mmol/L  --  4.3 4.6 4.1 3.9  --    CHLORIDE mmol/L  --  105 105 106 105  --    CO2 mmol/L  --  30.5* 30.9* 27.8 28.7  --    BUN mg/dL  --  18 19 17 17  --    CREATININE mg/dL 0.56* 0.35* 0.43* 0.39* 0.37*  --    GLUCOSE mg/dL  --  69 74 86 124*  --    CALCIUM mg/dL  --  9.0 9.3 8.6 8.4*  --      Results from last 7 days   Lab Units 10/04/21  0635 10/02/21  0647   BILIRUBIN mg/dL  --  <0.2   ALK PHOS U/L  --  70   ALT (SGPT) U/L  --  12   AST (SGOT) U/L  --  13   PROTIME Seconds 12.4  --    INR  0.94  --        Microbiology Results Abnormal     Procedure Component Value - Date/Time    COVID PRE-OP / PRE-PROCEDURE SCREENING ORDER (NO ISOLATION) - Swab, Nasopharynx [[id number]]  (Normal) Collected: 10/06/21 6152    Lab Status: Final result Specimen: Swab from Nasopharynx Updated: 10/06/21 6177    Narrative:      The following orders were created for panel order COVID PRE-OP / PRE-PROCEDURE SCREENING ORDER (NO ISOLATION) -  Swab, Nasopharynx.  Procedure                               Abnormality         Status                     ---------                               -----------         ------                     COVID-19,APTIMA PANTHER(...[[id number]]  Normal              Final result                 Please view results for these tests on the individual orders.    COVID-19,APTIMA PANTHER([facility]ARMEN[facility]),[facility], NP/OP SWAB IN UTM/VTM/SALINE TRANSPORT MEDIA,24 HR TAT - Swab, Nasopharynx [[id number]]  (Normal) Collected: 10/06/21 1733    Lab Status: Final result Specimen: Swab from Nasopharynx Updated: 10/06/21 2353     COVID19 Not Detected    Narrative:      Fact sheet for providers: https://www.fda.gov/media/048176/download     Fact sheet for patients: https://www.fda.gov/media/031542/download    Test performed by RT PCR.    COVID PRE-OP / PRE-PROCEDURE SCREENING ORDER (NO ISOLATION) - Swab, Nasopharynx [[id number]]  (Normal) Collected: 10/03/21 0824    Lab Status: Final result Specimen: Swab from Nasopharynx Updated: 10/03/21 0955    Narrative:      The following orders were created for panel order COVID PRE-OP / PRE-PROCEDURE SCREENING ORDER (NO ISOLATION) - Swab, Nasopharynx.  Procedure                               Abnormality         Status                     ---------                               -----------         ------                     COVID-19,[facility] IN-HOUSE...[[id number]]  Normal              Final result                 Please view results for these tests on the individual orders.    COVID-19,[facility] IN-HOUSE CEPHEID/SANDRA NP SWAB IN TRANSPORT MEDIA 8-12 HR TAT - Swab, Nasopharynx [[id number]]  (Normal) Collected: 10/03/21 0824    Lab Status: Final result Specimen: Swab from Nasopharynx Updated: 10/03/21 0955     COVID19 Not Detected    Narrative:      Fact sheet for providers: https://www.fda.gov/media/209316/download    Fact sheet for patients: https://www.fda.gov/media/400823/download    Test performed by PCR.    Blood Culture -  Blood, Arm, Left [[id number]] Collected: 09/23/21 1322    Lab Status: Final result Specimen: Blood from Arm, Left Updated: 09/28/21 1346     Blood Culture No growth at 5 days    Blood Culture - Blood, Blood, PICC Line [[id number]] Collected: 09/23/21 0240    Lab Status: Final result Specimen: Blood, PICC Line Updated: 09/28/21 0301     Blood Culture No growth at 5 days    Blood Culture - Blood, Arm, Right [[id number]] Collected: 09/19/21 1640    Lab Status: Final result Specimen: Blood from Arm, Right Updated: 09/24/21 1700     Blood Culture No growth at 5 days    Respiratory Panel PCR w/COVID-19(SARS-CoV-2) [facility]/GARETH/SHARIF/PAD/[facility]/BRENNEN In-House, NP Swab in UTM/VTM, 3-4 HR TAT - Swab, Nasopharynx [[id number]]  (Normal) Collected: 09/19/21 1648    Lab Status: Final result Specimen: Swab from Nasopharynx Updated: 09/19/21 1753     ADENOVIRUS, PCR Not Detected     Coronavirus 229E Not Detected     Coronavirus HKU1 Not Detected     Coronavirus NL63 Not Detected     Coronavirus OC43 Not Detected     COVID19 Not Detected     Human Metapneumovirus Not Detected     Human Rhinovirus/Enterovirus Not Detected     Influenza A PCR Not Detected     Influenza B PCR Not Detected     Parainfluenza Virus 1 Not Detected     Parainfluenza Virus 2 Not Detected     Parainfluenza Virus 3 Not Detected     Parainfluenza Virus 4 Not Detected     RSV, PCR Not Detected     Bordetella pertussis pcr Not Detected     Bordetella parapertussis PCR Not Detected     Chlamydophila pneumoniae PCR Not Detected     Mycoplasma pneumo by PCR Not Detected    Narrative:      In the setting of a positive respiratory panel with a viral infection PLUS a negative procalcitonin without other underlying concern for bacterial infection, consider observing off antibiotics or discontinuation of antibiotics and continue supportive care. If the respiratory panel is positive for atypical bacterial infection (Bordetella pertussis, Chlamydophila pneumoniae, or Mycoplasma  pneumoniae), consider antibiotic de-escalation to target atypical bacterial infection.          Imaging Results (All)     Procedure Component Value Units Date/Time    IR reposition central line w fluoro [[id number]] Collected: 10/01/21 1642     Updated: 10/01/21 1647    Narrative:      PICC LINE REPOSITION     INDICATION: Malpositioned right PICC line     Total fluoroscopy time 9 seconds. 2 fluoroscopic images obtained.     TECHNIQUE:  Maximum sterile barrier technique was used including sterile cap, mask,  gloves, gown and large sterile sheet as well as pre-procedure hand  washing and cutaneous antisepsis with 2 % chlorhexidine.      image obtained showing the existing right upper extremity PICC  line with tip in the right internal jugular vein. A wire was advanced  through the PICC line under fluoroscopic guidance. The PICC line was  then retracted and advanced back into the lower SVC over the wire. There  was good blood return from the PICC line and it flushed easily upon  completion. Sterile dressing was applied. No immediate complications.       Impression:      Successful right-sided PICC line repositioning over a guidewire     This report was finalized on 10/1/2021 4:44 PM by Dr. Khalif Leonard M.D.       XR Chest Post CVA Port [[id number]] Collected: 09/30/21 1750     Updated: 09/30/21 1803    Narrative:      CHEST SINGLE VIEW     HISTORY: Check PICC placement     COMPARISON: AP chest 09/19/2021     FINDINGS: Right-sided PICC has been placed and the tip extends  superiorly into the right IJ vein and right neck and off the field of  view. This needs to be repositioned.     A feeding tube is present. There is hazy basilar opacity consistent with  a combination of pleural fluid and atelectasis or infiltrates. There is  no pneumothorax. There is gaseous distention of bowel loops in the upper  abdomen. Cardiac monitoring leads are noted.       Impression:      1. Right-sided PICC has been placed and the tip  extends into the right  IJ vein/neck and off the field superiorly and this needs to be  repositioned.  2. Low lung volumes with small bilateral pleural effusions associated  with hazy basilar atelectasis or infiltrates.  3. Gaseous distention of bowel loops in the upper abdomen.  4. Feeding tube is present.     This report was finalized on 9/30/2021 6:00 PM by Dr. Richard Pfeiffer M.D.       FL Video Swallow With Speech Single Contrast [[id number]] Collected: 09/28/21 1431     Updated: 09/28/21 1435    Narrative:      VIDEO SWALLOWING EXAMINATION BY SPEECH PATHOLOGY     Clinical: Dysphasia     Video swallowing examination performed under the direction of speech  pathology. Imaging reviewed by radiologist who concurs with the  findings.     Speech pathology summary:Fed by SLP for all consistencies secondary to  soft UE restraints present, trials included - HTL by tsp/straw, NTL by  tsp/straw, thins by straw, puree, soft solids. Mastication is  inefficient, incomplete bolus recollection. Lingual movements are  decreased for propulsion, brisk. Bolus control is adequate. Base of  tongue retraction weak with no entry into the nasopharynx. Anterior  movement of hyoid complex mildly decreased, functional. Laryngeal  elevation, epiglottic inversion adequate, achieve laryngeal vestibular  closure in most opportunities. Premature spillage to the valleculae for  HTL by straw, NTL by tsp, NTL by straw; pyriform for thin liquids by  straw. Intermittently two swallows are required to clear the bolus (HTL  by straw) - appearing most closely correlated to decreased and slow  lingual movements. Minimal BOT and valleculae residuals remain with  puree. Residual decreased to trace with liquid wash. Minimal/moderate  oral residuals remain with soft solids secondary to ineffective oral  phase. Aspiration before the swallow down posterior tracheal wall  secondary to premature spillage with bolus head in pyriform, delayed  airway closure  for thin liquids by straw.. Additional deep penetration  to the vocal folds, trace aspiration during the swallow x1, after the  swallow x1 noted with small sips thins by straw. No spontaneous airway  response appreciated. A cued cough can eject majority, not all of  material. No additional incidents of penetration or aspiration were  noted throughout.     FLUOROSCOPY TIME: 2 minutes 54 seconds, 4852 images.     This report was finalized on 9/28/2021 2:32 PM by Dr. Matt Guevara M.D.       CT Head Without Contrast [[id number]] Collected: 09/25/21 2145     Updated: 09/25/21 2145    Narrative:        Patient: KIP RODRIGUEZ  Time Out: 21:44  Exam(s): CT HEAD Without Contrast     EXAM:    CT Head Without Intravenous Contrast    CLINICAL HISTORY:     Reason for exam: Mental status change, unknown cause. lethargy, Hx of   CVA in past.    TECHNIQUE:    Axial computed tomography images of the head brain without intravenous   contrast.  CTDI is 54.80 mGy and DLP is 974.40 mGy-cm.  This CT exam was   performed according to the principle of ALARA (As Low As Reasonably   Achievable) by using one or more of the following dose reduction   techniques: automated exposure control, adjustment of the mA and or kV   according to patient size, and or use of iterative reconstruction   technique.    COMPARISON:    1 7 2017.    FINDINGS:    Brain:  Small vessel disease of aging.  Chronic lacunar infarct right   thalamic area.  No abnormal extra-axial collection is noted.  No   hemorrhage.    Midline shift:  Midline anatomy is unremarkable.    Ventricles:  There is prominence of the ventricular system, cortical   sulci, basilar cisterns, compatible with age related atrophy.    Bones joints:  Unremarkable.  No acute fracture.    Soft tissues:  There is suggestion of a possible 4.5 x 1.2 cm scalp   hematoma overlying the occipital region of the midline.  Clinical   correlation is advised.  Similar finding is noted on the previous study.     Sinuses:  Unremarkable as visualized.  No acute sinusitis.    Mastoid air cells:  Unremarkable as visualized.  No mastoid effusion.    IMPRESSION:       1.  Possible scalp hematoma at the occipital region of the midline   similar to that noted on the previous study.  2.  Age-related atrophy and small vessel disease of aging.  3.  Chronic lacunar infarct right thalamic area.  4.  No acute intracranial pathology is detected.  5.  If there is concern for etiology such as early acute lacunar infarct,   MR imaging of the brain with diffusion-weighted sequences should be   performed.  6.  Clinical correlation is advised to assess for etiology such as normal   pressure hydrocephalus.      Impression:          Electronically signed by Josr Mixon MD on 09-25-21 at 2144    IR reposition central line w ana [[id number]] Collected: 09/23/21 1525     Updated: 09/23/21 1531    Narrative:      PERIPHERALLY INSERTED CENTRAL CATHETER REPOSITIONING     HISTORY:  PICC line needs repositioned; A41.9-Sepsis, unspecified  organism; E86.0-Dehydration; N17.9-Acute kidney failure, unspecified;      PROCEDURE/FINDINGS: After the risks and benefits of the procedure were  explained to the patient, informed consent was obtained. Patient was  placed on the angiographytable in the supine position. The right arm and  indwelling PICC line were cleaned, prepped and draped in usual sterile  manner. Maximum sterile barrier technique was used including sterile  cap, mask, gloves, gown, and large sterile sheet as well as preprocedure  handwashing and cutaneous antisepsis with 2% chlorhexidine. With the  help of a guidewire the indwelling PICC line was repositioned so that  its tip terminated in the upper right atrium. After wire removal, port  aspiration and flushing the catheter was secured in place. The patient  tolerated the procedure well, and there were no immediate complications.  All elements of maximal sterile technique were followed. 0.7  minutes  fluoroscopy time, 6 mGy, 1 exposure.       Impression:         Successful repositioning of right upper extremity PICC.     This report was finalized on 9/23/2021 3:28 PM by Dr. David Bello M.D.       XR Abdomen KUB [[id number]] Collected: 09/23/21 1319     Updated: 09/23/21 1325    Narrative:      XR ABDOMEN KUB-     INDICATIONS: NG tube placement     TECHNIQUE: Frontal view of the abdomen     COMPARISON:  image from CT from 09/19/2021     FINDINGS:     NG tube extends to the distal stomach. The bowel gas pattern is  nonobstructive. No supine evidence for free intraperitoneal gas.  Moderate colonic fecal retention is apparent. Follow-up as clinically  indicated.             Impression:         As described.     This report was finalized on 9/23/2021 1:22 PM by Dr. Rolf Naik M.D.       XR Chest Post CVA Port [[id number]] Collected: 09/21/21 1613     Updated: 09/21/21 1621    Narrative:      XR CHEST POST CVA PORT-     INDICATIONS: PICC placement     TECHNIQUE: Frontal view of the chest     COMPARISON: 09/19/2021     FINDINGS:     Right PICC extends to the proximal right subclavian region, then loops  back down the right upper arm, then loops once more, distal tip in the  right axillary region. Repositioning of the catheter is needed. The  heart size is normal. Pulmonary vasculature is unremarkable. Small  atelectasis/infiltrate at the bases, left more than right. No  pneumothorax or large pleural effusion. No acute osseous process.       Impression:         As described.     Discussed by telephone with patient's nurse, Seven, at time of  interpretation, 1612, 09/21/2021.     This report was finalized on 9/21/2021 4:18 PM by Dr. Rolf Naik M.D.       CT Chest Without Contrast Diagnostic [[id number]] Collected: 09/19/21 2034     Updated: 09/19/21 2054    Narrative:      CT CHEST WITHOUT CONTRAST; CT OF THE ABDOMEN AND PELVIS WITHOUT CONTRAST     HISTORY: Shortness of breath and lower  abdominal pain     COMPARISON: 01/08/2017     TECHNIQUE: Axial CT imaging was obtained through the abdomen and pelvis.  No IV contrast was administered.     FINDINGS:  CT CHEST: Exam is degraded by motion artifact. Patchy areas of  consolidation are seen within both lungs. Appearance is suspicious for  multifocal pneumonia. The thyroid gland is within normal limits. Patient  is noted to have some secretions layering dependently within the  trachea. There are extensive coronary artery calcifications. Esophagus  is within normal limits. Shotty mediastinal lymph nodes are noted.  Thoracic aorta measures within normal size limits. There is no pleural  or pericardial effusion. Bilateral gynecomastia is noted. Patient  apparently has a baclofen pump. There is some body wall edema.     CT OF THE ABDOMEN AND PELVIS: Examination is severely degraded by motion  artifact, patient positioning, and lack of contrast material. No focal  hepatic lesions are seen. Gallbladder appears distended, although no  obvious stones or sludge are seen. The stomach and duodenum are  unremarkable, as are the adrenal glands. Pancreas is atrophic. Spleen is  within normal limits. There are bilateral renal stones. There is no  hydronephrosis. No distal ureteral or bladder stones are seen. Prostate  gland is probably enlarged. There is no evidence of mechanical small  bowel obstruction. However, the patient is noted to have a large amount  of stool within the rectosigmoid, concerning for fecal impaction. There  is associated wall thickening, which may reflect proctocolitis. Sigmoid  colon is very redundant, and demonstrates gaseous distention, although  the proximal colon is relatively decompressed. There is a collection  which is identified within the left anterior abdominal wall, measuring  6.2 x 3.1 cm. Bone marrow is heterogeneous, which may be related to  osteoporosis. However bone scan would be more sensitive for assessment.  Cm. Patient  originally had a baclofen pump within this area. However,  the generator appears to have been removed. Significance of this  collection is not certain. It may represent hematoma following removal.  Correlation with history is suggested.       Impression:         1. Bilateral areas of consolidation within both lungs, suspicious for  pneumonia.  2. Large volume of stool within the rectosigmoid, concerning for fecal  impaction, with associated proctocolitis. There is some gaseous  distention of the more proximal sigmoid colon, although this was present  on prior study.  3. There is a collection identified within the left anterior abdominal  wall. This was the site of the patient's prior baclofen pump generator.  The generator is no longer seen. Hematoma following removal would be a  consideration, although correlation with clinical presentation is  recommended.     Radiation dose reduction techniques were utilized, including automated  exposure control and exposure modulation based on body size.     This report was finalized on 9/19/2021 8:51 PM by Dr. Marilynn Bermudez M.D.       CT Abdomen Pelvis Without Contrast [[id number]] Collected: 09/19/21 2034     Updated: 09/19/21 2054    Narrative:      CT CHEST WITHOUT CONTRAST; CT OF THE ABDOMEN AND PELVIS WITHOUT CONTRAST     HISTORY: Shortness of breath and lower abdominal pain     COMPARISON: 01/08/2017     TECHNIQUE: Axial CT imaging was obtained through the abdomen and pelvis.  No IV contrast was administered.     FINDINGS:  CT CHEST: Exam is degraded by motion artifact. Patchy areas of  consolidation are seen within both lungs. Appearance is suspicious for  multifocal pneumonia. The thyroid gland is within normal limits. Patient  is noted to have some secretions layering dependently within the  trachea. There are extensive coronary artery calcifications. Esophagus  is within normal limits. Shotty mediastinal lymph nodes are noted.  Thoracic aorta measures within  normal size limits. There is no pleural  or pericardial effusion. Bilateral gynecomastia is noted. Patient  apparently has a baclofen pump. There is some body wall edema.     CT OF THE ABDOMEN AND PELVIS: Examination is severely degraded by motion  artifact, patient positioning, and lack of contrast material. No focal  hepatic lesions are seen. Gallbladder appears distended, although no  obvious stones or sludge are seen. The stomach and duodenum are  unremarkable, as are the adrenal glands. Pancreas is atrophic. Spleen is  within normal limits. There are bilateral renal stones. There is no  hydronephrosis. No distal ureteral or bladder stones are seen. Prostate  gland is probably enlarged. There is no evidence of mechanical small  bowel obstruction. However, the patient is noted to have a large amount  of stool within the rectosigmoid, concerning for fecal impaction. There  is associated wall thickening, which may reflect proctocolitis. Sigmoid  colon is very redundant, and demonstrates gaseous distention, although  the proximal colon is relatively decompressed. There is a collection  which is identified within the left anterior abdominal wall, measuring  6.2 x 3.1 cm. Bone marrow is heterogeneous, which may be related to  osteoporosis. However bone scan would be more sensitive for assessment.  Cm. Patient originally had a baclofen pump within this area. However,  the generator appears to have been removed. Significance of this  collection is not certain. It may represent hematoma following removal.  Correlation with history is suggested.       Impression:         1. Bilateral areas of consolidation within both lungs, suspicious for  pneumonia.  2. Large volume of stool within the rectosigmoid, concerning for fecal  impaction, with associated proctocolitis. There is some gaseous  distention of the more proximal sigmoid colon, although this was present  on prior study.  3. There is a collection identified within the  left anterior abdominal  wall. This was the site of the patient's prior baclofen pump generator.  The generator is no longer seen. Hematoma following removal would be a  consideration, although correlation with clinical presentation is  recommended.     Radiation dose reduction techniques were utilized, including automated  exposure control and exposure modulation based on body size.     This report was finalized on 9/19/2021 8:51 PM by Dr. Marilynn Bermudez M.D.       XR Chest 1 View [[id number]] Collected: 09/19/21 1659     Updated: 09/19/21 1915    Narrative:      EXAMINATION: SINGLE VIEW CHEST RADIOGRAPH     HISTORY: 61-year-old male with a history of shortness of air.     FINDINGS: A semiupright AP chest radiograph was obtained. Comparison is  made to a chest radiograph dated 01/07/2017. The lungs are normal in  volume and are clear. The cardiomediastinal silhouette and pulmonary  vasculature appear normal.       Impression:      Negative chest radiograph.     This report was finalized on 9/19/2021 7:12 PM by Dr. Hill Estrada M.D.                     Results for orders placed during the hospital encounter of 09/19/21    Adult Transthoracic Echo Complete W/ Cont if Necessary Per Protocol    Interpretation Summary  · Estimated left ventricular EF = 65% Left ventricular systolic function is normal.  · The left ventricular cavity is small in size.  · Left ventricular diastolic function was normal.  · Very technically difficult study with limited views available      Discharge Details        Discharge Medications      New Medications      Instructions Start Date   lansoprazole 3 mg/mL in sodium bicarbonate injection 8.4%   15 mg, Per G Tube, Daily      levETIRAcetam 100 MG/ML solution  Commonly known as: KEPPRA  Replaces: levETIRAcetam 500 MG tablet   500 mg, Oral, Every 12 Hours Scheduled      magnesium oxide 400 tablet tablet  Commonly known as: MAG-OX  Replaces: MAGTAB 84 MG (7MEQ) tablet controlled-release  CR tablet   400 mg, Per G Tube, Daily   Start Date: October 8, 2021     midodrine 5 MG tablet  Commonly known as: PROAMATINE   5 mg, Per G Tube, 3 Times Daily Before Meals      PHARMACY TO DOSE VANCOMYCIN   Does not apply, Continuous PRN      phenytoin 100 MG/4ML suspension  Commonly known as: DILANTIN   100 mg, Oral, Every 12 Hours Scheduled      sodium bicarbonate 650 MG tablet   1,300 mg, Per G Tube, 3 Times Daily      vancomycin   1,250 mg, Intravenous, Every 24 Hours, Through 10/21/21         Changes to Medications      Instructions Start Date   acetaminophen 325 MG tablet  Commonly known as: TYLENOL  What changed:   · how to take this  · additional instructions  · Another medication with the same name was removed. Continue taking this medication, and follow the directions you see here.   650 mg, Per G Tube, Every 6 Hours PRN      apixaban 5 MG tablet tablet  Commonly known as: ELIQUIS  What changed:   · how to take this  · when to take this   5 mg, Per G Tube, Every 12 Hours Scheduled      atorvastatin 40 MG tablet  Commonly known as: LIPITOR  What changed: how to take this   40 mg, Per G Tube, Nightly      baclofen 10 MG tablet  Commonly known as: LIORESAL  What changed: how to take this   5 mg, Per G Tube, 2 Times Daily      cholecalciferol 25 MCG (1000 UT) tablet  Commonly known as: VITAMIN D3  What changed:   · how to take this  · when to take this   1,000 Units, Per G Tube, Daily      furosemide 20 MG tablet  Commonly known as: LASIX  What changed: how to take this   10 mg, Per G Tube, Daily      lactulose 10 GM/15ML solution  Commonly known as: CHRONULAC  What changed: how to take this   20 g, Per G Tube, 2 Times Daily PRN      metFORMIN 1000 MG tablet  Commonly known as: GLUCOPHAGE  What changed:   · how much to take  · how to take this   500 mg, Per G Tube, 2 Times Daily With Meals      metoprolol tartrate 25 MG tablet  Commonly known as: LOPRESSOR  What changed:   · medication strength  · how much to  take  · how to take this  · when to take this  · additional instructions   12.5 mg, Per G Tube, Every 12 Hours Scheduled      mirtazapine 15 MG tablet  Commonly known as: REMERON  What changed: how to take this   15 mg, Per G Tube, Nightly      multivitamin tablet tablet  What changed: how to take this   1 tablet, Per G Tube, Daily      ondansetron 4 MG tablet  Commonly known as: ZOFRAN  What changed:   · how to take this  · when to take this   4 mg, Per G Tube, Every 8 Hours PRN      saccharomyces boulardii 250 MG capsule  Commonly known as: FLORASTOR  What changed:   · how to take this  · when to take this   250 mg, Per G Tube, 2 Times Daily      vitamin B-12 1000 MCG tablet  Commonly known as: CYANOCOBALAMIN  What changed: how to take this   1,000 mcg, Per G Tube, Daily         Continue These Medications      Instructions Start Date   bisacodyl 10 MG suppository  Commonly known as: DULCOLAX   10 mg, Rectal, Daily PRN      dextrose 40 % gel  Commonly known as: GLUTOSE   15 g, Oral, As Needed      glucagon 1 MG injection  Commonly known as: GLUCAGEN   1 mg, Intramuscular, Once As Needed      ipratropium-albuterol  MCG/ACT inhaler  Commonly known as: COMBIVENT RESPIMAT   2 puffs, Inhalation, 4 Times Daily - RT      nitroglycerin 0.4 MG SL tablet  Commonly known as: NITROSTAT   0.4 mg, Sublingual, Every 5 Minutes PRN         Stop These Medications    amLODIPine 10 MG tablet  Commonly known as: NORVASC     levETIRAcetam 500 MG tablet  Commonly known as: KEPPRA  Replaced by: levETIRAcetam 100 MG/ML solution     lisinopril 20 MG tablet  Commonly known as: PRINIVIL,ZESTRIL     MAGTAB 84 MG (7MEQ) tablet controlled-release CR tablet  Generic drug: magnesium  Replaced by: magnesium oxide 400 tablet tablet     omeprazole 20 MG capsule  Commonly known as: priLOSEC     phenytoin 100 MG ER capsule  Commonly known as: DILANTIN            No Known Allergies      Discharge Disposition:  Skilled Nursing Facility (DC -  External)    Diet:  Hospital:  Diet Order   Procedures   • NPO Diet       Activity:  Activity Instructions     Up WIth Assist                 CODE STATUS:    Code Status and Medical Interventions:   Ordered at: 09/19/21 2134     Limited Support to NOT Include:    Antiarrhythmic Drugs    Cardioversion/Defibrillation    Dialysis    Intubation    NIPPV (Non-Invasive Positive Pressure Support)    Vasopressors     Level Of Support Discussed With:    Next of Kin (If No Surrogate)     Code Status:    No CPR     Medical Interventions (Level of Support Prior to Arrest):    Limited       Additional Instructions for the Follow-ups that You Need to Schedule     Discharge Follow-up with Specified Provider: Primary care 1 week   As directed      To: Primary care 1 week                     Jacoby Triana MD  10/07/21      Time Spent on Discharge:  I spent greater than 30 minutes on this discharge activity which included: face-to-face encounter with the patient, reviewing the data in the system, coordination of the care with the nursing staff as well as consultants, documentation, and entering orders.

## 2021-10-07 NOTE — DISCHARGE INSTRUCTIONS
Wound Care     Coccyx - hydrofiber covered with mepilex every 3 days and PRN  Lt gluteal - Mepilex every 3 days and PRN  Rt inner knee - Mepilex every 3 days and PRN  Lt heel - Mepilex every 3 days and PRN  Rt outer foot - mepilex every 3 days and PRN

## 2021-10-07 NOTE — PROGRESS NOTES
"[facility] Clinical Pharmacy Services: Outpatient Vancomycin Recommentations    Patient Name: Servando Kapadia  Indication: MRSA septicemia secondary to PNA  Target AUC24 level: 400-600  Consulting Provider: Dr. Cornejo (Dr. Bose consulted for ID)  Allergies:   Allergies as of 09/19/2021   • (No Known Allergies)     Duration of Therapy: 4 weeks per ID (stop date 10/21/21)    Other Antimicrobials: None    Current Vancomycin Dose: 1250 mg (~19.5 mg/kg) IV every 24 hours     Relevant clinical data and objective history reviewed:  61 y.o. male 162.6 cm (64.02\") 64.3 kg (141 lb 12.1 oz)    Vitals:    10/07/21 0546 10/07/21 0743 10/07/21 0944 10/07/21 1212   BP:  98/73 106/79 96/78   BP Location:  Left arm Left arm Left arm   Pulse:   82 72   Resp:  18     Temp:  96.9 °F (36.1 °C)     TempSrc:  Oral     SpO2:  99%       Creatinine   Date Value Ref Range Status   10/07/2021 0.56 (L) 0.76 - 1.27 mg/dL Final   10/05/2021 0.35 (L) 0.76 - 1.27 mg/dL Final     BUN   Date Value Ref Range Status   10/05/2021 18 8 - 23 mg/dL Final     Estimated Creatinine Clearance: 126 mL/min (A) (by C-G formula based on SCr of 0.56 mg/dL (L)).    Lab Results   Component Value Date    WBC 7.91 10/05/2021     Temp Readings from Last 3 Encounters:   10/07/21 96.9 °F (36.1 °C) (Oral)     Baseline culture/source/susceptibility:  9/19: BCx-MRSA (1/2 bottles)  9/19: Resp panel-negative  9/20: MRSA screen-positive  9/23: BCx-negative  10/3 and 10/6: COVID-19-negative    Recommendations/Plan:  · Vancomycin trough came back slightly high at 21.3 mcg/mL with the 1250 mg q24h regimen. Will decrease dose to 1000 mg IV q24h, give a dose today, and then change on the med discharge.   · Would recommend getting a trough in 3-4 days to check to see that number is between 15-20 mcg/mL.  · Monitor serum creatinine at least every 48 hours per dosing recommendations-SCr is stable at 0.56.  · Pharmacy will continue to follow daily while on vancomycin and adjust " as needed.     Thank you for allowing me to participate in your patient's care. Please call with any concerns or questions.   Lilly Santiago, Pharm.D., [facility]   Clinical Staff Pharmacist

## 2021-10-07 NOTE — PROGRESS NOTES
Continued Stay Note  [facility]     Patient Name: Servando Kapadia  MRN: [id number]  Today's Date: 10/7/2021    Admit Date: 9/19/2021    Discharge Plan     Row Name 10/07/21 1559       Plan    Plan  [facility] skiled rehab via [facility] transport    Plan Comments  [facility] next available truck is 1115 10/8, [place] unavailable, [facility] do not have stretcher vans available today.  Contacted [facility] for transport for patient and they will contact Jose NEWBERRY at [phone] with ETA.  Faxed DC summary and Covid test to facility.  Spoke with Sonia and she accepts today and has a bed.  Jose will notify family....................Tracie Almendarez RN        Discharge Codes    No documentation.       Expected Discharge Date and Time     Expected Discharge Date Expected Discharge Time    Oct 7, 2021             Tracie Almendarez RN

## 2021-10-07 NOTE — PLAN OF CARE
Goal Outcome Evaluation:  Plan of Care Reviewed With: family        Progress: improving  Outcome Summary: plan to discharge back to the facility this afternoon once transportation has been arranged

## 2021-10-07 NOTE — PROGRESS NOTES
"  Infectious Diseases Progress Note    Tricia Bose MD     [facility]  [place]: 18 days  Patient Identification:  Name: Servando Kapadia  Age: 61 y.o.  Sex: male  :  1960  MRN: [id number]         Primary Care Physician: No primary care provider on file.            Subjective:tries to communicate but difficult to understand.  Interval History: See consultation note.  Status post PEG on 10/5/2021  Objective:    Scheduled Meds:atorvastatin, 40 mg, Oral, Nightly  baclofen, 5 mg, Oral, BID  cholecalciferol, 1,000 Units, Oral, Daily  furosemide, 10 mg, Oral, Daily  insulin lispro, 0-9 Units, Subcutaneous, TID AC  levETIRAcetam, 500 mg, Oral, Q12H  magnesium oxide, 400 mg, Oral, Daily  metoprolol tartrate, 12.5 mg, Oral, Q12H  midodrine, 5 mg, Nasogastric, TID AC  mirtazapine, 15 mg, Oral, Nightly  multivitamin, 1 tablet, Oral, Daily  phenytoin, 100 mg, Oral, Q12H  sodium bicarbonate, 1,300 mg, Nasogastric, TID  sodium chloride, 10 mL, Intravenous, Q12H  sodium chloride, 10 mL, Intravenous, Q12H  vancomycin, 1,250 mg, Intravenous, Q24H  vitamin B-12, 1,000 mcg, Oral, Daily      Continuous Infusions:Pharmacy to dose vancomycin,   sodium chloride, 30 mL/hr, Last Rate: 30 mL/hr (10/05/21 0658)        Vital signs in last 24 hours:  Temp:  [96.1 °F (35.6 °C)-97.6 °F (36.4 °C)] 97.6 °F (36.4 °C)  Heart Rate:  [71-82] 75  Resp:  [16-17] 16  BP: (113-126)/(89-94) 113/94    Intake/Output:    Intake/Output Summary (Last 24 hours) at 10/7/2021 0718  Last data filed at 10/7/2021 0615  Gross per 24 hour   Intake 2839 ml   Output 4300 ml   Net -1461 ml       Exam:  /94 (BP Location: Left arm, Patient Position: Lying)   Pulse 75   Temp 97.6 °F (36.4 °C) (Oral)   Resp 16   Ht 162.6 cm (64.02\")   Wt 64.3 kg (141 lb 12.1 oz)   SpO2 100%   BMI 24.32 kg/m²   Patient is examined using the personal protective equipment as per guidelines from infection control for this particular patient as enacted.  Hand washing " was performed before and after patient interaction.  General Appearance: Chronically ill, awake, not answering questions, mumbles  Skin: warm and dry  HEENT: Oral mucosa is dry, nonicteric sclera nasogastric feeding tube is out  Neck: supple, no JVD  Lungs: Lateral rhonchi, unlabored breathing effort  Heart: RRR, normal S1 and S2, no S3, no rub  Abdomen: soft, no guarding, normoactive bowel, left lower/flank area pain pump site appears to be not inflamed or having any drainage.  Pain is in place with binder protecting.  : no palpable bladder, Beyer catheter anchored  Extremities: Significant contractures  Neuro: Does not engage and interact and has sequelae of previous stroke with contractures of extremities.       Data Review:    I reviewed the patient's new clinical results.  Results from last 7 days   Lab Units 10/05/21  0626 10/04/21  0635 10/03/21  0618 10/02/21  0646   WBC 10*3/mm3 7.91 10.32 8.62 8.88   HEMOGLOBIN g/dL 7.6* 8.5* 7.6* 7.3*   PLATELETS 10*3/mm3 324 324 268 236     Results from last 7 days   Lab Units 10/05/21  0626 10/04/21  0635 10/03/21  0618 10/02/21  0647   SODIUM mmol/L 139 141 140 139   POTASSIUM mmol/L 4.3 4.6 4.1 3.9   CHLORIDE mmol/L 105 105 106 105   CO2 mmol/L 30.5* 30.9* 27.8 28.7   BUN mg/dL 18 19 17 17   CREATININE mg/dL 0.35* 0.43* 0.39* 0.37*   CALCIUM mg/dL 9.0 9.3 8.6 8.4*   GLUCOSE mg/dL 69 74 86 124*       Microbiology Results (last 10 days)     Procedure Component Value - Date/Time    COVID PRE-OP / PRE-PROCEDURE SCREENING ORDER (NO ISOLATION) - Swab, Nasopharynx [[id number]]  (Normal) Collected: 10/06/21 7503    Lab Status: Final result Specimen: Swab from Nasopharynx Updated: 10/06/21 7838    Narrative:      The following orders were created for panel order COVID PRE-OP / PRE-PROCEDURE SCREENING ORDER (NO ISOLATION) - Swab, Nasopharynx.  Procedure                               Abnormality         Status                     ---------                                -----------         ------                     COVID-19,APTIMA PANTHER(...[[id number]]  Normal              Final result                 Please view results for these tests on the individual orders.    COVID-19,APTIMA PANTHER([facility]),[facility], NP/OP SWAB IN UTM/VTM/SALINE TRANSPORT MEDIA,24 HR TAT - Swab, Nasopharynx [[id number]]  (Normal) Collected: 10/06/21 1733    Lab Status: Final result Specimen: Swab from Nasopharynx Updated: 10/06/21 2353     COVID19 Not Detected    Narrative:      Fact sheet for providers: https://www.fda.gov/media/605455/download     Fact sheet for patients: https://www.fda.gov/media/546494/download    Test performed by RT PCR.    COVID PRE-OP / PRE-PROCEDURE SCREENING ORDER (NO ISOLATION) - Swab, Nasopharynx [[id number]]  (Normal) Collected: 10/03/21 0824    Lab Status: Final result Specimen: Swab from Nasopharynx Updated: 10/03/21 0955    Narrative:      The following orders were created for panel order COVID PRE-OP / PRE-PROCEDURE SCREENING ORDER (NO ISOLATION) - Swab, Nasopharynx.  Procedure                               Abnormality         Status                     ---------                               -----------         ------                     COVID-19,[facility] IN-HOUSE...[[id number]]  Normal              Final result                 Please view results for these tests on the individual orders.    COVID-19,[facility] IN-HOUSE CEPHEID/SANDRA NP SWAB IN TRANSPORT MEDIA 8-12 HR TAT - Swab, Nasopharynx [[id number]]  (Normal) Collected: 10/03/21 0824    Lab Status: Final result Specimen: Swab from Nasopharynx Updated: 10/03/21 0955     COVID19 Not Detected    Narrative:      Fact sheet for providers: https://www.fda.gov/media/534779/download    Fact sheet for patients: https://www.fda.gov/media/554448/download    Test performed by PCR.          Assessment:    MRSA pneumonia (HCC)    Metabolic encephalopathy    Oropharyngeal dysphagia    Chronic anticoagulation    HCAP (healthcare-associated  pneumonia)    Seizure disorder (HCC)    Hypernatremia    Elevated troponin    Acute on chronic diastolic heart failure (HCC)    Type 2 diabetes mellitus (HCC)    MRSA bacteremia    Atrial fibrillation (HCC)  1-MRSA bacteremic sepsis likely secondary to healthcare associated pneumonia overall improved with current treatment.  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        Recommendations/Discussions:  · No further work-up for origin/source of and secondary seeding of MRSA bacteremia unless he shows objective evidence of clinical deterioration.    · Given his inability to participate in review of systems and inability to decide which organ system needs to be worked up for secondary seeding or source of MRSA bacteremia and his propensity for future recurrent hospitalizations I would recommend completing treatment of bacteremic MRSA sepsis with 4 weeks of IV vancomycin from last negative blood culture.    · Continue aggressive supportive care and prevent complications of immobility and dysphagia if possible.  · Prognosis is guarded to poor with risk of future recurrent hospitalization      Tricia Bose MD  10/7/2021  07:18 EDT    Much of this encounter note is an electronic transcription/translation of spoken language to printed text. The electronic translation of spoken language may permit erroneous, or at times, nonsensical words or phrases to be inadvertently transcribed; Although I have reviewed the note for such errors, some may still exist

## 2021-10-08 NOTE — PROGRESS NOTES
Case Management Discharge Note      Final Note: Skilled rehab at [facility] term care [facility] via [employer] transport EMS    Provided Post Acute Provider List?: N/A  N/A Provider List Comment: The patient's brother plans for the patient to return to [facility].  Provided Post Acute Provider Quality & Resource List?: N/A  N/A Quality & Resource List Comment: The patient's brother plans for the patient to return to [facility].    Selected Continued Care - Discharged on 10/7/2021 Admission date: 9/19/2021 - Discharge disposition: Skilled Nursing Facility (DC - External)    Destination Coordination complete.    Service Provider Selected Services Address Phone Fax Patient Preferred    [facility]  Skilled Nursing [address] [id number] [phone] --          Durable Medical Equipment    No services have been selected for the patient.              Dialysis/Infusion    No services have been selected for the patient.              Home Medical Care    No services have been selected for the patient.              Therapy    No services have been selected for the patient.              Community Resources    No services have been selected for the patient.              Community & DME    No services have been selected for the patient.                  Transportation Services  Ambulance:  ([employer] EMS run)    Final Discharge Disposition Code: 03 - skilled nursing facility (SNF)

## 2021-10-20 ENCOUNTER — APPOINTMENT (OUTPATIENT)
Dept: CT IMAGING | Facility: HOSPITAL | Age: 61
End: 2021-10-20

## 2021-10-20 ENCOUNTER — HOSPITAL ENCOUNTER (INPATIENT)
Facility: HOSPITAL | Age: 61
LOS: 3 days | Discharge: NURSING, REHAB OR LONG-TERM CARE | End: 2021-10-23
Attending: EMERGENCY MEDICINE | Admitting: STUDENT IN AN ORGANIZED HEALTH CARE EDUCATION/TRAINING PROGRAM

## 2021-10-20 DIAGNOSIS — R79.89 ELEVATED PROCALCITONIN: ICD-10-CM

## 2021-10-20 DIAGNOSIS — E87.20 LACTIC ACID INCREASED: ICD-10-CM

## 2021-10-20 DIAGNOSIS — R29.818 NEUROLOGICAL IMPAIRMENT IN ADULT: ICD-10-CM

## 2021-10-20 DIAGNOSIS — R41.82 ALTERED MENTAL STATUS, UNSPECIFIED ALTERED MENTAL STATUS TYPE: ICD-10-CM

## 2021-10-20 DIAGNOSIS — J69.0 ASPIRATION PNEUMONIA OF BOTH LUNGS, UNSPECIFIED ASPIRATION PNEUMONIA TYPE, UNSPECIFIED PART OF LUNG (HCC): Primary | ICD-10-CM

## 2021-10-20 PROBLEM — I50.32 CHRONIC DIASTOLIC CHF (CONGESTIVE HEART FAILURE) (HCC): Chronic | Status: ACTIVE | Noted: 2021-10-20

## 2021-10-20 PROBLEM — A41.9 SEPSIS (HCC): Status: ACTIVE | Noted: 2021-10-20

## 2021-10-20 LAB
ALBUMIN SERPL-MCNC: 2.9 G/DL (ref 3.5–5.2)
ALBUMIN/GLOB SERPL: 0.7 G/DL
ALP SERPL-CCNC: 70 U/L (ref 39–117)
ALT SERPL W P-5'-P-CCNC: 24 U/L (ref 1–41)
ANION GAP SERPL CALCULATED.3IONS-SCNC: 13 MMOL/L (ref 5–15)
ARTERIAL PATENCY WRIST A: POSITIVE
AST SERPL-CCNC: 33 U/L (ref 1–40)
ATMOSPHERIC PRESS: 753.6 MMHG
BACTERIA UR QL AUTO: ABNORMAL /HPF
BASE EXCESS BLDA CALC-SCNC: 2.5 MMOL/L (ref 0–2)
BDY SITE: ABNORMAL
BILIRUB SERPL-MCNC: 0.2 MG/DL (ref 0–1.2)
BILIRUB UR QL STRIP: NEGATIVE
BUN SERPL-MCNC: 26 MG/DL (ref 8–23)
BUN/CREAT SERPL: 36.1 (ref 7–25)
CALCIUM SPEC-SCNC: 10.2 MG/DL (ref 8.6–10.5)
CHLORIDE SERPL-SCNC: 100 MMOL/L (ref 98–107)
CLARITY UR: CLEAR
CO2 SERPL-SCNC: 26 MMOL/L (ref 22–29)
COLOR UR: YELLOW
CREAT SERPL-MCNC: 0.72 MG/DL (ref 0.76–1.27)
D-LACTATE SERPL-SCNC: 1.5 MMOL/L (ref 0.5–2)
D-LACTATE SERPL-SCNC: 3 MMOL/L (ref 0.5–2)
DEPRECATED RDW RBC AUTO: 48.9 FL (ref 37–54)
ERYTHROCYTE [DISTWIDTH] IN BLOOD BY AUTOMATED COUNT: 16 % (ref 12.3–15.4)
GFR SERPL CREATININE-BSD FRML MDRD: 111 ML/MIN/1.73
GFR SERPL CREATININE-BSD FRML MDRD: 135 ML/MIN/1.73
GLOBULIN UR ELPH-MCNC: 4 GM/DL
GLUCOSE BLDC GLUCOMTR-MCNC: 114 MG/DL (ref 70–130)
GLUCOSE BLDC GLUCOMTR-MCNC: 158 MG/DL (ref 70–130)
GLUCOSE SERPL-MCNC: 145 MG/DL (ref 65–99)
GLUCOSE UR STRIP-MCNC: NEGATIVE MG/DL
HCO3 BLDA-SCNC: 26.3 MMOL/L (ref 22–28)
HCT VFR BLD AUTO: 29.4 % (ref 37.5–51)
HGB BLD-MCNC: 9.8 G/DL (ref 13–17.7)
HGB UR QL STRIP.AUTO: NEGATIVE
HYALINE CASTS UR QL AUTO: ABNORMAL /LPF
INR PPP: 1.08 (ref 0.9–1.1)
KETONES UR QL STRIP: NEGATIVE
LEUKOCYTE ESTERASE UR QL STRIP.AUTO: ABNORMAL
LYMPHOCYTES # BLD MANUAL: 1.2 10*3/MM3 (ref 0.7–3.1)
LYMPHOCYTES NFR BLD MANUAL: 13 % (ref 5–12)
LYMPHOCYTES NFR BLD MANUAL: 7 % (ref 19.6–45.3)
MAGNESIUM SERPL-MCNC: 1.8 MG/DL (ref 1.6–2.4)
MCH RBC QN AUTO: 28.6 PG (ref 26.6–33)
MCHC RBC AUTO-ENTMCNC: 33.3 G/DL (ref 31.5–35.7)
MCV RBC AUTO: 85.7 FL (ref 79–97)
MODALITY: ABNORMAL
MONOCYTES # BLD AUTO: 2.23 10*3/MM3 (ref 0.1–0.9)
NEUTROPHILS # BLD AUTO: 13.75 10*3/MM3 (ref 1.7–7)
NEUTROPHILS NFR BLD MANUAL: 80 % (ref 42.7–76)
NITRITE UR QL STRIP: NEGATIVE
NT-PROBNP SERPL-MCNC: 1775 PG/ML (ref 0–900)
PCO2 BLDA: 37.2 MM HG (ref 35–45)
PH BLDA: 7.46 PH UNITS (ref 7.35–7.45)
PH UR STRIP.AUTO: 7.5 [PH] (ref 5–8)
PHENYTOIN SERPL-MCNC: 5 MCG/ML (ref 10–20)
PLAT MORPH BLD: NORMAL
PLATELET # BLD AUTO: 392 10*3/MM3 (ref 140–450)
PMV BLD AUTO: 10.7 FL (ref 6–12)
PO2 BLDA: 58.7 MM HG (ref 80–100)
POTASSIUM SERPL-SCNC: 4.3 MMOL/L (ref 3.5–5.2)
PROCALCITONIN SERPL-MCNC: 0.38 NG/ML (ref 0–0.25)
PROT SERPL-MCNC: 6.9 G/DL (ref 6–8.5)
PROT UR QL STRIP: ABNORMAL
PROTHROMBIN TIME: 13.8 SECONDS (ref 11.7–14.2)
QT INTERVAL: 315 MS
RBC # BLD AUTO: 3.43 10*6/MM3 (ref 4.14–5.8)
RBC # UR: ABNORMAL /HPF
RBC MORPH BLD: NORMAL
REF LAB TEST METHOD: ABNORMAL
SAO2 % BLDCOA: 91.5 % (ref 92–99)
SARS-COV-2 RNA PNL SPEC NAA+PROBE: NOT DETECTED
SODIUM SERPL-SCNC: 139 MMOL/L (ref 136–145)
SP GR UR STRIP: 1.02 (ref 1–1.03)
SQUAMOUS #/AREA URNS HPF: ABNORMAL /HPF
TOTAL RATE: 20 BREATHS/MINUTE
TROPONIN T SERPL-MCNC: 0.02 NG/ML (ref 0–0.03)
UROBILINOGEN UR QL STRIP: ABNORMAL
WBC # BLD AUTO: 17.19 10*3/MM3 (ref 3.4–10.8)
WBC MORPH BLD: NORMAL
WBC UR QL AUTO: ABNORMAL /HPF
YEAST URNS QL MICRO: ABNORMAL /HPF

## 2021-10-20 PROCEDURE — 84484 ASSAY OF TROPONIN QUANT: CPT | Performed by: EMERGENCY MEDICINE

## 2021-10-20 PROCEDURE — 70450 CT HEAD/BRAIN W/O DYE: CPT

## 2021-10-20 PROCEDURE — 85610 PROTHROMBIN TIME: CPT | Performed by: EMERGENCY MEDICINE

## 2021-10-20 PROCEDURE — P9612 CATHETERIZE FOR URINE SPEC: HCPCS

## 2021-10-20 PROCEDURE — 36600 WITHDRAWAL OF ARTERIAL BLOOD: CPT

## 2021-10-20 PROCEDURE — 85025 COMPLETE CBC W/AUTO DIFF WBC: CPT | Performed by: EMERGENCY MEDICINE

## 2021-10-20 PROCEDURE — 87040 BLOOD CULTURE FOR BACTERIA: CPT | Performed by: EMERGENCY MEDICINE

## 2021-10-20 PROCEDURE — 83605 ASSAY OF LACTIC ACID: CPT | Performed by: EMERGENCY MEDICINE

## 2021-10-20 PROCEDURE — 87635 SARS-COV-2 COVID-19 AMP PRB: CPT | Performed by: EMERGENCY MEDICINE

## 2021-10-20 PROCEDURE — 36415 COLL VENOUS BLD VENIPUNCTURE: CPT | Performed by: EMERGENCY MEDICINE

## 2021-10-20 PROCEDURE — 80185 ASSAY OF PHENYTOIN TOTAL: CPT | Performed by: EMERGENCY MEDICINE

## 2021-10-20 PROCEDURE — 81001 URINALYSIS AUTO W/SCOPE: CPT | Performed by: EMERGENCY MEDICINE

## 2021-10-20 PROCEDURE — 93005 ELECTROCARDIOGRAM TRACING: CPT | Performed by: EMERGENCY MEDICINE

## 2021-10-20 PROCEDURE — 71250 CT THORAX DX C-: CPT

## 2021-10-20 PROCEDURE — 25010000002 PIPERACILLIN SOD-TAZOBACTAM PER 1 G: Performed by: EMERGENCY MEDICINE

## 2021-10-20 PROCEDURE — 99285 EMERGENCY DEPT VISIT HI MDM: CPT

## 2021-10-20 PROCEDURE — 93010 ELECTROCARDIOGRAM REPORT: CPT | Performed by: INTERNAL MEDICINE

## 2021-10-20 PROCEDURE — 85007 BL SMEAR W/DIFF WBC COUNT: CPT | Performed by: EMERGENCY MEDICINE

## 2021-10-20 PROCEDURE — 82962 GLUCOSE BLOOD TEST: CPT

## 2021-10-20 PROCEDURE — 83880 ASSAY OF NATRIURETIC PEPTIDE: CPT | Performed by: EMERGENCY MEDICINE

## 2021-10-20 PROCEDURE — 84145 PROCALCITONIN (PCT): CPT | Performed by: EMERGENCY MEDICINE

## 2021-10-20 PROCEDURE — 82803 BLOOD GASES ANY COMBINATION: CPT

## 2021-10-20 PROCEDURE — 83735 ASSAY OF MAGNESIUM: CPT | Performed by: EMERGENCY MEDICINE

## 2021-10-20 PROCEDURE — 87186 SC STD MICRODIL/AGAR DIL: CPT | Performed by: EMERGENCY MEDICINE

## 2021-10-20 PROCEDURE — 87077 CULTURE AEROBIC IDENTIFY: CPT | Performed by: EMERGENCY MEDICINE

## 2021-10-20 PROCEDURE — 80053 COMPREHEN METABOLIC PANEL: CPT | Performed by: EMERGENCY MEDICINE

## 2021-10-20 PROCEDURE — 87150 DNA/RNA AMPLIFIED PROBE: CPT | Performed by: EMERGENCY MEDICINE

## 2021-10-20 RX ORDER — SACCHAROMYCES BOULARDII 250 MG
250 CAPSULE ORAL 2 TIMES DAILY
Status: DISCONTINUED | OUTPATIENT
Start: 2021-10-20 | End: 2021-10-23 | Stop reason: HOSPADM

## 2021-10-20 RX ORDER — BACLOFEN 10 MG/1
5 TABLET ORAL 2 TIMES DAILY
Status: DISCONTINUED | OUTPATIENT
Start: 2021-10-20 | End: 2021-10-23 | Stop reason: HOSPADM

## 2021-10-20 RX ORDER — MIDODRINE HYDROCHLORIDE 5 MG/1
5 TABLET ORAL
Status: DISCONTINUED | OUTPATIENT
Start: 2021-10-21 | End: 2021-10-23 | Stop reason: HOSPADM

## 2021-10-20 RX ORDER — NICOTINE POLACRILEX 4 MG
15 LOZENGE BUCCAL
Status: DISCONTINUED | OUTPATIENT
Start: 2021-10-20 | End: 2021-10-23 | Stop reason: HOSPADM

## 2021-10-20 RX ORDER — ATORVASTATIN CALCIUM 20 MG/1
40 TABLET, FILM COATED ORAL NIGHTLY
Status: DISCONTINUED | OUTPATIENT
Start: 2021-10-20 | End: 2021-10-23 | Stop reason: HOSPADM

## 2021-10-20 RX ORDER — PHENYTOIN 125 MG/5ML
100 SUSPENSION ORAL EVERY 12 HOURS SCHEDULED
Status: DISCONTINUED | OUTPATIENT
Start: 2021-10-20 | End: 2021-10-23 | Stop reason: HOSPADM

## 2021-10-20 RX ORDER — UREA 10 %
3 LOTION (ML) TOPICAL NIGHTLY PRN
Status: DISCONTINUED | OUTPATIENT
Start: 2021-10-20 | End: 2021-10-23 | Stop reason: HOSPADM

## 2021-10-20 RX ORDER — DEXTROSE MONOHYDRATE 25 G/50ML
25 INJECTION, SOLUTION INTRAVENOUS
Status: DISCONTINUED | OUTPATIENT
Start: 2021-10-20 | End: 2021-10-23 | Stop reason: HOSPADM

## 2021-10-20 RX ORDER — ACETAMINOPHEN 160 MG/5ML
650 SOLUTION ORAL EVERY 4 HOURS PRN
Status: DISCONTINUED | OUTPATIENT
Start: 2021-10-20 | End: 2021-10-23 | Stop reason: HOSPADM

## 2021-10-20 RX ORDER — NITROGLYCERIN 0.4 MG/1
0.4 TABLET SUBLINGUAL
Status: DISCONTINUED | OUTPATIENT
Start: 2021-10-20 | End: 2021-10-23 | Stop reason: HOSPADM

## 2021-10-20 RX ORDER — SODIUM BICARBONATE 650 MG/1
1300 TABLET ORAL 3 TIMES DAILY
Status: DISCONTINUED | OUTPATIENT
Start: 2021-10-20 | End: 2021-10-23 | Stop reason: HOSPADM

## 2021-10-20 RX ORDER — INSULIN LISPRO 100 [IU]/ML
0-9 INJECTION, SOLUTION INTRAVENOUS; SUBCUTANEOUS
Status: DISCONTINUED | OUTPATIENT
Start: 2021-10-20 | End: 2021-10-23 | Stop reason: HOSPADM

## 2021-10-20 RX ORDER — ONDANSETRON 2 MG/ML
4 INJECTION INTRAMUSCULAR; INTRAVENOUS EVERY 6 HOURS PRN
Status: DISCONTINUED | OUTPATIENT
Start: 2021-10-20 | End: 2021-10-23 | Stop reason: HOSPADM

## 2021-10-20 RX ORDER — LEVETIRACETAM 100 MG/ML
500 SOLUTION ORAL EVERY 12 HOURS SCHEDULED
Status: DISCONTINUED | OUTPATIENT
Start: 2021-10-20 | End: 2021-10-23 | Stop reason: HOSPADM

## 2021-10-20 RX ORDER — MIRTAZAPINE 15 MG/1
15 TABLET, FILM COATED ORAL NIGHTLY
Status: DISCONTINUED | OUTPATIENT
Start: 2021-10-20 | End: 2021-10-23 | Stop reason: HOSPADM

## 2021-10-20 RX ORDER — SODIUM CHLORIDE 0.9 % (FLUSH) 0.9 %
10 SYRINGE (ML) INJECTION AS NEEDED
Status: DISCONTINUED | OUTPATIENT
Start: 2021-10-20 | End: 2021-10-23 | Stop reason: HOSPADM

## 2021-10-20 RX ORDER — SODIUM CHLORIDE 9 MG/ML
75 INJECTION, SOLUTION INTRAVENOUS CONTINUOUS
Status: DISCONTINUED | OUTPATIENT
Start: 2021-10-20 | End: 2021-10-23 | Stop reason: HOSPADM

## 2021-10-20 RX ORDER — CHOLECALCIFEROL (VITAMIN D3) 125 MCG
1000 CAPSULE ORAL DAILY
Status: DISCONTINUED | OUTPATIENT
Start: 2021-10-21 | End: 2021-10-23 | Stop reason: HOSPADM

## 2021-10-20 RX ORDER — ONDANSETRON 4 MG/1
4 TABLET, FILM COATED ORAL EVERY 6 HOURS PRN
Status: DISCONTINUED | OUTPATIENT
Start: 2021-10-20 | End: 2021-10-23 | Stop reason: HOSPADM

## 2021-10-20 RX ORDER — FUROSEMIDE 20 MG/1
10 TABLET ORAL DAILY
Status: DISCONTINUED | OUTPATIENT
Start: 2021-10-21 | End: 2021-10-23 | Stop reason: HOSPADM

## 2021-10-20 RX ADMIN — TAZOBACTAM SODIUM AND PIPERACILLIN SODIUM 3.38 G: 375; 3 INJECTION, SOLUTION INTRAVENOUS at 20:31

## 2021-10-21 LAB
ANION GAP SERPL CALCULATED.3IONS-SCNC: 10.9 MMOL/L (ref 5–15)
ARTERIAL PATENCY WRIST A: POSITIVE
ATMOSPHERIC PRESS: 750.1 MMHG
BACTERIA BLD CULT: ABNORMAL
BASE EXCESS BLDA CALC-SCNC: 3.8 MMOL/L (ref 0–2)
BDY SITE: ABNORMAL
BOTTLE TYPE: ABNORMAL
BUN SERPL-MCNC: 24 MG/DL (ref 8–23)
BUN/CREAT SERPL: 32.9 (ref 7–25)
CALCIUM SPEC-SCNC: 10 MG/DL (ref 8.6–10.5)
CHLORIDE SERPL-SCNC: 103 MMOL/L (ref 98–107)
CO2 SERPL-SCNC: 26.1 MMOL/L (ref 22–29)
CREAT SERPL-MCNC: 0.73 MG/DL (ref 0.76–1.27)
DEPRECATED RDW RBC AUTO: 48.8 FL (ref 37–54)
ERYTHROCYTE [DISTWIDTH] IN BLOOD BY AUTOMATED COUNT: 15.9 % (ref 12.3–15.4)
GFR SERPL CREATININE-BSD FRML MDRD: 109 ML/MIN/1.73
GFR SERPL CREATININE-BSD FRML MDRD: 132 ML/MIN/1.73
GLUCOSE BLDC GLUCOMTR-MCNC: 101 MG/DL (ref 70–130)
GLUCOSE BLDC GLUCOMTR-MCNC: 108 MG/DL (ref 70–130)
GLUCOSE BLDC GLUCOMTR-MCNC: 121 MG/DL (ref 70–130)
GLUCOSE BLDC GLUCOMTR-MCNC: 144 MG/DL (ref 70–130)
GLUCOSE SERPL-MCNC: 104 MG/DL (ref 65–99)
HBA1C MFR BLD: 5.05 % (ref 4.8–5.6)
HCO3 BLDA-SCNC: 28.3 MMOL/L (ref 22–28)
HCT VFR BLD AUTO: 27.8 % (ref 37.5–51)
HGB BLD-MCNC: 9 G/DL (ref 13–17.7)
INHALED O2 CONCENTRATION: 21 %
MCH RBC QN AUTO: 27.7 PG (ref 26.6–33)
MCHC RBC AUTO-ENTMCNC: 32.4 G/DL (ref 31.5–35.7)
MCV RBC AUTO: 85.5 FL (ref 79–97)
MODALITY: ABNORMAL
O2 A-A PPRESDIFF RESPIRATORY: 0.5 MMHG
PCO2 BLDA: 41.4 MM HG (ref 35–45)
PH BLDA: 7.44 PH UNITS (ref 7.35–7.45)
PLATELET # BLD AUTO: 339 10*3/MM3 (ref 140–450)
PMV BLD AUTO: 10 FL (ref 6–12)
PO2 BLDA: 57.6 MM HG (ref 80–100)
POTASSIUM SERPL-SCNC: 3.7 MMOL/L (ref 3.5–5.2)
QT INTERVAL: 367 MS
RBC # BLD AUTO: 3.25 10*6/MM3 (ref 4.14–5.8)
SAO2 % BLDCOA: 90.5 % (ref 92–99)
SODIUM SERPL-SCNC: 140 MMOL/L (ref 136–145)
TOTAL RATE: 18 BREATHS/MINUTE
TROPONIN T SERPL-MCNC: 0.05 NG/ML (ref 0–0.03)
TROPONIN T SERPL-MCNC: 0.05 NG/ML (ref 0–0.03)
VANCOMYCIN SERPL-MCNC: 7.2 MCG/ML (ref 5–40)
WBC # BLD AUTO: 10.91 10*3/MM3 (ref 3.4–10.8)

## 2021-10-21 PROCEDURE — 84484 ASSAY OF TROPONIN QUANT: CPT | Performed by: NURSE PRACTITIONER

## 2021-10-21 PROCEDURE — 99221 1ST HOSP IP/OBS SF/LOW 40: CPT | Performed by: INTERNAL MEDICINE

## 2021-10-21 PROCEDURE — 85027 COMPLETE CBC AUTOMATED: CPT

## 2021-10-21 PROCEDURE — 36600 WITHDRAWAL OF ARTERIAL BLOOD: CPT

## 2021-10-21 PROCEDURE — 83036 HEMOGLOBIN GLYCOSYLATED A1C: CPT

## 2021-10-21 PROCEDURE — 93010 ELECTROCARDIOGRAM REPORT: CPT | Performed by: INTERNAL MEDICINE

## 2021-10-21 PROCEDURE — 82803 BLOOD GASES ANY COMBINATION: CPT

## 2021-10-21 PROCEDURE — 80202 ASSAY OF VANCOMYCIN: CPT

## 2021-10-21 PROCEDURE — 87102 FUNGUS ISOLATION CULTURE: CPT | Performed by: STUDENT IN AN ORGANIZED HEALTH CARE EDUCATION/TRAINING PROGRAM

## 2021-10-21 PROCEDURE — 82962 GLUCOSE BLOOD TEST: CPT

## 2021-10-21 PROCEDURE — 80048 BASIC METABOLIC PNL TOTAL CA: CPT

## 2021-10-21 PROCEDURE — 93005 ELECTROCARDIOGRAM TRACING: CPT | Performed by: NURSE PRACTITIONER

## 2021-10-21 PROCEDURE — 25010000002 VANCOMYCIN 750 MG RECONSTITUTED SOLUTION

## 2021-10-21 PROCEDURE — 84484 ASSAY OF TROPONIN QUANT: CPT

## 2021-10-21 PROCEDURE — 25010000002 PIPERACILLIN SOD-TAZOBACTAM PER 1 G

## 2021-10-21 RX ORDER — LANSOPRAZOLE
15 KIT EVERY MORNING
Status: DISCONTINUED | OUTPATIENT
Start: 2021-10-21 | End: 2021-10-23 | Stop reason: HOSPADM

## 2021-10-21 RX ADMIN — SODIUM CHLORIDE 750 MG: 900 INJECTION, SOLUTION INTRAVENOUS at 06:28

## 2021-10-21 RX ADMIN — PHENYTOIN 100 MG: 125 SUSPENSION ORAL at 19:59

## 2021-10-21 RX ADMIN — Medication 1000 MCG: at 09:27

## 2021-10-21 RX ADMIN — PHENYTOIN 100 MG: 125 SUSPENSION ORAL at 09:27

## 2021-10-21 RX ADMIN — TAZOBACTAM SODIUM AND PIPERACILLIN SODIUM 3.38 G: 375; 3 INJECTION, SOLUTION INTRAVENOUS at 05:21

## 2021-10-21 RX ADMIN — SODIUM CHLORIDE 750 MG: 900 INJECTION, SOLUTION INTRAVENOUS at 19:06

## 2021-10-21 RX ADMIN — APIXABAN 5 MG: 5 TABLET, FILM COATED ORAL at 20:00

## 2021-10-21 RX ADMIN — BACLOFEN 5 MG: 10 TABLET ORAL at 00:52

## 2021-10-21 RX ADMIN — PHENYTOIN 100 MG: 125 SUSPENSION ORAL at 00:57

## 2021-10-21 RX ADMIN — MIRTAZAPINE 15 MG: 15 TABLET, FILM COATED ORAL at 19:59

## 2021-10-21 RX ADMIN — MIDODRINE HYDROCHLORIDE 5 MG: 5 TABLET ORAL at 09:27

## 2021-10-21 RX ADMIN — APIXABAN 5 MG: 5 TABLET, FILM COATED ORAL at 09:27

## 2021-10-21 RX ADMIN — LEVETIRACETAM 500 MG: 100 SOLUTION ORAL at 09:27

## 2021-10-21 RX ADMIN — LEVETIRACETAM 500 MG: 100 SOLUTION ORAL at 00:54

## 2021-10-21 RX ADMIN — SODIUM BICARBONATE 1300 MG: 650 TABLET ORAL at 09:27

## 2021-10-21 RX ADMIN — Medication 250 MG: at 20:00

## 2021-10-21 RX ADMIN — BACLOFEN 5 MG: 10 TABLET ORAL at 19:59

## 2021-10-21 RX ADMIN — TAZOBACTAM SODIUM AND PIPERACILLIN SODIUM 3.38 G: 375; 3 INJECTION, SOLUTION INTRAVENOUS at 19:59

## 2021-10-21 RX ADMIN — APIXABAN 5 MG: 5 TABLET, FILM COATED ORAL at 00:53

## 2021-10-21 RX ADMIN — METOPROLOL TARTRATE 12.5 MG: 25 TABLET, FILM COATED ORAL at 00:51

## 2021-10-21 RX ADMIN — SODIUM CHLORIDE, PRESERVATIVE FREE 10 ML: 5 INJECTION INTRAVENOUS at 20:01

## 2021-10-21 RX ADMIN — METOPROLOL TARTRATE 12.5 MG: 25 TABLET, FILM COATED ORAL at 09:27

## 2021-10-21 RX ADMIN — Medication 250 MG: at 00:56

## 2021-10-21 RX ADMIN — BACLOFEN 5 MG: 10 TABLET ORAL at 09:27

## 2021-10-21 RX ADMIN — MIRTAZAPINE 15 MG: 15 TABLET, FILM COATED ORAL at 00:53

## 2021-10-21 RX ADMIN — TAZOBACTAM SODIUM AND PIPERACILLIN SODIUM 3.38 G: 375; 3 INJECTION, SOLUTION INTRAVENOUS at 12:49

## 2021-10-21 RX ADMIN — ATORVASTATIN CALCIUM 40 MG: 20 TABLET, FILM COATED ORAL at 00:52

## 2021-10-21 RX ADMIN — Medication 250 MG: at 09:27

## 2021-10-21 RX ADMIN — METOPROLOL TARTRATE 12.5 MG: 25 TABLET, FILM COATED ORAL at 19:59

## 2021-10-21 RX ADMIN — SODIUM BICARBONATE 1300 MG: 650 TABLET ORAL at 20:00

## 2021-10-21 RX ADMIN — SODIUM BICARBONATE 1300 MG: 650 TABLET ORAL at 17:55

## 2021-10-21 RX ADMIN — MIDODRINE HYDROCHLORIDE 5 MG: 5 TABLET ORAL at 12:49

## 2021-10-21 RX ADMIN — MIDODRINE HYDROCHLORIDE 5 MG: 5 TABLET ORAL at 17:56

## 2021-10-21 RX ADMIN — ATORVASTATIN CALCIUM 40 MG: 20 TABLET, FILM COATED ORAL at 20:00

## 2021-10-21 RX ADMIN — LANSOPRAZOLE 15 MG: KIT at 09:25

## 2021-10-21 RX ADMIN — SODIUM BICARBONATE 1300 MG: 650 TABLET ORAL at 00:53

## 2021-10-21 RX ADMIN — MAGNESIUM OXIDE 400 MG (241.3 MG MAGNESIUM) TABLET 400 MG: TABLET at 09:27

## 2021-10-21 RX ADMIN — FUROSEMIDE 10 MG: 20 TABLET ORAL at 09:27

## 2021-10-21 RX ADMIN — LEVETIRACETAM 500 MG: 100 SOLUTION ORAL at 20:00

## 2021-10-21 RX ADMIN — SODIUM CHLORIDE 75 ML/HR: 9 INJECTION, SOLUTION INTRAVENOUS at 00:51

## 2021-10-22 LAB
ANION GAP SERPL CALCULATED.3IONS-SCNC: 6.3 MMOL/L (ref 5–15)
BASOPHILS # BLD AUTO: 0.07 10*3/MM3 (ref 0–0.2)
BASOPHILS NFR BLD AUTO: 1 % (ref 0–1.5)
BUN SERPL-MCNC: 19 MG/DL (ref 8–23)
BUN/CREAT SERPL: 31.7 (ref 7–25)
CALCIUM SPEC-SCNC: 9.5 MG/DL (ref 8.6–10.5)
CHLORIDE SERPL-SCNC: 107 MMOL/L (ref 98–107)
CO2 SERPL-SCNC: 28.7 MMOL/L (ref 22–29)
CREAT SERPL-MCNC: 0.6 MG/DL (ref 0.76–1.27)
DEPRECATED RDW RBC AUTO: 50.4 FL (ref 37–54)
EOSINOPHIL # BLD AUTO: 0.2 10*3/MM3 (ref 0–0.4)
EOSINOPHIL NFR BLD AUTO: 2.9 % (ref 0.3–6.2)
ERYTHROCYTE [DISTWIDTH] IN BLOOD BY AUTOMATED COUNT: 15.6 % (ref 12.3–15.4)
GFR SERPL CREATININE-BSD FRML MDRD: 137 ML/MIN/1.73
GFR SERPL CREATININE-BSD FRML MDRD: >150 ML/MIN/1.73
GLUCOSE BLDC GLUCOMTR-MCNC: 103 MG/DL (ref 70–130)
GLUCOSE BLDC GLUCOMTR-MCNC: 107 MG/DL (ref 70–130)
GLUCOSE BLDC GLUCOMTR-MCNC: 112 MG/DL (ref 70–130)
GLUCOSE BLDC GLUCOMTR-MCNC: 117 MG/DL (ref 70–130)
GLUCOSE SERPL-MCNC: 116 MG/DL (ref 65–99)
HCT VFR BLD AUTO: 27.4 % (ref 37.5–51)
HGB BLD-MCNC: 8.3 G/DL (ref 13–17.7)
IMM GRANULOCYTES # BLD AUTO: 0.04 10*3/MM3 (ref 0–0.05)
IMM GRANULOCYTES NFR BLD AUTO: 0.6 % (ref 0–0.5)
LYMPHOCYTES # BLD AUTO: 1.8 10*3/MM3 (ref 0.7–3.1)
LYMPHOCYTES NFR BLD AUTO: 26.5 % (ref 19.6–45.3)
MAGNESIUM SERPL-MCNC: 1.9 MG/DL (ref 1.6–2.4)
MCH RBC QN AUTO: 26.9 PG (ref 26.6–33)
MCHC RBC AUTO-ENTMCNC: 30.3 G/DL (ref 31.5–35.7)
MCV RBC AUTO: 89 FL (ref 79–97)
MONOCYTES # BLD AUTO: 0.91 10*3/MM3 (ref 0.1–0.9)
MONOCYTES NFR BLD AUTO: 13.4 % (ref 5–12)
NEUTROPHILS NFR BLD AUTO: 3.78 10*3/MM3 (ref 1.7–7)
NEUTROPHILS NFR BLD AUTO: 55.6 % (ref 42.7–76)
NRBC BLD AUTO-RTO: 0 /100 WBC (ref 0–0.2)
PHOSPHATE SERPL-MCNC: 3.1 MG/DL (ref 2.5–4.5)
PLATELET # BLD AUTO: 322 10*3/MM3 (ref 140–450)
PMV BLD AUTO: 10.1 FL (ref 6–12)
POTASSIUM SERPL-SCNC: 3.4 MMOL/L (ref 3.5–5.2)
RBC # BLD AUTO: 3.08 10*6/MM3 (ref 4.14–5.8)
SODIUM SERPL-SCNC: 142 MMOL/L (ref 136–145)
WBC # BLD AUTO: 6.8 10*3/MM3 (ref 3.4–10.8)

## 2021-10-22 PROCEDURE — 25010000002 PIPERACILLIN SOD-TAZOBACTAM PER 1 G

## 2021-10-22 PROCEDURE — 83735 ASSAY OF MAGNESIUM: CPT | Performed by: STUDENT IN AN ORGANIZED HEALTH CARE EDUCATION/TRAINING PROGRAM

## 2021-10-22 PROCEDURE — 85025 COMPLETE CBC W/AUTO DIFF WBC: CPT | Performed by: STUDENT IN AN ORGANIZED HEALTH CARE EDUCATION/TRAINING PROGRAM

## 2021-10-22 PROCEDURE — 82962 GLUCOSE BLOOD TEST: CPT

## 2021-10-22 PROCEDURE — 84100 ASSAY OF PHOSPHORUS: CPT | Performed by: STUDENT IN AN ORGANIZED HEALTH CARE EDUCATION/TRAINING PROGRAM

## 2021-10-22 PROCEDURE — 80048 BASIC METABOLIC PNL TOTAL CA: CPT | Performed by: STUDENT IN AN ORGANIZED HEALTH CARE EDUCATION/TRAINING PROGRAM

## 2021-10-22 RX ADMIN — BACLOFEN 5 MG: 10 TABLET ORAL at 08:00

## 2021-10-22 RX ADMIN — TAZOBACTAM SODIUM AND PIPERACILLIN SODIUM 3.38 G: 375; 3 INJECTION, SOLUTION INTRAVENOUS at 12:49

## 2021-10-22 RX ADMIN — BACLOFEN 5 MG: 10 TABLET ORAL at 20:10

## 2021-10-22 RX ADMIN — SODIUM BICARBONATE 1300 MG: 650 TABLET ORAL at 17:23

## 2021-10-22 RX ADMIN — Medication 1000 MCG: at 08:00

## 2021-10-22 RX ADMIN — SODIUM CHLORIDE, PRESERVATIVE FREE 10 ML: 5 INJECTION INTRAVENOUS at 08:01

## 2021-10-22 RX ADMIN — SODIUM CHLORIDE, PRESERVATIVE FREE 10 ML: 5 INJECTION INTRAVENOUS at 08:03

## 2021-10-22 RX ADMIN — SODIUM BICARBONATE 1300 MG: 650 TABLET ORAL at 20:10

## 2021-10-22 RX ADMIN — MIDODRINE HYDROCHLORIDE 5 MG: 5 TABLET ORAL at 12:48

## 2021-10-22 RX ADMIN — MIDODRINE HYDROCHLORIDE 5 MG: 5 TABLET ORAL at 08:00

## 2021-10-22 RX ADMIN — APIXABAN 5 MG: 5 TABLET, FILM COATED ORAL at 08:00

## 2021-10-22 RX ADMIN — LEVETIRACETAM 500 MG: 100 SOLUTION ORAL at 08:00

## 2021-10-22 RX ADMIN — ATORVASTATIN CALCIUM 40 MG: 20 TABLET, FILM COATED ORAL at 20:10

## 2021-10-22 RX ADMIN — LANSOPRAZOLE 15 MG: KIT at 06:36

## 2021-10-22 RX ADMIN — SODIUM BICARBONATE 1300 MG: 650 TABLET ORAL at 08:00

## 2021-10-22 RX ADMIN — LEVETIRACETAM 500 MG: 100 SOLUTION ORAL at 20:09

## 2021-10-22 RX ADMIN — PHENYTOIN 100 MG: 125 SUSPENSION ORAL at 08:00

## 2021-10-22 RX ADMIN — SODIUM CHLORIDE 75 ML/HR: 9 INJECTION, SOLUTION INTRAVENOUS at 04:03

## 2021-10-22 RX ADMIN — Medication 250 MG: at 20:09

## 2021-10-22 RX ADMIN — FUROSEMIDE 10 MG: 20 TABLET ORAL at 08:00

## 2021-10-22 RX ADMIN — MIRTAZAPINE 15 MG: 15 TABLET, FILM COATED ORAL at 20:10

## 2021-10-22 RX ADMIN — Medication 250 MG: at 08:00

## 2021-10-22 RX ADMIN — MIDODRINE HYDROCHLORIDE 5 MG: 5 TABLET ORAL at 17:23

## 2021-10-22 RX ADMIN — SODIUM CHLORIDE 75 ML/HR: 9 INJECTION, SOLUTION INTRAVENOUS at 17:23

## 2021-10-22 RX ADMIN — TAZOBACTAM SODIUM AND PIPERACILLIN SODIUM 3.38 G: 375; 3 INJECTION, SOLUTION INTRAVENOUS at 20:11

## 2021-10-22 RX ADMIN — PHENYTOIN 100 MG: 125 SUSPENSION ORAL at 20:09

## 2021-10-22 RX ADMIN — MAGNESIUM OXIDE 400 MG (241.3 MG MAGNESIUM) TABLET 400 MG: TABLET at 08:00

## 2021-10-22 RX ADMIN — SODIUM CHLORIDE, PRESERVATIVE FREE 20 ML: 5 INJECTION INTRAVENOUS at 13:06

## 2021-10-22 RX ADMIN — TAZOBACTAM SODIUM AND PIPERACILLIN SODIUM 3.38 G: 375; 3 INJECTION, SOLUTION INTRAVENOUS at 04:03

## 2021-10-22 RX ADMIN — APIXABAN 5 MG: 5 TABLET, FILM COATED ORAL at 20:10

## 2021-10-22 RX ADMIN — METOPROLOL TARTRATE 12.5 MG: 25 TABLET, FILM COATED ORAL at 08:00

## 2021-10-22 RX ADMIN — METOPROLOL TARTRATE 12.5 MG: 25 TABLET, FILM COATED ORAL at 20:09

## 2021-10-23 VITALS
WEIGHT: 128.9 LBS | TEMPERATURE: 96.2 F | DIASTOLIC BLOOD PRESSURE: 84 MMHG | OXYGEN SATURATION: 97 % | HEART RATE: 67 BPM | HEIGHT: 64 IN | BODY MASS INDEX: 22.01 KG/M2 | RESPIRATION RATE: 18 BRPM | SYSTOLIC BLOOD PRESSURE: 133 MMHG

## 2021-10-23 LAB
ANION GAP SERPL CALCULATED.3IONS-SCNC: 8.8 MMOL/L (ref 5–15)
BACTERIA SPEC AEROBE CULT: ABNORMAL
BASOPHILS # BLD AUTO: 0.07 10*3/MM3 (ref 0–0.2)
BASOPHILS NFR BLD AUTO: 0.9 % (ref 0–1.5)
BUN SERPL-MCNC: 20 MG/DL (ref 8–23)
BUN/CREAT SERPL: 40.8 (ref 7–25)
CALCIUM SPEC-SCNC: 9 MG/DL (ref 8.6–10.5)
CHLORIDE SERPL-SCNC: 107 MMOL/L (ref 98–107)
CO2 SERPL-SCNC: 27.2 MMOL/L (ref 22–29)
CREAT SERPL-MCNC: 0.49 MG/DL (ref 0.76–1.27)
DEPRECATED RDW RBC AUTO: 50.2 FL (ref 37–54)
EOSINOPHIL # BLD AUTO: 0.24 10*3/MM3 (ref 0–0.4)
EOSINOPHIL NFR BLD AUTO: 3 % (ref 0.3–6.2)
ERYTHROCYTE [DISTWIDTH] IN BLOOD BY AUTOMATED COUNT: 15.6 % (ref 12.3–15.4)
GFR SERPL CREATININE-BSD FRML MDRD: >150 ML/MIN/1.73
GFR SERPL CREATININE-BSD FRML MDRD: >150 ML/MIN/1.73
GLUCOSE BLDC GLUCOMTR-MCNC: 115 MG/DL (ref 70–130)
GLUCOSE BLDC GLUCOMTR-MCNC: 123 MG/DL (ref 70–130)
GLUCOSE SERPL-MCNC: 106 MG/DL (ref 65–99)
GRAM STN SPEC: ABNORMAL
HCT VFR BLD AUTO: 26.1 % (ref 37.5–51)
HGB BLD-MCNC: 8.1 G/DL (ref 13–17.7)
IMM GRANULOCYTES # BLD AUTO: 0.04 10*3/MM3 (ref 0–0.05)
IMM GRANULOCYTES NFR BLD AUTO: 0.5 % (ref 0–0.5)
ISOLATED FROM: ABNORMAL
LYMPHOCYTES # BLD AUTO: 2.03 10*3/MM3 (ref 0.7–3.1)
LYMPHOCYTES NFR BLD AUTO: 25.3 % (ref 19.6–45.3)
MAGNESIUM SERPL-MCNC: 1.9 MG/DL (ref 1.6–2.4)
MCH RBC QN AUTO: 27.5 PG (ref 26.6–33)
MCHC RBC AUTO-ENTMCNC: 31 G/DL (ref 31.5–35.7)
MCV RBC AUTO: 88.5 FL (ref 79–97)
MONOCYTES # BLD AUTO: 0.89 10*3/MM3 (ref 0.1–0.9)
MONOCYTES NFR BLD AUTO: 11.1 % (ref 5–12)
NEUTROPHILS NFR BLD AUTO: 4.75 10*3/MM3 (ref 1.7–7)
NEUTROPHILS NFR BLD AUTO: 59.2 % (ref 42.7–76)
NRBC BLD AUTO-RTO: 0 /100 WBC (ref 0–0.2)
PHOSPHATE SERPL-MCNC: 2.4 MG/DL (ref 2.5–4.5)
PLATELET # BLD AUTO: 334 10*3/MM3 (ref 140–450)
PMV BLD AUTO: 10.1 FL (ref 6–12)
POTASSIUM SERPL-SCNC: 3.4 MMOL/L (ref 3.5–5.2)
RBC # BLD AUTO: 2.95 10*6/MM3 (ref 4.14–5.8)
SODIUM SERPL-SCNC: 143 MMOL/L (ref 136–145)
WBC # BLD AUTO: 8.02 10*3/MM3 (ref 3.4–10.8)

## 2021-10-23 PROCEDURE — 82962 GLUCOSE BLOOD TEST: CPT

## 2021-10-23 PROCEDURE — 84100 ASSAY OF PHOSPHORUS: CPT | Performed by: STUDENT IN AN ORGANIZED HEALTH CARE EDUCATION/TRAINING PROGRAM

## 2021-10-23 PROCEDURE — 85025 COMPLETE CBC W/AUTO DIFF WBC: CPT | Performed by: STUDENT IN AN ORGANIZED HEALTH CARE EDUCATION/TRAINING PROGRAM

## 2021-10-23 PROCEDURE — 25010000002 PIPERACILLIN SOD-TAZOBACTAM PER 1 G

## 2021-10-23 PROCEDURE — 80048 BASIC METABOLIC PNL TOTAL CA: CPT | Performed by: STUDENT IN AN ORGANIZED HEALTH CARE EDUCATION/TRAINING PROGRAM

## 2021-10-23 PROCEDURE — 83735 ASSAY OF MAGNESIUM: CPT | Performed by: STUDENT IN AN ORGANIZED HEALTH CARE EDUCATION/TRAINING PROGRAM

## 2021-10-23 RX ORDER — POTASSIUM CHLORIDE 1.5 G/1.77G
40 POWDER, FOR SOLUTION ORAL AS NEEDED
Status: DISCONTINUED | OUTPATIENT
Start: 2021-10-23 | End: 2021-10-23 | Stop reason: HOSPADM

## 2021-10-23 RX ORDER — AMOXICILLIN AND CLAVULANATE POTASSIUM 875; 125 MG/1; MG/1
1 TABLET, FILM COATED ORAL EVERY 12 HOURS SCHEDULED
Status: DISCONTINUED | OUTPATIENT
Start: 2021-10-23 | End: 2021-10-23 | Stop reason: HOSPADM

## 2021-10-23 RX ORDER — AMOXICILLIN AND CLAVULANATE POTASSIUM 875; 125 MG/1; MG/1
1 TABLET, FILM COATED ORAL 2 TIMES DAILY
Qty: 6 TABLET | Refills: 0 | Status: SHIPPED | OUTPATIENT
Start: 2021-10-23 | End: 2021-10-26

## 2021-10-23 RX ADMIN — SODIUM BICARBONATE 1300 MG: 650 TABLET ORAL at 09:49

## 2021-10-23 RX ADMIN — TAZOBACTAM SODIUM AND PIPERACILLIN SODIUM 3.38 G: 375; 3 INJECTION, SOLUTION INTRAVENOUS at 03:07

## 2021-10-23 RX ADMIN — AMOXICILLIN AND CLAVULANATE POTASSIUM 1 TABLET: 875; 125 TABLET, FILM COATED ORAL at 12:13

## 2021-10-23 RX ADMIN — Medication 1000 MCG: at 09:49

## 2021-10-23 RX ADMIN — POTASSIUM CHLORIDE 40 MEQ: 1.5 POWDER, FOR SOLUTION ORAL at 12:13

## 2021-10-23 RX ADMIN — PHENYTOIN 100 MG: 125 SUSPENSION ORAL at 09:48

## 2021-10-23 RX ADMIN — LEVETIRACETAM 500 MG: 100 SOLUTION ORAL at 09:48

## 2021-10-23 RX ADMIN — MIDODRINE HYDROCHLORIDE 5 MG: 5 TABLET ORAL at 12:13

## 2021-10-23 RX ADMIN — FUROSEMIDE 10 MG: 20 TABLET ORAL at 09:49

## 2021-10-23 RX ADMIN — Medication 250 MG: at 09:48

## 2021-10-23 RX ADMIN — BACLOFEN 5 MG: 10 TABLET ORAL at 09:49

## 2021-10-23 RX ADMIN — MAGNESIUM OXIDE 400 MG (241.3 MG MAGNESIUM) TABLET 400 MG: TABLET at 09:49

## 2021-10-23 RX ADMIN — METOPROLOL TARTRATE 12.5 MG: 25 TABLET, FILM COATED ORAL at 09:49

## 2021-10-23 RX ADMIN — APIXABAN 5 MG: 5 TABLET, FILM COATED ORAL at 09:49

## 2021-10-23 RX ADMIN — LANSOPRAZOLE 15 MG: KIT at 05:16

## 2021-10-23 RX ADMIN — SODIUM CHLORIDE 75 ML/HR: 9 INJECTION, SOLUTION INTRAVENOUS at 07:22

## 2021-10-23 RX ADMIN — MIDODRINE HYDROCHLORIDE 5 MG: 5 TABLET ORAL at 09:49

## 2021-10-25 LAB — BACTERIA SPEC AEROBE CULT: NORMAL

## 2021-10-26 LAB — BACTERIA ISLT: NORMAL

## (undated) DEVICE — BITEBLOCK OMNI BLOC

## (undated) DEVICE — NDL HYPO ECLPS SFTY 25G 1 1/2IN

## (undated) DEVICE — KT ORCA ORCAPOD DISP STRL

## (undated) DEVICE — SYR LUERLOK 5CC

## (undated) DEVICE — ADAPT CLN BIOGUARD AIR/H2O DISP

## (undated) DEVICE — TUBING, SUCTION, 1/4" X 10', STRAIGHT: Brand: MEDLINE

## (undated) DEVICE — CANN O2 ETCO2 FITS ALL CONN CO2 SMPL A/ 7IN DISP LF

## (undated) DEVICE — TBG FEED PULL FLOW20 NO/DRUG 20F 4.47MM 150CM

## (undated) DEVICE — BNDR ABD 4PANEL 12IN MED/LG

## (undated) DEVICE — SENSR O2 OXIMAX FNGR A/ 18IN NONSTR